# Patient Record
Sex: FEMALE | URBAN - METROPOLITAN AREA
[De-identification: names, ages, dates, MRNs, and addresses within clinical notes are randomized per-mention and may not be internally consistent; named-entity substitution may affect disease eponyms.]

---

## 2023-08-30 ENCOUNTER — HOSPITAL ENCOUNTER (EMERGENCY)
Facility: HOSPITAL | Age: 23
Discharge: HOME | End: 2023-08-31
Attending: EMERGENCY MEDICINE
Payer: COMMERCIAL

## 2023-08-30 DIAGNOSIS — F29 PSYCHOSIS (HCC): Primary | ICD-10-CM

## 2023-08-30 DIAGNOSIS — Z00.8 ENCOUNTER FOR PSYCHOLOGICAL EVALUATION: ICD-10-CM

## 2023-08-30 DIAGNOSIS — F10.929 ALCOHOL INTOXICATION (HCC): ICD-10-CM

## 2023-08-30 LAB
ALBUMIN SERPL BCP-MCNC: 4.6 G/DL (ref 3.5–5)
ALP SERPL-CCNC: 54 U/L (ref 34–104)
ALT SERPL W P-5'-P-CCNC: 9 U/L (ref 7–52)
AMPHETAMINES SERPL QL SCN: NEGATIVE
ANION GAP SERPL CALCULATED.3IONS-SCNC: 15 MMOL/L
APAP SERPL-MCNC: <10 UG/ML (ref 10–20)
AST SERPL W P-5'-P-CCNC: 17 U/L (ref 13–39)
B-HCG SERPL-ACNC: <1 MIU/ML (ref 0–5)
BARBITURATES UR QL: NEGATIVE
BASOPHILS # BLD AUTO: 0.02 THOUSANDS/ÂΜL (ref 0–0.1)
BASOPHILS NFR BLD AUTO: 0 % (ref 0–1)
BENZODIAZ UR QL: NEGATIVE
BILIRUB SERPL-MCNC: 0.3 MG/DL (ref 0.2–1)
BILIRUB UR QL STRIP: NEGATIVE
BUN SERPL-MCNC: 9 MG/DL (ref 5–25)
CALCIUM SERPL-MCNC: 9.6 MG/DL (ref 8.4–10.2)
CHLORIDE SERPL-SCNC: 103 MMOL/L (ref 96–108)
CLARITY UR: CLEAR
CO2 SERPL-SCNC: 19 MMOL/L (ref 21–32)
COCAINE UR QL: NEGATIVE
COLOR UR: NORMAL
CREAT SERPL-MCNC: 0.86 MG/DL (ref 0.6–1.3)
EOSINOPHIL # BLD AUTO: 0.07 THOUSAND/ÂΜL (ref 0–0.61)
EOSINOPHIL NFR BLD AUTO: 1 % (ref 0–6)
ERYTHROCYTE [DISTWIDTH] IN BLOOD BY AUTOMATED COUNT: 12.7 % (ref 11.6–15.1)
ETHANOL SERPL-MCNC: 47 MG/DL
EXT PREGNANCY TEST URINE: NEGATIVE
EXT. CONTROL: NORMAL
GFR SERPL CREATININE-BSD FRML MDRD: 96 ML/MIN/1.73SQ M
GLUCOSE SERPL-MCNC: 84 MG/DL (ref 65–140)
GLUCOSE UR STRIP-MCNC: NEGATIVE MG/DL
HCT VFR BLD AUTO: 36 % (ref 34.8–46.1)
HGB BLD-MCNC: 11.3 G/DL (ref 11.5–15.4)
HGB UR QL STRIP.AUTO: NEGATIVE
IMM GRANULOCYTES # BLD AUTO: 0.01 THOUSAND/UL (ref 0–0.2)
IMM GRANULOCYTES NFR BLD AUTO: 0 % (ref 0–2)
KETONES UR STRIP-MCNC: NEGATIVE MG/DL
LEUKOCYTE ESTERASE UR QL STRIP: NEGATIVE
LYMPHOCYTES # BLD AUTO: 1.94 THOUSANDS/ÂΜL (ref 0.6–4.47)
LYMPHOCYTES NFR BLD AUTO: 35 % (ref 14–44)
MAGNESIUM SERPL-MCNC: 2 MG/DL (ref 1.9–2.7)
MCH RBC QN AUTO: 29.3 PG (ref 26.8–34.3)
MCHC RBC AUTO-ENTMCNC: 31.4 G/DL (ref 31.4–37.4)
MCV RBC AUTO: 93 FL (ref 82–98)
METHADONE UR QL: NEGATIVE
MONOCYTES # BLD AUTO: 0.48 THOUSAND/ÂΜL (ref 0.17–1.22)
MONOCYTES NFR BLD AUTO: 9 % (ref 4–12)
NEUTROPHILS # BLD AUTO: 3.05 THOUSANDS/ÂΜL (ref 1.85–7.62)
NEUTS SEG NFR BLD AUTO: 55 % (ref 43–75)
NITRITE UR QL STRIP: NEGATIVE
NRBC BLD AUTO-RTO: 0 /100 WBCS
OPIATES UR QL SCN: NEGATIVE
OXYCODONE+OXYMORPHONE UR QL SCN: NEGATIVE
PCP UR QL: NEGATIVE
PH UR STRIP.AUTO: 6.5 [PH]
PLATELET # BLD AUTO: 292 THOUSANDS/UL (ref 149–390)
PMV BLD AUTO: 9.7 FL (ref 8.9–12.7)
POTASSIUM SERPL-SCNC: 3.6 MMOL/L (ref 3.5–5.3)
PROT SERPL-MCNC: 7.2 G/DL (ref 6.4–8.4)
PROT UR STRIP-MCNC: NEGATIVE MG/DL
RBC # BLD AUTO: 3.86 MILLION/UL (ref 3.81–5.12)
SALICYLATES SERPL-MCNC: <5 MG/DL (ref 3–20)
SODIUM SERPL-SCNC: 137 MMOL/L (ref 135–147)
SP GR UR STRIP.AUTO: <=1.005 (ref 1–1.03)
THC UR QL: NEGATIVE
UROBILINOGEN UR QL STRIP.AUTO: 0.2 E.U./DL
WBC # BLD AUTO: 5.57 THOUSAND/UL (ref 4.31–10.16)

## 2023-08-30 PROCEDURE — 82077 ASSAY SPEC XCP UR&BREATH IA: CPT | Performed by: EMERGENCY MEDICINE

## 2023-08-30 PROCEDURE — 36415 COLL VENOUS BLD VENIPUNCTURE: CPT | Performed by: EMERGENCY MEDICINE

## 2023-08-30 PROCEDURE — 80307 DRUG TEST PRSMV CHEM ANLYZR: CPT | Performed by: EMERGENCY MEDICINE

## 2023-08-30 PROCEDURE — 85025 COMPLETE CBC W/AUTO DIFF WBC: CPT | Performed by: EMERGENCY MEDICINE

## 2023-08-30 PROCEDURE — 80179 DRUG ASSAY SALICYLATE: CPT | Performed by: EMERGENCY MEDICINE

## 2023-08-30 PROCEDURE — 99282 EMERGENCY DEPT VISIT SF MDM: CPT

## 2023-08-30 PROCEDURE — 80053 COMPREHEN METABOLIC PANEL: CPT | Performed by: EMERGENCY MEDICINE

## 2023-08-30 PROCEDURE — 81025 URINE PREGNANCY TEST: CPT | Performed by: EMERGENCY MEDICINE

## 2023-08-30 PROCEDURE — 81003 URINALYSIS AUTO W/O SCOPE: CPT | Performed by: EMERGENCY MEDICINE

## 2023-08-30 PROCEDURE — 84702 CHORIONIC GONADOTROPIN TEST: CPT | Performed by: EMERGENCY MEDICINE

## 2023-08-30 PROCEDURE — 80143 DRUG ASSAY ACETAMINOPHEN: CPT | Performed by: EMERGENCY MEDICINE

## 2023-08-30 PROCEDURE — 96372 THER/PROPH/DIAG INJ SC/IM: CPT

## 2023-08-30 PROCEDURE — 83735 ASSAY OF MAGNESIUM: CPT | Performed by: EMERGENCY MEDICINE

## 2023-08-30 RX ORDER — LORAZEPAM 2 MG/ML
2 INJECTION INTRAMUSCULAR ONCE
Status: DISCONTINUED | OUTPATIENT
Start: 2023-08-30 | End: 2023-08-30

## 2023-08-30 RX ORDER — LORAZEPAM 2 MG/ML
2 INJECTION INTRAMUSCULAR ONCE
Status: COMPLETED | OUTPATIENT
Start: 2023-08-30 | End: 2023-08-30

## 2023-08-30 RX ORDER — OLANZAPINE 10 MG/1
10 INJECTION, POWDER, LYOPHILIZED, FOR SOLUTION INTRAMUSCULAR ONCE
Status: COMPLETED | OUTPATIENT
Start: 2023-08-30 | End: 2023-08-30

## 2023-08-30 RX ADMIN — LORAZEPAM 2 MG: 2 INJECTION INTRAMUSCULAR; INTRAVENOUS at 21:44

## 2023-08-30 RX ADMIN — OLANZAPINE 10 MG: 10 INJECTION, POWDER, FOR SOLUTION INTRAMUSCULAR at 21:44

## 2023-08-31 VITALS
RESPIRATION RATE: 18 BRPM | DIASTOLIC BLOOD PRESSURE: 55 MMHG | OXYGEN SATURATION: 97 % | WEIGHT: 143.96 LBS | HEART RATE: 72 BPM | TEMPERATURE: 99.1 F | SYSTOLIC BLOOD PRESSURE: 90 MMHG

## 2023-08-31 RX ORDER — AMIODARONE HYDROCHLORIDE 50 MG/ML
INJECTION, SOLUTION INTRAVENOUS
Status: DISCONTINUED
Start: 2023-08-31 | End: 2023-08-31 | Stop reason: WASHOUT

## 2023-08-31 NOTE — ED NOTES
15:35 - PES got verbal consent from patient to speak with BF Johnny April [phone]). 16:55 - d/c to BF in lobby - given referral phone #'s for [facility] intake and [facility] and [employer].

## 2023-08-31 NOTE — DISCHARGE INSTRUCTIONS
If you have any thoughts of hurting yourself or hurting anyone else or do not feel safe at home, return to the emergency department.

## 2023-08-31 NOTE — ED CARE HANDOFF
Emergency Department Sign Out Note        Sign out and transfer of care from Dr. Blake Montano. See Separate Emergency Department note. The patient, Sue Sanz, was evaluated by the previous provider for agitation, intoxication. Workup Completed:  Patient previously medically cleared    ED Course / Workup Pending (followup): Patient signed out pending observation period and reassessment by crisis worker. On both my evaluation and of duration of ED crisis worker patient awake, alert, calm, cooperative. Patient stating that she only made recalls the events from last night however acknowledges that she has had a similar reaction to alcohol in the past.  Patient denies any additional medical or psychiatric complaint at this time, feels safe being discharged home. Procedures  MDM        Disposition  Final diagnoses:   Psychosis ([address])   Encounter for psychological evaluation     Time reflects when diagnosis was documented in both MDM as applicable and the Disposition within this note     Time User Action Codes Description Comment    8/31/2023 12:38 AM Wilfred Flores [F29] Psychosis ([address])     8/31/2023 12:38 AM Wilfred Flores [Z00.8] Encounter for psychological evaluation       ED Disposition     ED Disposition   Transfer to [address] [place] [address]   --    Date/Time   Thu Aug 31, 2023 12:38 AM    Comment   Sue Sanz should be transferred out to [facility] and has been medically cleared. Follow-up Information    None       Patient's Medications    No medications on file     No discharge procedures on file.        ED Provider  Electronically Signed by     Naif Junior MD  08/31/23 5677

## 2023-08-31 NOTE — RESTRAINT FACE TO FACE
Restraint Face to Face   Patricia Brown 26 y.o. female MRN: [id number]  Unit/Bed#: ED CT1 Encounter: [id number]      Physical Evaluation - agitated  Purpose for Restraints/ Seclusion High risk for self harm, High risk for causing significant disruption of treatment environment , High risk for harm to others and high risk for flight  Patient's reaction to the intervention - remains agitated  Patient's medical condition - stable  Patient's Behavioral condition - agitated  Restraints to be Continued

## 2023-08-31 NOTE — ED PROVIDER NOTES
History  Chief Complaint   Patient presents with   • Psychiatric Evaluation     Pt brought in by EMS in 3 point restraints due to pt not cooperating. Hx of bipolar. Pt screaming, spitting, and kicking EMS. Patient is a 27-year-old female that presents to the emergency department for psychiatric evaluation. Patient with auditory and visual hallucinations, having a conversation with another party and screaming. Patient has allegedly been drinking alcohol today. Patient is difficult to redirect, uncooperative and attempting to assault staff. Patient placed in four-point restraints on arrival.      History provided by:  Patient and EMS personnel  History limited by:  Psychiatric disorder   used: No    Psychiatric Evaluation  Presenting symptoms: agitation    Associated symptoms: anxiety        None       Past Medical History:   Diagnosis Date   • Asthma    • Bipolar and related disorder ([address])    • Schizo affective schizophrenia ([address])     provided by friends/family       History reviewed. No pertinent surgical history. History reviewed. No pertinent family history. I have reviewed and agree with the history as documented. E-Cigarette/Vaping     E-Cigarette/Vaping Substances     Social History     Tobacco Use   • Smoking status: Unknown       Review of Systems   Psychiatric/Behavioral: Positive for agitation, behavioral problems and confusion. The patient is nervous/anxious. Physical Exam  Physical Exam  Vitals and nursing note reviewed. Constitutional:       General: She is not in acute distress. Appearance: She is well-developed. She is not diaphoretic. HENT:      Head: Normocephalic and atraumatic. Eyes:      Conjunctiva/sclera: Conjunctivae normal.      Pupils: Pupils are equal, round, and reactive to light. Cardiovascular:      Rate and Rhythm: Normal rate and regular rhythm. Heart sounds: Normal heart sounds. No murmur heard.   Pulmonary:      Effort: Pulmonary effort is normal. No respiratory distress. Breath sounds: Normal breath sounds. Abdominal:      General: Bowel sounds are normal. There is no distension. Palpations: Abdomen is soft. Tenderness: There is no abdominal tenderness. Musculoskeletal:         General: No deformity. Normal range of motion. Cervical back: Normal range of motion and neck supple. Skin:     General: Skin is warm and dry. Capillary Refill: Capillary refill takes less than 2 seconds. Coloration: Skin is not pale. Findings: No rash. Neurological:      Mental Status: She is alert. Cranial Nerves: No cranial nerve deficit. Psychiatric:         Behavior: Behavior is uncooperative and aggressive. Judgment: Judgment is inappropriate.          Vital Signs  ED Triage Vitals   Temperature Pulse Respirations Blood Pressure SpO2   08/30/23 2133 08/30/23 2133 08/30/23 2133 08/30/23 2133 08/30/23 2147   98.4 °F (36.9 °C) 95 20 142/74 97 %      Temp Source Heart Rate Source Patient Position - Orthostatic VS BP Location FiO2 (%)   08/30/23 2133 08/30/23 2133 -- -- --   Tympanic Monitor         Pain Score       --                  Vitals:    08/30/23 2133   BP: 142/74   Pulse: 95         Visual Acuity      ED Medications  Medications   OLANZapine (ZyPREXA) IM injection 10 mg (10 mg Intramuscular Given 8/30/23 2144)   LORazepam (ATIVAN) injection 2 mg (2 mg Intramuscular Given 8/30/23 2144)       Diagnostic Studies  Results Reviewed     Procedure Component Value Units Date/Time    Salicylate level [[id number]]  (Normal) Collected: 08/30/23 2200    Lab Status: Final result Specimen: Blood from Arm, Right Updated: 13/58/47 1835     Salicylate Lvl <5 mg/dL     Comprehensive metabolic panel [[id number]]  (Abnormal) Collected: 08/30/23 2200    Lab Status: Final result Specimen: Blood from Arm, Right Updated: 08/30/23 2307     Sodium 137 mmol/L      Potassium 3.6 mmol/L      Chloride 103 mmol/L      CO2 19 mmol/L      ANION GAP 15 mmol/L      BUN 9 mg/dL      Creatinine 0.86 mg/dL      Glucose 84 mg/dL      Calcium 9.6 mg/dL      AST 17 U/L      ALT 9 U/L      Alkaline Phosphatase 54 U/L      Total Protein 7.2 g/dL      Albumin 4.6 g/dL      Total Bilirubin 0.30 mg/dL      eGFR 96 ml/min/1.73sq m     Narrative:      [facility] guidelines for Chronic Kidney Disease (CKD):   •  Stage 1 with normal or high GFR (GFR > 90 mL/min/1.73 square meters)  •  Stage 2 Mild CKD (GFR = 60-89 mL/min/1.73 square meters)  •  Stage 3A Moderate CKD (GFR = 45-59 mL/min/1.73 square meters)  •  Stage 3B Moderate CKD (GFR = 30-44 mL/min/1.73 square meters)  •  Stage 4 Severe CKD (GFR = 15-29 mL/min/1.73 square meters)  •  Stage 5 End Stage CKD (GFR <15 mL/min/1.73 square meters)  Note: GFR calculation is accurate only with a steady state creatinine    Magnesium [[id number]]  (Normal) Collected: 08/30/23 2200    Lab Status: Final result Specimen: Blood from Arm, Right Updated: 08/30/23 2307     Magnesium 2.0 mg/dL     Acetaminophen level-If concentration is detectable, please discuss with medical  on call. [[id number]]  (Abnormal) Collected: 08/30/23 2200    Lab Status: Final result Specimen: Blood from Arm, Right Updated: 08/30/23 2307     Acetaminophen Level <10 ug/mL     Quantitative hCG [[id number]]  (Normal) Collected: 08/30/23 2200    Lab Status: Final result Specimen: Blood from Arm, Right Updated: 08/30/23 2232     HCG, Quant <1 mIU/mL     Narrative:       Expected Ranges:    HCG results between 5 and 25 mIU/mL may be indicative of early pregnancy but should be interpreted in light of the total clinical presentation. HCG can rise to detectable levels in bernard and post menopausal women (0-11.6 mIU/mL).      Approximate               Approximate HCG  Gestation age          Concentration ( mIU/mL)  _____________          ______________________   Malcolm Rowe                      HCG values  0.2-1 5-50  1-2                           2-3                         100-5000  3-4                         500-05196  4-5                         1000-09484  5-6                         [id number]  6-8                         21753-203792  8-12                        [id number]      Rapid drug screen, urine [[id number]]  (Normal) Collected: 08/30/23 2200    Lab Status: Final result Specimen: Urine, Clean Catch Updated: 08/30/23 2229     Amph/Meth UR Negative     Barbiturate Ur Negative     Benzodiazepine Urine Negative     Cocaine Urine Negative     Methadone Urine Negative     Opiate Urine Negative     PCP Ur Negative     THC Urine Negative     Oxycodone Urine Negative    Narrative:      FOR MEDICAL PURPOSES ONLY. IF CONFIRMATION NEEDED PLEASE CONTACT THE LAB WITHIN 5 DAYS.     Drug Screen Cutoff Levels:  AMPHETAMINE/METHAMPHETAMINES  1000 ng/mL  BARBITURATES     200 ng/mL  BENZODIAZEPINES     200 ng/mL  COCAINE      300 ng/mL  METHADONE      300 ng/mL  OPIATES      300 ng/mL  PHENCYCLIDINE     25 ng/mL  THC       50 ng/mL  OXYCODONE      100 ng/mL    POCT pregnancy, urine [[id number]]  (Normal) Resulted: 08/30/23 2228    Lab Status: Final result Updated: 08/30/23 2228     EXT Preg Test, Ur Negative     Control Valid    Ethanol [[id number]]  (Abnormal) Collected: 08/30/23 2200    Lab Status: Final result Specimen: Blood from Arm, Right Updated: 08/30/23 2227     Ethanol Lvl 47 mg/dL     UA (URINE) with reflex to Scope [[id number]] Collected: 08/30/23 2200    Lab Status: Final result Specimen: Urine, Clean Catch Updated: 08/30/23 2214     Color, UA Light Yellow     Clarity, UA Clear     Specific Gravity, UA <=1.005     pH, UA 6.5     Leukocytes, UA Negative     Nitrite, UA Negative     Protein, UA Negative mg/dl      Glucose, UA Negative mg/dl      Ketones, UA Negative mg/dl      Urobilinogen, UA 0.2 E.U./dl      Bilirubin, UA Negative     Occult Blood, UA Negative    CBC and differential [[id number]] (Abnormal) Collected: 08/30/23 2200    Lab Status: Final result Specimen: Blood from Arm, Right Updated: 08/30/23 2209     WBC 5.57 Thousand/uL      RBC 3.86 Million/uL      Hemoglobin 11.3 g/dL      Hematocrit 36.0 %      MCV 93 fL      MCH 29.3 pg      MCHC 31.4 g/dL      RDW 12.7 %      MPV 9.7 fL      Platelets 702 Thousands/uL      nRBC 0 /100 WBCs      Neutrophils Relative 55 %      Immat GRANS % 0 %      Lymphocytes Relative 35 %      Monocytes Relative 9 %      Eosinophils Relative 1 %      Basophils Relative 0 %      Neutrophils Absolute 3.05 Thousands/µL      Immature Grans Absolute 0.01 Thousand/uL      Lymphocytes Absolute 1.94 Thousands/µL      Monocytes Absolute 0.48 Thousand/µL      Eosinophils Absolute 0.07 Thousand/µL      Basophils Absolute 0.02 Thousands/µL                  No orders to display              Procedures  Procedures         ED Course                                             Medical Decision Making  31-year-old female in the ED, agitated, with visual and auditory hallucinations. Patient physically and chemically restrained to facilitate treatment. Patient currently resting comfortable. She is medically cleared for psychiatric evaluation when she awakens. Patient will be signed out to oncoming physician. Amount and/or Complexity of Data Reviewed  Labs: ordered. Risk  Prescription drug management. Decision regarding hospitalization.           Disposition  Final diagnoses:   Psychosis ([address])   Encounter for psychological evaluation     Time reflects when diagnosis was documented in both MDM as applicable and the Disposition within this note     Time User Action Codes Description Comment    8/31/2023 12:38 AM Ralene Repress Add [F29] Psychosis ([address])     8/31/2023 12:38 AM Ralene Repress Add [Z00.8] Encounter for psychological evaluation       ED Disposition     ED Disposition   Transfer to [address] [place] [address]   --    Date/Time   Thu Aug 31, 2023 12:38 AM    Comment   Jeff Adkins should be transferred out to [facility] and has been medically cleared. Follow-up Information    None         Patient's Medications    No medications on file       No discharge procedures on file.     PDMP Review     None          ED Provider  Electronically Signed by           Jose R Guaman DO  08/31/23 0155

## 2023-08-31 NOTE — ED NOTES
8/31/23 @ 0930:  Patient was moved to secured holding due to ED needing previous room for medical patient. Patient refused breakfast and fell back to sleep. Patient says she's " very confused."  PES will allow patient to sleep, as reports were that patient hasn't slept in 3 days, and assess when patient is alert. [place], [address]: PES made another attempt to complete assessment, but patient was sleeping soundly. PES will continue to monitor.   Eusebio Cunningham, MS

## 2023-08-31 NOTE — ED NOTES
Unable to complete BH Assessment due to pt sleeping. Pt respirations regular. No distress noted.       Pablo Onofre RN  08/31/23 6358

## 2023-08-31 NOTE — ED NOTES
25 yo SBF presented to ER last night due to a behavioral change which resulted from Etoh intake (BAL was 47 @ 22:00). Patient is not known to PES. Stressors: "my family" - was kicked out of parent's home 6/23. Mood "mellow - kind of" - now; patient reports that Etoh changes her moods to "aggitated and aggressive". Symptoms include: the patient reports no recall of what happened last night which resulted in her ER presentation; sleeping poorly, 4-6 hours due to "not having trazadone"; eating 1 meal per day - weight is stable; concentration varies; energy level is "low and tired"; poor self esteem; A/V hallucinations are reported - "sometimes - AH: voices that talk to me and tell me to do this or that"; VH: "people who are not there"; +paranoia - "feels like someone is following me sometimes"; etoh use from age 26 - drank at least 2 beers last night - reports drinking 3 x's per week; has "experimented" with cannabis. The patient denies: any/all lethality; any problems with social supports; hopelessness; anhedonia; anxiety. 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

## 2023-11-21 ENCOUNTER — HOSPITAL ENCOUNTER (EMERGENCY)
Facility: HOSPITAL | Age: 23
Discharge: HOME | End: 2023-11-21
Attending: EMERGENCY MEDICINE
Payer: COMMERCIAL

## 2023-11-21 ENCOUNTER — APPOINTMENT (EMERGENCY)
Dept: RADIOLOGY | Facility: HOSPITAL | Age: 23
End: 2023-11-21
Payer: COMMERCIAL

## 2023-11-21 VITALS
OXYGEN SATURATION: 98 % | RESPIRATION RATE: 18 BRPM | HEIGHT: 64 IN | DIASTOLIC BLOOD PRESSURE: 73 MMHG | WEIGHT: 159.8 LBS | BODY MASS INDEX: 27.28 KG/M2 | TEMPERATURE: 99.3 F | HEART RATE: 75 BPM | SYSTOLIC BLOOD PRESSURE: 124 MMHG

## 2023-11-21 DIAGNOSIS — S61.411A LACERATION OF RIGHT HAND WITHOUT FOREIGN BODY, INITIAL ENCOUNTER: Primary | ICD-10-CM

## 2023-11-21 PROCEDURE — 73130 X-RAY EXAM OF HAND: CPT

## 2023-11-21 PROCEDURE — 99284 EMERGENCY DEPT VISIT MOD MDM: CPT | Performed by: PHYSICIAN ASSISTANT

## 2023-11-21 PROCEDURE — 99283 EMERGENCY DEPT VISIT LOW MDM: CPT

## 2023-11-21 PROCEDURE — 90715 TDAP VACCINE 7 YRS/> IM: CPT | Performed by: PHYSICIAN ASSISTANT

## 2023-11-21 PROCEDURE — 90471 IMMUNIZATION ADMIN: CPT

## 2023-11-21 PROCEDURE — 12001 RPR S/N/AX/GEN/TRNK 2.5CM/<: CPT | Performed by: PHYSICIAN ASSISTANT

## 2023-11-21 RX ORDER — TRAZODONE HYDROCHLORIDE 50 MG/1
50 TABLET ORAL
COMMUNITY

## 2023-11-21 RX ORDER — ALBUTEROL SULFATE 1.25 MG/3ML
1 SOLUTION RESPIRATORY (INHALATION) EVERY 6 HOURS PRN
COMMUNITY

## 2023-11-21 RX ORDER — ARIPIPRAZOLE 15 MG/1
15 TABLET ORAL DAILY
COMMUNITY

## 2023-11-21 RX ORDER — LIDOCAINE HYDROCHLORIDE AND EPINEPHRINE 10; 10 MG/ML; UG/ML
5 INJECTION, SOLUTION INFILTRATION; PERINEURAL ONCE
Status: COMPLETED | OUTPATIENT
Start: 2023-11-21 | End: 2023-11-21

## 2023-11-21 RX ADMIN — LIDOCAINE HYDROCHLORIDE,EPINEPHRINE BITARTRATE 5 ML: 10; .01 INJECTION, SOLUTION INFILTRATION; PERINEURAL at 13:57

## 2023-11-21 RX ADMIN — TETANUS TOXOID, REDUCED DIPHTHERIA TOXOID AND ACELLULAR PERTUSSIS VACCINE, ADSORBED 0.5 ML: 5; 2.5; 8; 8; 2.5 SUSPENSION INTRAMUSCULAR at 13:57

## 2023-11-21 NOTE — ED PROVIDER NOTES
History  Chief Complaint   Patient presents with    Hand Laceration     States she fell down 2-3 steps and fell onto a bead from bracelet which she pulled out. States No LOC, denies other injuries. Fall     Patient is a right-hand-dominant 68-year-old female with past medical history significant for bipolar disorder and asthma who presents for evaluation of right palm laceration. Patient states that she fell on outstretched hands down 2 steps and a bead from her bracelet embedded into her hand. She was able to remove the bead but has had bleeding and pain since the incident. History provided by:  Patient  Laceration  Location:  Shoulder/arm  Shoulder/arm laceration location:  R hand  Length:  0.5  Depth:  Cutaneous  Quality: avulsion    Bleeding: venous    Time since incident:  2 hours  Laceration mechanism:  Blunt object  Pain details:     Quality:  Aching and dull    Severity:  Moderate    Timing:  Constant    Progression:  Unchanged  Foreign body present:  No foreign bodies  Relieved by:  None tried  Worsened by: Movement  Ineffective treatments:  None tried  Tetanus status:  Unknown  Associated symptoms: no fever, no focal weakness, no numbness, no rash, no redness, no swelling and no streaking        Prior to Admission Medications   Prescriptions Last Dose Informant Patient Reported? Taking? ARIPiprazole (ABILIFY) 15 mg tablet   Yes No   Sig: Take 15 mg by mouth daily   albuterol (ACCUNEB) 1.25 MG/3ML nebulizer solution   Yes No   Sig: Inhale 1 ampule every 6 (six) hours as needed   traZODone (DESYREL) 50 mg tablet   Yes Yes   Sig: Take 50 mg by mouth daily at bedtime      Facility-Administered Medications: None       Past Medical History:   Diagnosis Date    Asthma     Bipolar and related disorder ([facility])     Schizo affective schizophrenia ([address] [facility])     provided by friends/family       History reviewed. No pertinent surgical history. History reviewed. No pertinent family history.   I have reviewed and agree with the history as documented. E-Cigarette/Vaping    E-Cigarette Use Current Every Day User      E-Cigarette/Vaping Substances    Nicotine Yes     THC Yes     CBD Yes     Flavoring Yes     Other No     Unknown No      Social History     Tobacco Use    Smoking status: Every Day     Packs/day: 0.25     Types: Cigarettes    Smokeless tobacco: Never   Vaping Use    Vaping Use: Every day    Substances: Nicotine, THC, CBD, Flavoring   Substance Use Topics    Alcohol use: Not Currently    Drug use: Yes     Types: Marijuana       Review of Systems   Constitutional: Negative. Negative for chills and fever. HENT: Negative. Negative for ear pain and sore throat. Eyes: Negative. Negative for pain and visual disturbance. Respiratory: Negative. Negative for cough and shortness of breath. Cardiovascular: Negative. Negative for chest pain and palpitations. Gastrointestinal: Negative. Negative for abdominal pain and vomiting. Endocrine: Negative. Genitourinary: Negative. Negative for dysuria and hematuria. Musculoskeletal: Negative. Negative for arthralgias and back pain. Skin:  Positive for wound. Negative for color change and rash. Allergic/Immunologic: Negative. Neurological: Negative. Negative for focal weakness, seizures and syncope. Hematological: Negative. Psychiatric/Behavioral: Negative. All other systems reviewed and are negative. Physical Exam  Physical Exam  Vitals and nursing note reviewed. Constitutional:       Appearance: Normal appearance. HENT:      Head: Normocephalic and atraumatic. Nose: Nose normal.      Mouth/Throat:      Mouth: Mucous membranes are moist.   Eyes:      General: No scleral icterus. Conjunctiva/sclera: Conjunctivae normal.   Cardiovascular:      Rate and Rhythm: Normal rate and regular rhythm. Pulses: Normal pulses. Pulmonary:      Effort: Pulmonary effort is normal. No respiratory distress.       Breath sounds: Normal breath sounds. Musculoskeletal:         General: Tenderness and signs of injury present. Normal range of motion. Hands:       Cervical back: Normal range of motion and neck supple. Neurological:      Mental Status: She is alert. Vital Signs  ED Triage Vitals [11/21/23 1319]   Temperature Pulse Respirations Blood Pressure SpO2   99.3 °F (37.4 °C) 75 18 124/73 98 %      Temp Source Heart Rate Source Patient Position - Orthostatic VS BP Location FiO2 (%)   Tympanic Monitor Sitting Left arm --      Pain Score       5           Vitals:    11/21/23 1319   BP: 124/73   Pulse: 75   Patient Position - Orthostatic VS: Sitting         Visual Acuity      ED Medications  Medications   tetanus-diphtheria-acellular pertussis (BOOSTRIX) IM injection 0.5 mL (0.5 mL Intramuscular Given 11/21/23 1357)   lidocaine-epinephrine (XYLOCAINE/EPINEPHRINE) 1 %-1:100,000 injection 5 mL (5 mL Infiltration Given 11/21/23 1357)       Diagnostic Studies  Results Reviewed       None                   XR hand 3+ views RIGHT   ED Interpretation by Kourtney Aguilar PA-C (11/21 2556)   No obvious acute osseous abnormality                 Procedures  Universal Protocol:  Consent: Verbal consent obtained. Risks and benefits: risks, benefits and alternatives were discussed  Consent given by: patient  Time out: Immediately prior to procedure a "time out" was called to verify the correct patient, procedure, equipment, support staff and site/side marked as required.   Patient identity confirmed: verbally with patient  Laceration repair    Date/Time: 11/21/2023 5:36 PM    Performed by: Kourtney Aguilar PA-C  Authorized by: Kourtney Aguilar PA-C  Body area: upper extremity  Location details: right hand  Laceration length: 0.5 cm  Foreign bodies: no foreign bodies  Tendon involvement: none  Nerve involvement: none  Vascular damage: no  Anesthesia: local infiltration    Anesthesia:  Local Anesthetic: lidocaine 1% with epinephrine    Wound Dehiscence:  Superficial Wound Dehiscence: simple closure      Procedure Details:  Irrigation solution: saline  Irrigation method: syringe  Skin closure: glue  Patient tolerance: patient tolerated the procedure well with no immediate complications               ED Course                               SBIRT 20yo+      Flowsheet Row Most Recent Value   Initial Alcohol Screen: US AUDIT-C     1. How often do you have a drink containing alcohol? 0 Filed at: 11/21/2023 1323   Audit-C Score 0 Filed at: 11/21/2023 1323   CRYSTAL: How many times in the past year have you. .. Used an illegal drug or used a prescription medication for non-medical reasons? Never Filed at: 11/21/2023 1323                      Medical Decision Making  Problems Addressed:  Laceration of right hand without foreign body, initial encounter: acute illness or injury     Details: Superficial laceration of right hand without foreign body  Repaired with glue  Covered with adhesive bandage  Patient educated on red flag symptoms of necessitate return to the ED    Amount and/or Complexity of Data Reviewed  Radiology: ordered and independent interpretation performed. Risk  Prescription drug management. Disposition  Final diagnoses:   Laceration of right hand without foreign body, initial encounter     Time reflects when diagnosis was documented in both MDM as applicable and the Disposition within this note       Time User Action Codes Description Comment    11/21/2023  2:28 PM [address], [address] Laceration of right hand without foreign body, initial encounter           ED Disposition       ED Disposition   Discharge    Condition   Stable    Date/Time   Tue Nov 21, 2023 [address] discharge to home/self care.                    Follow-up Information       Follow up With Specialties Details Why Contact Info Additional Information    [employer]  Call  to establish PCP in area [phone] [address] Emergency Department Emergency Medicine Go to  If symptoms worsen [address] [address]  [address] [facility] Emergency Department, [address], [place], [address]            Discharge Medication List as of 11/21/2023  2:29 PM        CONTINUE these medications which have NOT CHANGED    Details   traZODone (DESYREL) 50 mg tablet Take 50 mg by mouth daily at bedtime, Historical Med      albuterol (ACCUNEB) 1.25 MG/3ML nebulizer solution Inhale 1 ampule every 6 (six) hours as needed, Historical Med      ARIPiprazole (ABILIFY) 15 mg tablet Take 15 mg by mouth daily, Historical Med             No discharge procedures on file.     PDMP Review       None            ED Provider  Electronically Signed by             Irena Hall PA-C  11/21/23 8979

## 2024-05-22 ENCOUNTER — APPOINTMENT (EMERGENCY)
Dept: RADIOLOGY | Facility: HOSPITAL | Age: 24
End: 2024-05-22
Payer: COMMERCIAL

## 2024-05-22 ENCOUNTER — HOSPITAL ENCOUNTER (EMERGENCY)
Facility: HOSPITAL | Age: 24
Discharge: HOME | End: 2024-05-22
Attending: EMERGENCY MEDICINE
Payer: COMMERCIAL

## 2024-05-22 VITALS
TEMPERATURE: 98.3 F | DIASTOLIC BLOOD PRESSURE: 72 MMHG | SYSTOLIC BLOOD PRESSURE: 130 MMHG | OXYGEN SATURATION: 97 % | RESPIRATION RATE: 20 BRPM | HEART RATE: 102 BPM

## 2024-05-22 DIAGNOSIS — M53.3 TAIL BONE PAIN: Primary | ICD-10-CM

## 2024-05-22 LAB
EXT PREGNANCY TEST URINE: NEGATIVE
EXT. CONTROL: NORMAL

## 2024-05-22 PROCEDURE — 72220 X-RAY EXAM SACRUM TAILBONE: CPT

## 2024-05-22 PROCEDURE — 81025 URINE PREGNANCY TEST: CPT | Performed by: EMERGENCY MEDICINE

## 2024-05-22 PROCEDURE — 99283 EMERGENCY DEPT VISIT LOW MDM: CPT

## 2024-05-22 PROCEDURE — 99284 EMERGENCY DEPT VISIT MOD MDM: CPT | Performed by: EMERGENCY MEDICINE

## 2024-05-22 RX ORDER — METHOCARBAMOL 500 MG/1
500 TABLET, FILM COATED ORAL 2 TIMES DAILY PRN
Qty: 20 TABLET | Refills: 0 | Status: SHIPPED | OUTPATIENT
Start: 2024-05-22

## 2024-05-22 RX ORDER — METHOCARBAMOL 500 MG/1
500 TABLET, FILM COATED ORAL ONCE
Status: COMPLETED | OUTPATIENT
Start: 2024-05-22 | End: 2024-05-22

## 2024-05-22 RX ADMIN — DICLOFENAC SODIUM 2 G: 10 GEL TOPICAL at 21:29

## 2024-05-22 RX ADMIN — METHOCARBAMOL 500 MG: 500 TABLET ORAL at 20:04

## 2024-05-22 NOTE — ED PROVIDER NOTES
History  Chief Complaint   Patient presents with    Back Pain     Pain in tailbone. States stems back from an accident as a child, never had an xray, today sat down at work and felt pain immediately, advil not helping     Pt is a 24yo F who presents for tailbone pain.  Patient reports that when she was a child she had a tailbone injury and has intermittently had pain secondary to that.  Patient states today the pain was worse than it has been.  Patient states she has been taking Tylenol and ibuprofen but her pain is worsened after sitting at work today.  Patient states she did not have any other options and therefore came in for further evaluation.  Patient states she has never been seen for this before.  Patient states that she has not had any recent trauma or injury.  Patient states she has no difficulty going to the bathroom aside from the fact that it is painful to sit on the toilet.  Patient states she is otherwise healthy.        Prior to Admission Medications   Prescriptions Last Dose Informant Patient Reported? Taking?   ARIPiprazole (ABILIFY) 15 mg tablet   Yes No   Sig: Take 15 mg by mouth daily   albuterol (ACCUNEB) 1.25 MG/3ML nebulizer solution   Yes No   Sig: Inhale 1 ampule every 6 (six) hours as needed   traZODone (DESYREL) 50 mg tablet   Yes No   Sig: Take 50 mg by mouth daily at bedtime      Facility-Administered Medications: None       Past Medical History:   Diagnosis Date    Asthma     Bipolar and related disorder (HCC)     Schizo affective schizophrenia (HCC)     provided by friends/family       History reviewed. No pertinent surgical history.    History reviewed. No pertinent family history.  I have reviewed and agree with the history as documented.    E-Cigarette/Vaping    E-Cigarette Use Former User      E-Cigarette/Vaping Substances    Nicotine Yes     THC Yes     CBD Yes     Flavoring Yes     Other No     Unknown No      Social History     Tobacco Use    Smoking status: Former     Current  packs/day: 0.25     Types: Cigarettes    Smokeless tobacco: Never   Vaping Use    Vaping status: Former    Substances: Nicotine, THC, CBD, Flavoring   Substance Use Topics    Alcohol use: Not Currently    Drug use: Yes     Types: Marijuana       Physical Exam  Physical Exam  Vitals reviewed.   Constitutional:       Appearance: She is well-developed. She is not toxic-appearing or diaphoretic.   HENT:      Head: Normocephalic and atraumatic.      Right Ear: External ear normal.      Left Ear: External ear normal.      Nose: Nose normal.      Mouth/Throat:      Pharynx: Oropharynx is clear.   Eyes:      Extraocular Movements: Extraocular movements intact.      Pupils: Pupils are equal, round, and reactive to light.   Cardiovascular:      Rate and Rhythm: Normal rate and regular rhythm.      Heart sounds: Normal heart sounds.   Pulmonary:      Effort: Pulmonary effort is normal. No respiratory distress.      Breath sounds: Normal breath sounds.   Abdominal:      General: There is no distension.      Palpations: Abdomen is soft.      Tenderness: There is no abdominal tenderness.   Musculoskeletal:      Cervical back: Normal range of motion and neck supple.        Back:       Right lower leg: No edema.      Left lower leg: No edema.      Comments: Sacral tenderness without deformity or skin changes   Skin:     General: Skin is warm and dry.      Capillary Refill: Capillary refill takes less than 2 seconds.   Neurological:      General: No focal deficit present.      Mental Status: She is alert and oriented to person, place, and time.   Psychiatric:         Speech: Speech normal.         Behavior: Behavior is cooperative.         Vital Signs  ED Triage Vitals [05/22/24 1924]   Temperature Pulse Respirations Blood Pressure SpO2   98.3 °F (36.8 °C) 102 20 130/72 97 %      Temp Source Heart Rate Source Patient Position - Orthostatic VS BP Location FiO2 (%)   Oral Monitor Standing Right arm --      Pain Score       9            Vitals:    05/22/24 1924   BP: 130/72   Pulse: 102   Patient Position - Orthostatic VS: Standing         Visual Acuity      ED Medications  Medications   methocarbamol (ROBAXIN) tablet 500 mg (500 mg Oral Given 5/22/24 2004)   Diclofenac Sodium (VOLTAREN) 1 % topical gel 2 g (2 g Topical Given 5/22/24 2129)       Diagnostic Studies  Results Reviewed       Procedure Component Value Units Date/Time    POCT pregnancy, urine [[id number]]  (Normal) Resulted: 05/22/24 2007    Lab Status: Final result Updated: 05/22/24 2007     EXT Preg Test, Ur Negative     Control Valid                   XR sacrum and coccyx    (Results Pending)              Procedures  Procedures         ED Course  ED Course as of 05/22/24 2314   Wed May 22, 2024   2008 PREGNANCY TEST URINE: Negative  Negative.    2103 XR sacrum and coccyx  No acute findings on wet read.                                              Medical Decision Making  Pt is a 22yo F who presents with tailbone pain.     Will obtain x-ray to rule out osseous abnormality.  Will treat symptomatically.  See ED course for results and details.    Plan to discharge pt with f/u to PCP and [facility]. Discussed returning the ED with new or worsening of symptoms. Discussed use of over the counter medications as stated on the bottle as needed for pain. Pt expressed understanding of discharge instructions, return precautions, and medication instructions and is stable for discharge at this time. All questions were answered and pt was discharged without incident.       Amount and/or Complexity of Data Reviewed  Labs: ordered. Decision-making details documented in ED Course.  Radiology: ordered. Decision-making details documented in ED Course.    Risk  Prescription drug management.             Disposition  Final diagnoses:   Tail bone pain     Time reflects when diagnosis was documented in both MDM as applicable and the Disposition within this note       Time User Action Codes  Description Comment    5/22/2024  9:08 PM Tania Potter Add [M53.3] Tail bone pain           ED Disposition       ED Disposition   Discharge    Condition   Stable    Date/Time   Wed May 22, 2024  9:08 PM    Comment   Mira Jameson discharge to home/self care.                   Follow-up Information       Follow up With Specialties Details Why Contact Info Additional Information    [facility] Physical Therapy Call  As needed [phone]            Discharge Medication List as of 5/22/2024  9:11 PM        START taking these medications    Details   Diclofenac Sodium (VOLTAREN) 1 % Apply 2 g topically 4 (four) times a day as needed (pain), Starting Wed 5/22/2024, Normal      methocarbamol (ROBAXIN) 500 mg tablet Take 1 tablet (500 mg total) by mouth 2 (two) times a day as needed for muscle spasms, Starting Wed 5/22/2024, Normal           CONTINUE these medications which have NOT CHANGED    Details   albuterol (ACCUNEB) 1.25 MG/3ML nebulizer solution Inhale 1 ampule every 6 (six) hours as needed, Historical Med      ARIPiprazole (ABILIFY) 15 mg tablet Take 15 mg by mouth daily, Historical Med      traZODone (DESYREL) 50 mg tablet Take 50 mg by mouth daily at bedtime, Historical Med             No discharge procedures on file.    PDMP Review       None            ED Provider  Electronically Signed by             Tania Potter MD  05/22/24 8334

## 2024-05-23 NOTE — DISCHARGE INSTRUCTIONS
Follow-up with [facility] for further care. Contact info provided below if needed.  Use over the counter medications as stated on the bottle as needed for pain control.  - Tylenol  - Motrin  - Lidocaine patch  Take your new medications as prescribed.  Purchase donut pillow to offload area.  Return to the ED with new or worsening symptoms.

## 2024-05-23 NOTE — ED NOTES
Pt seen, assessed and d/c by provider. Pt appeared to be in no acute distress upon discharge. Pt able to ambulate well without assistance upon exiting.      Nakia Porras RN  05/22/24 0755

## 2024-10-07 ENCOUNTER — APPOINTMENT (EMERGENCY)
Dept: RADIOLOGY | Facility: HOSPITAL | Age: 24
End: 2024-10-07
Payer: COMMERCIAL

## 2024-10-07 ENCOUNTER — HOSPITAL ENCOUNTER (EMERGENCY)
Facility: HOSPITAL | Age: 24
Discharge: HOME | End: 2024-10-07
Attending: EMERGENCY MEDICINE | Admitting: EMERGENCY MEDICINE
Payer: COMMERCIAL

## 2024-10-07 VITALS
SYSTOLIC BLOOD PRESSURE: 121 MMHG | WEIGHT: 197 LBS | TEMPERATURE: 99.9 F | HEART RATE: 82 BPM | BODY MASS INDEX: 33.81 KG/M2 | RESPIRATION RATE: 22 BRPM | DIASTOLIC BLOOD PRESSURE: 74 MMHG | OXYGEN SATURATION: 96 %

## 2024-10-07 DIAGNOSIS — R07.9 CHEST PAIN: Primary | ICD-10-CM

## 2024-10-07 DIAGNOSIS — R00.2 PALPITATIONS: ICD-10-CM

## 2024-10-07 PROCEDURE — 99284 EMERGENCY DEPT VISIT MOD MDM: CPT

## 2024-10-07 PROCEDURE — 71045 X-RAY EXAM CHEST 1 VIEW: CPT

## 2024-10-07 PROCEDURE — 99285 EMERGENCY DEPT VISIT HI MDM: CPT | Performed by: EMERGENCY MEDICINE

## 2024-10-07 PROCEDURE — 93005 ELECTROCARDIOGRAM TRACING: CPT

## 2024-10-07 NOTE — Clinical Note
accompanied Mira Jameson to the emergency department on 10/7/2024.    Return date if applicable: 10/08/2024        If you have any questions or concerns, please don't hesitate to call.      Jesu Morrow MD

## 2024-10-07 NOTE — ED PROVIDER NOTES
Final diagnoses:   Chest pain   Palpitations     ED Disposition       ED Disposition   Discharge    Condition   Stable    Date/Time   Mon Oct 7, 2024  5:21 PM    Comment   Mira Jameson discharge to home/self care.                   Assessment & Plan       Medical Decision Making  Patient well-appearing, minimally symptomatic at time of evaluation with normal vital signs.  EKG unremarkable.  Chest pain and tenderness somewhat reproducible on exam, may reflect musculoskeletal etiology.  Given her reported palpitations and elevated heart rate at home, instructed to follow-up not emergently with cardiology, may benefit from Holter monitoring.  Patient understanding this.  Patient low risk Wells, PE excluded clinically via PERC criteria.  Provided with reassurance, discharged with return precautions.    Amount and/or Complexity of Data Reviewed  Radiology: ordered and independent interpretation performed.             Medications - No data to display    ED Risk Strat Scores                       PERC Rule for PE      Flowsheet Row Most Recent Value   PERC Rule for PE    Age >=50 0 Filed at: 10/07/2024 1741   HR >=100 0 Filed at: 10/07/2024 1741   O2 Sat on room air < 95% 0 Filed at: 10/07/2024 1741   History of PE or DVT 0 Filed at: 10/07/2024 1741   Recent trauma or surgery 0 Filed at: 10/07/2024 1741   Hemoptysis 0 Filed at: 10/07/2024 1741   Exogenous estrogen 0 Filed at: 10/07/2024 1741   Unilateral leg swelling 0 Filed at: 10/07/2024 1741   PERC Rule for PE Results 0 Filed at: 10/07/2024 1741                Wells' Criteria for PE      Flowsheet Row Most Recent Value   Wells' Criteria for PE    Clinical signs and symptoms of DVT 0 Filed at: 10/07/2024 1741   PE is primary diagnosis or equally likely 0 Filed at: 10/07/2024 1741   HR >100 0 Filed at: 10/07/2024 1741   Immobilization at least 3 days or Surgery in the previous 4 weeks 0 Filed at: 10/07/2024 1741   Previous, objectively diagnosed PE or DVT 0 Filed at:  10/07/2024 1741   Hemoptysis 0 Filed at: 10/07/2024 1741   Malignancy with treatment within 6 months or palliative 0 Filed at: 10/07/2024 1741   Wells' Criteria Total 0 Filed at: 10/07/2024 1741                        History of Present Illness       Chief Complaint   Patient presents with    Chest Pain     Intermittent chest pain and L arm numbness for a couple of weeks. Watch kept telling her she was in afib       Past Medical History:   Diagnosis Date    Asthma     Bipolar and related disorder (HCC)     Schizo affective schizophrenia (HCC)     provided by friends/family      History reviewed. No pertinent surgical history.   History reviewed. No pertinent family history.   Social History     Tobacco Use    Smoking status: Former     Current packs/day: 0.25     Types: Cigarettes    Smokeless tobacco: Never   Vaping Use    Vaping status: Former    Substances: Nicotine, THC, CBD, Flavoring   Substance Use Topics    Alcohol use: Not Currently    Drug use: Yes     Types: Marijuana      E-Cigarette/Vaping    E-Cigarette Use Former User       E-Cigarette/Vaping Substances    Nicotine Yes     THC Yes     CBD Yes     Flavoring Yes     Other No     Unknown No       I have reviewed and agree with the history as documented.     Patient is a 24-year-old female presenting for evaluation of approximately 2 weeks of chest pain, intermittent palpitations, shortness of breath.  Patient states that her heart monitor watch has told her that she is in atrial fibrillation several times while she has been short of breath and that she has noted a heart rate of about 130 at that time.  Patient states that these symptoms typically quickly self resolve.  Patient denies any ongoing palpitations in the emergency department, does complain of some central chest pain worse with moving and taking a deep breath.  Patient denies calf swelling, recent immobilization or surgery, history of DVT/PE, oral contraceptive use.  Patient acknowledges that  she has been under a lot of stress recently.        Review of Systems   Constitutional:  Negative for chills, fatigue and fever.   Respiratory:  Positive for shortness of breath.    Cardiovascular:  Positive for chest pain and palpitations. Negative for leg swelling.   Gastrointestinal:  Negative for diarrhea, nausea and vomiting.   Musculoskeletal:  Negative for arthralgias and myalgias.   Neurological:  Negative for weakness, numbness and headaches.   Psychiatric/Behavioral:  Negative for confusion.    All other systems reviewed and are negative.          Objective       ED Triage Vitals [10/07/24 1631]   Temperature Pulse Blood Pressure Respirations SpO2 Patient Position - Orthostatic VS   99.9 °F (37.7 °C) 82 121/74 22 96 % Sitting      Temp Source Heart Rate Source BP Location FiO2 (%) Pain Score    Tympanic Monitor Right arm -- 8      Vitals      Date and Time Temp Pulse SpO2 Resp BP Pain Score FACES Pain Rating User   10/07/24 1631 99.9 °F (37.7 °C) 82 96 % 22 121/74 8 -- LS            Physical Exam  Vitals and nursing note reviewed.   Constitutional:       General: She is not in acute distress.     Appearance: Normal appearance. She is not ill-appearing, toxic-appearing or diaphoretic.      Comments: Well-appearing, nontoxic 100   HENT:      Head: Normocephalic and atraumatic.      Comments: Moist mucous membranes     Right Ear: External ear normal.      Left Ear: External ear normal.   Eyes:      General:         Right eye: No discharge.         Left eye: No discharge.   Cardiovascular:      Comments: Regular rate and rhythm, no murmurs rubs or gallops.  Extremities warm and well-perfused without mottling  Pulmonary:      Effort: No respiratory distress.      Comments: No increased work of breathing.  Speaking in complete sentences.  Lungs clear to auscultation bilaterally without wheezes, rales, rhonchi.  Satting 96% on room air indicating adequate oxygenation  Abdominal:      General: There is no  distension.      Comments: Abdomen soft, nontender, nondistended without rigidity, rebound, guarding   Musculoskeletal:         General: No deformity.      Cervical back: Normal range of motion.      Comments: No lower extremity swelling, erythema, or calf tenderness   Skin:     Findings: No lesion or rash.   Neurological:      Mental Status: She is alert and oriented to person, place, and time. Mental status is at baseline.   Psychiatric:         Mood and Affect: Mood and affect normal.         Results Reviewed       None            XR chest 1 view portable   ED Interpretation by Jesu Morrow MD (10/07 1721)   No acute cardiopulmonary abnormality          Procedures    ED Medication and Procedure Management   Prior to Admission Medications   Prescriptions Last Dose Informant Patient Reported? Taking?   ARIPiprazole (ABILIFY) 15 mg tablet   Yes No   Sig: Take 15 mg by mouth daily   Diclofenac Sodium (VOLTAREN) 1 %   No No   Sig: Apply 2 g topically 4 (four) times a day as needed (pain)   albuterol (ACCUNEB) 1.25 MG/3ML nebulizer solution   Yes No   Sig: Inhale 1 ampule every 6 (six) hours as needed   methocarbamol (ROBAXIN) 500 mg tablet   No No   Sig: Take 1 tablet (500 mg total) by mouth 2 (two) times a day as needed for muscle spasms   traZODone (DESYREL) 50 mg tablet   Yes No   Sig: Take 50 mg by mouth daily at bedtime      Facility-Administered Medications: None     Discharge Medication List as of 10/7/2024  5:22 PM        CONTINUE these medications which have NOT CHANGED    Details   albuterol (ACCUNEB) 1.25 MG/3ML nebulizer solution Inhale 1 ampule every 6 (six) hours as needed, Historical Med      ARIPiprazole (ABILIFY) 15 mg tablet Take 15 mg by mouth daily, Historical Med      Diclofenac Sodium (VOLTAREN) 1 % Apply 2 g topically 4 (four) times a day as needed (pain), Starting Wed 5/22/2024, Normal      methocarbamol (ROBAXIN) 500 mg tablet Take 1 tablet (500 mg total) by mouth 2 (two) times a day  as needed for muscle spasms, Starting Wed 5/22/2024, Normal      traZODone (DESYREL) 50 mg tablet Take 50 mg by mouth daily at bedtime, Historical Med           No discharge procedures on file.  ED SEPSIS DOCUMENTATION   Time reflects when diagnosis was documented in both MDM as applicable and the Disposition within this note       Time User Action Codes Description Comment    10/7/2024  5:21 PM Jesu Morrow Add [R07.9] Chest pain     10/7/2024  5:21 PM Jesu Morrow Add [R00.2] Palpitations                  Jesu Morrow MD  10/07/24 4501

## 2024-10-07 NOTE — Clinical Note
Mira Jameson was seen and treated in our emergency department on 10/7/2024.                Diagnosis:     Mira  .    She may return on this date: 10/08/2024         If you have any questions or concerns, please don't hesitate to call.      Jesu Morrow MD    ______________________________           _______________          _______________  Hospital Representative                              Date                                Time

## 2024-10-07 NOTE — DISCHARGE INSTRUCTIONS
Your EKG and chest x-ray today were normal.  Call the provided number to schedule nonemergent follow-up with cardiologist for further evaluation.  If you have any severe worsening shortness of breath, persistent palpitations, if you pass out, return to the emergency department.

## 2024-10-08 LAB
ATRIAL RATE: 75 BPM
ATRIAL RATE: 82 BPM
P AXIS: 49 DEGREES
P AXIS: 55 DEGREES
PR INTERVAL: 126 MS
PR INTERVAL: 130 MS
QRS AXIS: 48 DEGREES
QRS AXIS: 61 DEGREES
QRSD INTERVAL: 74 MS
QRSD INTERVAL: 78 MS
QT INTERVAL: 358 MS
QT INTERVAL: 360 MS
QTC INTERVAL: 402 MS
QTC INTERVAL: 418 MS
T WAVE AXIS: 25 DEGREES
T WAVE AXIS: 41 DEGREES
VENTRICULAR RATE: 75 BPM
VENTRICULAR RATE: 82 BPM

## 2024-10-08 PROCEDURE — 93010 ELECTROCARDIOGRAM REPORT: CPT | Performed by: INTERNAL MEDICINE

## 2024-12-30 ENCOUNTER — OFFICE VISIT (OUTPATIENT)
Age: 24
End: 2024-12-30

## 2024-12-30 VITALS
TEMPERATURE: 98.5 F | WEIGHT: 198.7 LBS | HEART RATE: 69 BPM | RESPIRATION RATE: 18 BRPM | DIASTOLIC BLOOD PRESSURE: 80 MMHG | HEIGHT: 64 IN | OXYGEN SATURATION: 99 % | SYSTOLIC BLOOD PRESSURE: 117 MMHG | BODY MASS INDEX: 33.92 KG/M2

## 2024-12-30 DIAGNOSIS — J45.20 MILD INTERMITTENT ASTHMA WITHOUT COMPLICATION: ICD-10-CM

## 2024-12-30 DIAGNOSIS — Z23 ENCOUNTER FOR IMMUNIZATION: ICD-10-CM

## 2024-12-30 DIAGNOSIS — Z11.4 SCREENING FOR HIV (HUMAN IMMUNODEFICIENCY VIRUS): ICD-10-CM

## 2024-12-30 DIAGNOSIS — N89.8 VAGINAL DISCHARGE: ICD-10-CM

## 2024-12-30 DIAGNOSIS — F32.A DEPRESSION, UNSPECIFIED DEPRESSION TYPE: ICD-10-CM

## 2024-12-30 DIAGNOSIS — E28.2 PCOS (POLYCYSTIC OVARIAN SYNDROME): Primary | ICD-10-CM

## 2024-12-30 DIAGNOSIS — E66.811 CLASS 1 OBESITY WITH BODY MASS INDEX (BMI) OF 34.0 TO 34.9 IN ADULT, UNSPECIFIED OBESITY TYPE, UNSPECIFIED WHETHER SERIOUS COMORBIDITY PRESENT: ICD-10-CM

## 2024-12-30 DIAGNOSIS — Z11.59 NEED FOR HEPATITIS C SCREENING TEST: ICD-10-CM

## 2024-12-30 PROCEDURE — 90471 IMMUNIZATION ADMIN: CPT

## 2024-12-30 PROCEDURE — 90673 RIV3 VACCINE NO PRESERV IM: CPT

## 2024-12-30 PROCEDURE — 99204 OFFICE O/P NEW MOD 45 MIN: CPT | Performed by: FAMILY MEDICINE

## 2024-12-30 RX ORDER — ALBUTEROL SULFATE 90 UG/1
2 INHALANT RESPIRATORY (INHALATION) EVERY 6 HOURS PRN
COMMUNITY
End: 2024-12-30 | Stop reason: SDUPTHER

## 2024-12-30 RX ORDER — ALBUTEROL SULFATE 90 UG/1
2 INHALANT RESPIRATORY (INHALATION) EVERY 6 HOURS PRN
Qty: 18 G | Refills: 3 | Status: SHIPPED | OUTPATIENT
Start: 2024-12-30

## 2024-12-30 RX ORDER — FLUCONAZOLE 150 MG/1
150 TABLET ORAL ONCE
Qty: 1 TABLET | Refills: 0 | Status: SHIPPED | OUTPATIENT
Start: 2024-12-30 | End: 2024-12-30

## 2024-12-30 RX ORDER — ALBUTEROL SULFATE 1.25 MG/3ML
1.25 SOLUTION RESPIRATORY (INHALATION) EVERY 6 HOURS PRN
Qty: 3 ML | Refills: 5 | Status: SHIPPED | OUTPATIENT
Start: 2024-12-30

## 2024-12-30 NOTE — PATIENT INSTRUCTIONS
[email or web address]

## 2024-12-30 NOTE — PROGRESS NOTES
Name: Mira Jameson      : 2000      MRN: [id number]  Encounter Provider: Celena Mcgill MD  Encounter Date: 2024   Encounter department: [facility]  :  Assessment & Plan  PCOS (polycystic ovarian syndrome)  Oligomenorrhea and hirsutism diagnostic based on Rotterdam Criteria. Does not need imaging for confirmation. Did discuss with patient and to obtain baseline labwork to exclude other treatable conditions that can mimic PCOS and treat metabolic disturbances   Pt is also obese and has depression. Consider discussing NEVIN symptoms and sleep medicine.   Did discuss treatment is based on symptoms. Due to pt's menstrual irregularities, hirsutism, and acne, would discuss hormonal contraception which pt is interested in and metformin. Based on labs and obesity, advise lifestyle modification and would more strongly consider metformin if signs of insulin resistance.   Pt and  express understanding of the above. Obtain baseline labwork first.   F/u 1 month with lab results to discuss treatment options further.     Orders:    Hemoglobin A1C; Future    Comprehensive metabolic panel; Future    Lipid panel; Future    TSH, 3rd generation with Free T4 reflex; Future    Prolactin; Future    17-Hydroxyprogesterone; Future    Pregnancy Test (HCG Qualitative); Future    Mild intermittent asthma without complication  Pt reports asthma diagnosis since childhood on albuterol inhaler and nebs as needed. Currently exacerbations only once a month and mostly due to cat hair which pt is allergic too but does not want to part with her cats.   Albuterol inhaler and nebs refilled   Encouraged to take allergy medication which pt is hesitant to take  Flonase nasal spray   Other precautions to avoid triggers as best as possible    Orders:    albuterol (ACCUNEB) 1.25 MG/3ML nebulizer solution; Take 3 mL (1.25 mg total) by nebulization every 6 (six) hours as needed for wheezing or shortness of  breath    albuterol (PROVENTIL HFA,VENTOLIN HFA) 90 mcg/act inhaler; Inhale 2 puffs every 6 (six) hours as needed for wheezing    Depression, unspecified depression type  Pt reported diagnosis of bipolar, schizoaffective d/o and ADHD. Pt stopped taking medications including abilify and trazodone as she lacked insurance. Now with insurance she is interested in re-establishing with psychiatry.     Depression Screening Follow-up Plan: Patient's depression screening was positive with a PHQ-2 score of 4. Their PHQ-9 score was 19. Patient assessed for underlying major depression. They have no active suicidal ideations. Brief counseling provided and recommend additional follow-up/re-evaluation next office visit. Referral to psych and social work for behavioral health resources.  reports they will try to schedule with [facility] for family services where he current sees his psychiatrist. Also interested in therapy    Orders:    Ambulatory referral to Psych Services; Future    Ambulatory Referral to Social Work Care Management Program; Future    Class 1 obesity with body mass index (BMI) of 34.0 to 34.9 in adult, unspecified obesity type, unspecified whether serious comorbidity present  BMI 34.11 unintentional weight gain. Also related to PCOS   Lifestyle modification, extensive nutrition and exercise counseling  Labwork ordered   Consider referral to nutritionist or weight management if pt is interested   Will review labwork and treat underlying conditions including PCOS     Orders:    Hemoglobin A1C; Future    Comprehensive metabolic panel; Future    Lipid panel; Future    TSH, 3rd generation with Free T4 reflex; Future    Vaginal discharge  Pt reported white thick discharge with fishy odor. Unable to complete pelvic exam due to time constraints. Given description will presumptively treat as yeast infection with 1 time dose of diflucan. Hygiene education provided.   Encouraged pt to return for annual gyn for f/u. Pt has  "never had a pap smear    Orders:    fluconazole (DIFLUCAN) 150 mg tablet; Take 1 tablet (150 mg total) by mouth once for 1 dose    Need for hepatitis C screening test    Orders:    Hepatitis C Antibody; Future    Screening for HIV (human immunodeficiency virus)    Orders:    HIV 1/2 AG/AB w Reflex [facility] for 2 yr old and above; Future    Encounter for immunization    Orders:    influenza vaccine, recombinant, PF, 0.5 mL IM (Flublok)           History of Present Illness     HPI  25 yo new patient presenting to [facility] care. Presenting with  who was seen earlier today.     Has multiple concerns specifically worried she has PCOS.   Concerned about weight gain.  Irregular periods sometimes goes 6-8 months without period. Gotten worse as gotten older even though she was told it would improve from teenager years. Even skipped almost 1 year. Still has symptoms of PMS which pt and  report are significant.   Greyish/white thick vaginal discharge. White and clumpy. Sometimes comes more often, sometimes comes before period. Smells fishy. But  does not smell it though his nose is \"messed up from COVID.\" Pt does endorse discharge today.  Excessive hair growth on body, dark discoloration to axillas. Acne. Always hair on legs and arms but facial hair is new and new genital hair and armpit hair. It's more than the usual amount. A lot and thicker now. Pt reports she shaves but it keeps coming back thick and dark.   No sex drive. Sexually active with . Has not used protection the whole time. And has not gotten pregnant so pt thought it was a hormonal thing or she wasn't ovulating   Also endorses excessive thirst and urination    Also needs med refills on albuterol and nebs   Asthma for years since childhood. Used to get asthma attacks when exerting, when running or doing track. Now getting it from cats. Two cats, pt is allergic, cats are very fluffy, has a lot of hair but cats do not allow them to brush " "them. Hair is everywhere. Pt is very fond of her cats and cannot bear to part with them. However she also does not like medication so despite  prompting has not started an allergy medication. Pt also reports asthma attacks are infrequent maybe once every few weeks or once a month.     Stopped seeing psychiatry because did not have insurance. Stopped medications because she thought medication was causing weight gain. Realized it's not medication because stopped taking them but still gained weight. Pt does report some frustration that she was misdiagnosed so many times in the past. Got upset because some of the pills weren't the right one. States she has been diagnosed with bipolar, schizoaffective d/o, and ADHD.       Review of Systems   Constitutional:  Positive for unexpected weight change. Negative for chills and fever.   HENT:  Negative for ear pain and sore throat.    Eyes:  Negative for pain and visual disturbance.   Respiratory:  Positive for shortness of breath. Negative for cough.    Cardiovascular:  Negative for chest pain and palpitations.   Gastrointestinal:  Negative for abdominal pain and vomiting.   Endocrine: Positive for polydipsia and polyuria.   Genitourinary:  Positive for menstrual problem and vaginal discharge. Negative for dysuria and hematuria.   Musculoskeletal:  Negative for arthralgias and back pain.   Skin:  Negative for color change and rash.   Neurological:  Negative for seizures and syncope.   Psychiatric/Behavioral:  Positive for dysphoric mood.    All other systems reviewed and are negative.      Objective   /80 (BP Location: Right arm, Patient Position: Sitting, Cuff Size: Large)   Pulse 69   Temp 98.5 °F (36.9 °C) (Tympanic)   Resp 18   Ht 5' 4\" (1.626 m)   Wt 90.1 kg (198 lb 11.2 oz)   SpO2 99%   BMI 34.11 kg/m²      Physical Exam  Vitals reviewed.   Constitutional:       General: She is not in acute distress.     Appearance: Normal appearance. She is " well-developed. She is obese.   HENT:      Head: Normocephalic and atraumatic.      Right Ear: External ear normal.      Left Ear: External ear normal.      Nose: Nose normal.      Mouth/Throat:      Mouth: Mucous membranes are moist.      Pharynx: Oropharynx is clear.   Eyes:      Extraocular Movements: Extraocular movements intact.      Conjunctiva/sclera: Conjunctivae normal.   Cardiovascular:      Rate and Rhythm: Normal rate and regular rhythm.   Pulmonary:      Effort: Pulmonary effort is normal. No respiratory distress.   Abdominal:      Palpations: Abdomen is soft.   Musculoskeletal:         General: No swelling. Normal range of motion.      Cervical back: Neck supple.   Skin:     General: Skin is warm and dry.      Capillary Refill: Capillary refill takes less than 2 seconds.      Findings: No rash.   Neurological:      General: No focal deficit present.      Mental Status: She is alert. Mental status is at baseline.           Celena Mcgill MD  [facility]  Date: 12/30/2024 Time: 4:30 PM

## 2024-12-31 ENCOUNTER — TELEPHONE (OUTPATIENT)
Age: 24
End: 2024-12-31

## 2024-12-31 NOTE — ASSESSMENT & PLAN NOTE
Oligomenorrhea and hirsutism diagnostic based on Rotterdam Criteria. Does not need imaging for confirmation. Did discuss with patient and to obtain baseline labwork to exclude other treatable conditions that can mimic PCOS and treat metabolic disturbances   Pt is also obese and has depression. Consider discussing NEVIN symptoms and sleep medicine.   Did discuss treatment is based on symptoms. Due to pt's menstrual irregularities, hirsutism, and acne, would discuss hormonal contraception which pt is interested in and metformin. Based on labs and obesity, advise lifestyle modification and would more strongly consider metformin if signs of insulin resistance.   Pt and  express understanding of the above. Obtain baseline labwork first.   F/u 1 month with lab results to discuss treatment options further.     Orders:    Hemoglobin A1C; Future    Comprehensive metabolic panel; Future    Lipid panel; Future    TSH, 3rd generation with Free T4 reflex; Future    Prolactin; Future    17-Hydroxyprogesterone; Future    Pregnancy Test (HCG Qualitative); Future

## 2024-12-31 NOTE — ASSESSMENT & PLAN NOTE
Pt reported diagnosis of bipolar, schizoaffective d/o and ADHD. Pt stopped taking medications including abilify and trazodone as she lacked insurance. Now with insurance she is interested in re-establishing with psychiatry.     Depression Screening Follow-up Plan: Patient's depression screening was positive with a PHQ-2 score of 4. Their PHQ-9 score was 19. Patient assessed for underlying major depression. They have no active suicidal ideations. Brief counseling provided and recommend additional follow-up/re-evaluation next office visit. Referral to psych and social work for behavioral health resources.  reports they will try to schedule with center for family [facility] where he current sees his psychiatrist. Also interested in therapy    Orders:    Ambulatory referral to Psych Services; Future    Ambulatory Referral to Social Work Care Management Program; Future

## 2024-12-31 NOTE — ASSESSMENT & PLAN NOTE
Pt reports asthma diagnosis since childhood on albuterol inhaler and nebs as needed. Currently exacerbations only once a month and mostly due to cat hair which pt is allergic too but does not want to part with her cats.   Albuterol inhaler and nebs refilled   Encouraged to take allergy medication which pt is hesitant to take  Flonase nasal spray   Other precautions to avoid triggers as best as possible    Orders:    albuterol (ACCUNEB) 1.25 MG/3ML nebulizer solution; Take 3 mL (1.25 mg total) by nebulization every 6 (six) hours as needed for wheezing or shortness of breath    albuterol (PROVENTIL HFA,VENTOLIN HFA) 90 mcg/act inhaler; Inhale 2 puffs every 6 (six) hours as needed for wheezing

## 2024-12-31 NOTE — TELEPHONE ENCOUNTER
Was calling pt in regards to routine referral. LVM for pt to contact intake dept.     Insurance is OON. Extra resource packet can be mailed.     First attempt

## 2024-12-31 NOTE — ASSESSMENT & PLAN NOTE
BMI 34.11 unintentional weight gain. Also related to PCOS   Lifestyle modification, extensive nutrition and exercise counseling  Labwork ordered   Consider referral to nutritionist or weight management if pt is interested   Will review labwork and treat underlying conditions including PCOS     Orders:    Hemoglobin A1C; Future    Comprehensive metabolic panel; Future    Lipid panel; Future    TSH, 3rd generation with Free T4 reflex; Future

## 2025-01-03 ENCOUNTER — HOSPITAL ENCOUNTER (EMERGENCY)
Facility: HOSPITAL | Age: 25
Discharge: HOME | End: 2025-01-03
Attending: EMERGENCY MEDICINE | Admitting: EMERGENCY MEDICINE
Payer: COMMERCIAL

## 2025-01-03 ENCOUNTER — TELEPHONE (OUTPATIENT)
Age: 25
End: 2025-01-03

## 2025-01-03 VITALS
TEMPERATURE: 98.7 F | HEART RATE: 109 BPM | BODY MASS INDEX: 34.47 KG/M2 | SYSTOLIC BLOOD PRESSURE: 126 MMHG | RESPIRATION RATE: 22 BRPM | DIASTOLIC BLOOD PRESSURE: 75 MMHG | WEIGHT: 200.8 LBS | OXYGEN SATURATION: 98 %

## 2025-01-03 DIAGNOSIS — K08.89 PAIN, DENTAL: Primary | ICD-10-CM

## 2025-01-03 PROCEDURE — 99282 EMERGENCY DEPT VISIT SF MDM: CPT

## 2025-01-03 PROCEDURE — 99284 EMERGENCY DEPT VISIT MOD MDM: CPT | Performed by: EMERGENCY MEDICINE

## 2025-01-03 NOTE — TELEPHONE ENCOUNTER
Contacted Pt. in regards to ROUTINE Referral, LVM to contact [phone] to discuss services needed at this time in order to be added to proper wait list.    Patient has [place] Medicaid, Insurance is OON, patient can be sent resource packet.

## 2025-01-04 NOTE — ED PROVIDER NOTES
Time reflects when diagnosis was documented in both MDM as applicable and the Disposition within this note       Time User Action Codes Description Comment    1/3/2025 11:27 PM Zain Whaley Add [K08.89] Pain, dental           ED Disposition       ED Disposition   Discharge    Condition   Stable    Date/Time   Fri Jason 3, 2025 11:27 PM    Comment   Mira Jameson discharge to home/self care.                   Assessment & Plan       Medical Decision Making  24-year-old female, presenting with dental pain.  Differential diagnosis includes wisdom tooth impaction, dental abscess, Wagner's angina among other diagnoses.  On exam, patient has impacted left lower wisdom tooth causing irritation.  No signs of acute infection.  Patient states she has been taking Tylenol PM which helps her with the pain and sleeping.  Discussed with patient need to follow-up with dentist, oral surgeon for further evaluation and treatment.             Medications - No data to display    ED Risk Strat Scores                          SBIRT 20yo+      Flowsheet Row Most Recent Value   Initial Alcohol Screen: US AUDIT-C     1. How often do you have a drink containing alcohol? 0 Filed at: 01/03/2025 2322   2. How many drinks containing alcohol do you have on a typical day you are drinking?  0 Filed at: 01/03/2025 2322   3b. FEMALE Any Age, or MALE 65+: How often do you have 4 or more drinks on one occassion? 0 Filed at: 01/03/2025 2322   Audit-C Score 0 Filed at: 01/03/2025 2322   CRYSTAL: How many times in the past year have you...    Used an illegal drug or used a prescription medication for non-medical reasons? Never Filed at: 01/03/2025 4905                            History of Present Illness       Chief Complaint   Patient presents with    Dental Pain     Pt states she has had dental pain in the back near wisdom teeth for 3 months. Today she states pain was severe and she was unable to eat or drink much. Has seen dentist who said wisdom teeth  were impacted but unable to get procedure.        Past Medical History:   Diagnosis Date    Asthma     Bipolar and related disorder (HCC)     Eczema     Schizo affective schizophrenia (HCC)     provided by friends/family      History reviewed. No pertinent surgical history.   Family History   Adopted: Yes   Problem Relation Age of Onset    Diabetes Mother     Bipolar disorder Mother     Schizoaffective Disorder  Mother       Social History     Tobacco Use    Smoking status: Former     Current packs/day: 0.25     Types: Cigarettes    Smokeless tobacco: Never   Vaping Use    Vaping status: Former    Substances: Nicotine, Flavoring   Substance Use Topics    Alcohol use: Not Currently    Drug use: Not Currently     Comment: former marijuana use      E-Cigarette/Vaping    E-Cigarette Use Former User       E-Cigarette/Vaping Substances    Nicotine Yes     THC No     CBD No     Flavoring Yes     Other No     Unknown No       I have reviewed and agree with the history as documented.     24-year-old female, presents with dental pain.  Patient states she was told she had impacted wisdom teeth, has developed worsening pain in left lower posterior mouth.  Denies any fevers, no difficulty swallowing or breathing.      History provided by:  Patient   used: No    Dental Pain  Associated symptoms: no fever        Review of Systems   Constitutional: Negative.  Negative for fever.   Respiratory: Negative.     Gastrointestinal: Negative.            Objective       ED Triage Vitals [01/03/25 2314]   Temperature Pulse Blood Pressure Respirations SpO2 Patient Position - Orthostatic VS   98.7 °F (37.1 °C) (!) 109 126/75 22 98 % Sitting      Temp Source Heart Rate Source BP Location FiO2 (%) Pain Score    Oral Monitor Left arm -- 10 - Worst Possible Pain      Vitals      Date and Time Temp Pulse SpO2 Resp BP Pain Score FACES Pain Rating User   01/03/25 2314 98.7 °F (37.1 °C) 109 98 % 22 126/75 10 - Worst Possible Pain  -- AM            Physical Exam  Vitals and nursing note reviewed.   Constitutional:       General: She is not in acute distress.  HENT:      Head: Normocephalic and atraumatic.      Comments: No facial swelling     Mouth/Throat:      Mouth: Mucous membranes are moist.      Pharynx: Oropharynx is clear.      Comments: Impacted left lower wisdom tooth with irritation in gums, no erythema or swelling.  No swelling under tongue or in posterior oropharynx.  Cardiovascular:      Rate and Rhythm: Tachycardia present.   Pulmonary:      Effort: Pulmonary effort is normal.      Breath sounds: No stridor.   Musculoskeletal:      Cervical back: Normal range of motion and neck supple. No rigidity.   Lymphadenopathy:      Cervical: No cervical adenopathy.   Neurological:      General: No focal deficit present.      Mental Status: She is alert and oriented to person, place, and time.         Results Reviewed       None            No orders to display       Procedures    ED Medication and Procedure Management   Prior to Admission Medications   Prescriptions Last Dose Informant Patient Reported? Taking?   ARIPiprazole (ABILIFY) 15 mg tablet   Yes No   Sig: Take 15 mg by mouth daily   Patient not taking: Reported on 12/30/2024   Diclofenac Sodium (VOLTAREN) 1 %   No No   Sig: Apply 2 g topically 4 (four) times a day as needed (pain)   Patient not taking: Reported on 12/30/2024   albuterol (ACCUNEB) 1.25 MG/3ML nebulizer solution   No No   Sig: Take 3 mL (1.25 mg total) by nebulization every 6 (six) hours as needed for wheezing or shortness of breath   albuterol (PROVENTIL HFA,VENTOLIN HFA) 90 mcg/act inhaler   No No   Sig: Inhale 2 puffs every 6 (six) hours as needed for wheezing   methocarbamol (ROBAXIN) 500 mg tablet   No No   Sig: Take 1 tablet (500 mg total) by mouth 2 (two) times a day as needed for muscle spasms   Patient not taking: Reported on 12/30/2024   traZODone (DESYREL) 50 mg tablet   Yes No   Sig: Take 50 mg by mouth  daily at bedtime   Patient not taking: Reported on 12/30/2024      Facility-Administered Medications: None     Discharge Medication List as of 1/3/2025 11:27 PM        CONTINUE these medications which have NOT CHANGED    Details   albuterol (ACCUNEB) 1.25 MG/3ML nebulizer solution Take 3 mL (1.25 mg total) by nebulization every 6 (six) hours as needed for wheezing or shortness of breath, Starting Mon 12/30/2024, Normal      albuterol (PROVENTIL HFA,VENTOLIN HFA) 90 mcg/act inhaler Inhale 2 puffs every 6 (six) hours as needed for wheezing, Starting Mon 12/30/2024, Normal      ARIPiprazole (ABILIFY) 15 mg tablet Take 15 mg by mouth daily, Historical Med      Diclofenac Sodium (VOLTAREN) 1 % Apply 2 g topically 4 (four) times a day as needed (pain), Starting Wed 5/22/2024, Normal      methocarbamol (ROBAXIN) 500 mg tablet Take 1 tablet (500 mg total) by mouth 2 (two) times a day as needed for muscle spasms, Starting Wed 5/22/2024, Normal      traZODone (DESYREL) 50 mg tablet Take 50 mg by mouth daily at bedtime, Historical Med           No discharge procedures on file.  ED SEPSIS DOCUMENTATION   Time reflects when diagnosis was documented in both MDM as applicable and the Disposition within this note       Time User Action Codes Description Comment    1/3/2025 11:27 PM Zain Whaley Add [K08.89] Pain, dental                  Zain Whaley MD  01/03/25 7169

## 2025-01-06 ENCOUNTER — OFFICE VISIT (OUTPATIENT)
Age: 25
End: 2025-01-06

## 2025-01-06 VITALS
RESPIRATION RATE: 18 BRPM | OXYGEN SATURATION: 98 % | HEART RATE: 96 BPM | HEIGHT: 64 IN | BODY MASS INDEX: 33.63 KG/M2 | DIASTOLIC BLOOD PRESSURE: 75 MMHG | SYSTOLIC BLOOD PRESSURE: 109 MMHG | WEIGHT: 197 LBS

## 2025-01-06 DIAGNOSIS — K08.89 PAIN, DENTAL: Primary | ICD-10-CM

## 2025-01-06 DIAGNOSIS — K21.9 GASTROESOPHAGEAL REFLUX DISEASE, UNSPECIFIED WHETHER ESOPHAGITIS PRESENT: ICD-10-CM

## 2025-01-06 DIAGNOSIS — H61.22 IMPACTED CERUMEN, LEFT EAR: ICD-10-CM

## 2025-01-06 PROCEDURE — 99213 OFFICE O/P EST LOW 20 MIN: CPT | Performed by: FAMILY MEDICINE

## 2025-01-06 RX ORDER — PANTOPRAZOLE SODIUM 40 MG/1
40 TABLET, DELAYED RELEASE ORAL DAILY
Qty: 30 TABLET | Refills: 1 | Status: SHIPPED | OUTPATIENT
Start: 2025-01-06

## 2025-01-06 RX ORDER — NAPROXEN 500 MG/1
500 TABLET ORAL 2 TIMES DAILY WITH MEALS
Qty: 8 TABLET | Refills: 0 | Status: SHIPPED | OUTPATIENT
Start: 2025-01-06 | End: 2025-01-10

## 2025-01-06 NOTE — PROGRESS NOTES
Name: Mira Jameson      : 2000      MRN: [id number]  Encounter Provider: Donya Loepz MD  Encounter Date: 2025   Encounter department: [facility]  :  Assessment & Plan  Pain, dental  Patient presents to the clinic for follow-up of ED visit.  She was seen in the ED on 25 for dental pain.  Examination significant for wisdom teeth with no signs of infection.  Patient was discharged with pain medications.  Patient states that she saw her dentist today morning and got x-rays done.  As per the dentist Dr Mcclellan, she did not have any acute infection and had impacted lower wisdom teeth causing irritation and some swelling.  She has a surgery scheduled on 2025 for wisdom teeth removal.   She states that the pain continues to be severe for the last few days.  She has been taking Tylenol and ibuprofen as needed. She is able to tolerate p.o. intake but has some pain when she eats or drinks.  She denies any bad breath, hard time opening her mouth.    Examination revealed impacted wisdom teeth on both sides with some radiation, no erythema or swelling present.  No tenderness or bleeding gums present    Plan  4-day course of naproxen for dental pain  Discussed with patient that there is no need for antibiotics unless no infection present  Continue with use of ice packs for pain  Follow-up with dentist for surgery on   Orders:    naproxen (Naprosyn) 500 mg tablet; Take 1 tablet (500 mg total) by mouth 2 (two) times a day with meals for 4 days    Gastroesophageal reflux disease, unspecified whether esophagitis present  Patient has had complaint of acid reflux heartburn since last few months.  She complains of retrosternal burning pain and pressure sensation in her chest.  She denies any nausea, vomiting, halitosis.   No red flag findings such as unintentional weight loss, vomiting, anemia, dysphagia present    Started patient on 8 week trial of  Protonix.  If patient shows good response with no complications after 8 weeks, then discontinue Protonix  Lifestyle changes recommended she is eating small portions, avoid eating any symptoms before bedtime, avoid foods that trigger symptoms, elevate the head of the bed  Advised patient to lose weight  Handout Given to patient regarding GERD    Orders:    pantoprazole (PROTONIX) 40 mg tablet; Take 1 tablet (40 mg total) by mouth daily    Impacted cerumen, left ear  Patient complains of some pain, fullness in her left ear and buzzing sensation in her left ear.  No other complaints  On examination, significant earwax present in the left ear    Starting patient on Debrox eardrops to be used twice a day  Can use other ceruminolytic solutions such as mineral oil or baby oil  Follow-up in clinic for in- office earwax removal if still persistent  Orders:    carbamide peroxide (DEBROX) 6.5 % otic solution; Administer 5 drops into both ears 2 (two) times a day           History of Present Illness     Lab review is a 24-year-old female with past medical history of asthma, depression presenting to clinic for follow-up of her ED visit.  She was seen in the ED on 1/3/2025 for dental pain.  She saw the dentist today where she had x-rays done that revealed impacted wisdom teeth without any infection.  She has a surgery for wisdom teeth removal scheduled on June 30, 2025.  She states that the pain is persisting more on the left side.  She also complains of some ear pain on her left side. She states that she still has to get her labs done for PCOS workup.  She denies any other complaints today      Review of Systems   Constitutional:  Negative for chills and fever.   HENT:  Positive for dental problem and ear pain. Negative for sore throat.    Eyes:  Negative for pain and visual disturbance.   Respiratory:  Negative for cough and shortness of breath.    Cardiovascular:  Negative for chest pain and palpitations.   Gastrointestinal:   "Negative for abdominal pain and vomiting.        Retrosternal burning pain acid reflux symptoms   Genitourinary: Negative.  Negative for dysuria and hematuria.   Musculoskeletal:  Negative for arthralgias and back pain.   Skin:  Negative for color change and rash.   Neurological: Negative.  Negative for seizures and syncope.   Psychiatric/Behavioral:  The patient is nervous/anxious.    All other systems reviewed and are negative.      Objective   /75 (BP Location: Right arm, Patient Position: Sitting)   Pulse 76   Resp 18   Ht 5' 3.5\" (1.613 m)   Wt 89.4 kg (197 lb)   LMP  (LMP Unknown)   SpO2 98%   BMI 34.35 kg/m²      Physical Exam  Vitals and nursing note reviewed.   Constitutional:       General: She is not in acute distress.     Appearance: She is well-developed.   HENT:      Head: Normocephalic and atraumatic.      Left Ear: There is impacted cerumen.      Mouth/Throat:      Comments: Impacted left upper and lower wisdom tooth with irritation in gums, no erythema.  Eyes:      Conjunctiva/sclera: Conjunctivae normal.   Cardiovascular:      Rate and Rhythm: Normal rate and regular rhythm.      Pulses: Normal pulses.      Heart sounds: Normal heart sounds. No murmur heard.  Pulmonary:      Effort: Pulmonary effort is normal. No respiratory distress.      Breath sounds: Normal breath sounds.   Abdominal:      Palpations: Abdomen is soft.      Tenderness: There is no abdominal tenderness.   Musculoskeletal:         General: No swelling.      Cervical back: Neck supple.   Skin:     General: Skin is warm and dry.      Capillary Refill: Capillary refill takes less than 2 seconds.   Neurological:      Mental Status: She is alert.   Psychiatric:         Mood and Affect: Mood normal.         "

## 2025-01-06 NOTE — TELEPHONE ENCOUNTER
Contacted Pt. in regards to ROUTINE Referral, LVM to contact [phone] to discuss services needed at this time in order to be added to proper wait list.    Patient has [place] Medicaid, Insurance is OON, patient can be sent resource packet.     Attempt #3  Referral closed

## 2025-01-07 ENCOUNTER — ANESTHESIA (OUTPATIENT)
Dept: PERIOP | Facility: HOSPITAL | Age: 25
End: 2025-01-07
Payer: COMMERCIAL

## 2025-01-07 ENCOUNTER — HOSPITAL ENCOUNTER (OUTPATIENT)
Facility: HOSPITAL | Age: 25
Setting detail: OUTPATIENT SURGERY
LOS: 1 days | Discharge: HOME | End: 2025-01-08
Admitting: SPECIALIST
Payer: COMMERCIAL

## 2025-01-07 ENCOUNTER — APPOINTMENT (EMERGENCY)
Dept: RADIOLOGY | Facility: HOSPITAL | Age: 25
End: 2025-01-07
Payer: COMMERCIAL

## 2025-01-07 ENCOUNTER — ANESTHESIA EVENT (OUTPATIENT)
Dept: PERIOP | Facility: HOSPITAL | Age: 25
End: 2025-01-07
Payer: COMMERCIAL

## 2025-01-07 DIAGNOSIS — G89.18 POSTOPERATIVE PAIN: ICD-10-CM

## 2025-01-07 DIAGNOSIS — K37 APPENDICITIS: Primary | ICD-10-CM

## 2025-01-07 PROBLEM — K35.30 ACUTE APPENDICITIS WITH LOCALIZED PERITONITIS, WITHOUT PERFORATION, ABSCESS, OR GANGRENE: Status: ACTIVE | Noted: 2025-01-07

## 2025-01-07 PROBLEM — J45.40 MODERATE PERSISTENT ASTHMA WITHOUT COMPLICATION: Status: ACTIVE | Noted: 2025-01-07

## 2025-01-07 LAB
ALBUMIN SERPL BCG-MCNC: 4.3 G/DL (ref 3.5–5)
ALP SERPL-CCNC: 69 U/L (ref 34–104)
ALT SERPL W P-5'-P-CCNC: 14 U/L (ref 7–52)
ANION GAP SERPL CALCULATED.3IONS-SCNC: 2 MMOL/L (ref 4–13)
AST SERPL W P-5'-P-CCNC: 15 U/L (ref 13–39)
BACTERIA UR QL AUTO: ABNORMAL /HPF
BASOPHILS # BLD AUTO: 0.02 THOUSANDS/ΜL (ref 0–0.1)
BASOPHILS NFR BLD AUTO: 0 % (ref 0–1)
BILIRUB SERPL-MCNC: 0.29 MG/DL (ref 0.2–1)
BILIRUB UR QL STRIP: NEGATIVE
BUN SERPL-MCNC: 12 MG/DL (ref 5–25)
CALCIUM SERPL-MCNC: 9 MG/DL (ref 8.4–10.2)
CHLORIDE SERPL-SCNC: 105 MMOL/L (ref 96–108)
CLARITY UR: ABNORMAL
CO2 SERPL-SCNC: 26 MMOL/L (ref 21–32)
COLOR UR: YELLOW
CREAT SERPL-MCNC: 0.88 MG/DL (ref 0.6–1.3)
EOSINOPHIL # BLD AUTO: 0.06 THOUSAND/ΜL (ref 0–0.61)
EOSINOPHIL NFR BLD AUTO: 1 % (ref 0–6)
ERYTHROCYTE [DISTWIDTH] IN BLOOD BY AUTOMATED COUNT: 13.7 % (ref 11.6–15.1)
EXT PREGNANCY TEST URINE: NEGATIVE
EXT. CONTROL: NORMAL
GFR SERPL CREATININE-BSD FRML MDRD: 93 ML/MIN/1.73SQ M
GLUCOSE SERPL-MCNC: 101 MG/DL (ref 65–140)
GLUCOSE UR STRIP-MCNC: NEGATIVE MG/DL
HCT VFR BLD AUTO: 37.6 % (ref 34.8–46.1)
HGB BLD-MCNC: 11.8 G/DL (ref 11.5–15.4)
HGB UR QL STRIP.AUTO: ABNORMAL
IMM GRANULOCYTES # BLD AUTO: 0.03 THOUSAND/UL (ref 0–0.2)
IMM GRANULOCYTES NFR BLD AUTO: 0 % (ref 0–2)
KETONES UR STRIP-MCNC: NEGATIVE MG/DL
LEUKOCYTE ESTERASE UR QL STRIP: ABNORMAL
LIPASE SERPL-CCNC: 13 U/L (ref 11–82)
LYMPHOCYTES # BLD AUTO: 1.65 THOUSANDS/ΜL (ref 0.6–4.47)
LYMPHOCYTES NFR BLD AUTO: 19 % (ref 14–44)
MCH RBC QN AUTO: 28.2 PG (ref 26.8–34.3)
MCHC RBC AUTO-ENTMCNC: 31.4 G/DL (ref 31.4–37.4)
MCV RBC AUTO: 90 FL (ref 82–98)
MONOCYTES # BLD AUTO: 0.51 THOUSAND/ΜL (ref 0.17–1.22)
MONOCYTES NFR BLD AUTO: 6 % (ref 4–12)
MUCOUS THREADS UR QL AUTO: ABNORMAL
NEUTROPHILS # BLD AUTO: 6.4 THOUSANDS/ΜL (ref 1.85–7.62)
NEUTS SEG NFR BLD AUTO: 74 % (ref 43–75)
NITRITE UR QL STRIP: NEGATIVE
NON-SQ EPI CELLS URNS QL MICRO: ABNORMAL /HPF
NRBC BLD AUTO-RTO: 0 /100 WBCS
PH UR STRIP.AUTO: 6 [PH]
PLATELET # BLD AUTO: 362 THOUSANDS/UL (ref 149–390)
PMV BLD AUTO: 9.4 FL (ref 8.9–12.7)
POTASSIUM SERPL-SCNC: 3.7 MMOL/L (ref 3.5–5.3)
PROT SERPL-MCNC: 7.1 G/DL (ref 6.4–8.4)
PROT UR STRIP-MCNC: ABNORMAL MG/DL
RBC # BLD AUTO: 4.18 MILLION/UL (ref 3.81–5.12)
RBC #/AREA URNS AUTO: ABNORMAL /HPF
SODIUM SERPL-SCNC: 133 MMOL/L (ref 135–147)
SP GR UR STRIP.AUTO: >=1.03 (ref 1–1.03)
UROBILINOGEN UR STRIP-ACNC: <2 MG/DL
WBC # BLD AUTO: 8.67 THOUSAND/UL (ref 4.31–10.16)
WBC #/AREA URNS AUTO: ABNORMAL /HPF

## 2025-01-07 PROCEDURE — 88304 TISSUE EXAM BY PATHOLOGIST: CPT | Performed by: STUDENT IN AN ORGANIZED HEALTH CARE EDUCATION/TRAINING PROGRAM

## 2025-01-07 PROCEDURE — 99284 EMERGENCY DEPT VISIT MOD MDM: CPT

## 2025-01-07 PROCEDURE — 36415 COLL VENOUS BLD VENIPUNCTURE: CPT | Performed by: PHYSICIAN ASSISTANT

## 2025-01-07 PROCEDURE — 44970 LAPAROSCOPY APPENDECTOMY: CPT | Performed by: PHYSICIAN ASSISTANT

## 2025-01-07 PROCEDURE — 99205 OFFICE O/P NEW HI 60 MIN: CPT | Performed by: SPECIALIST

## 2025-01-07 PROCEDURE — 74177 CT ABD & PELVIS W/CONTRAST: CPT

## 2025-01-07 PROCEDURE — 87086 URINE CULTURE/COLONY COUNT: CPT | Performed by: PHYSICIAN ASSISTANT

## 2025-01-07 PROCEDURE — 80053 COMPREHEN METABOLIC PANEL: CPT | Performed by: PHYSICIAN ASSISTANT

## 2025-01-07 PROCEDURE — 81025 URINE PREGNANCY TEST: CPT | Performed by: PHYSICIAN ASSISTANT

## 2025-01-07 PROCEDURE — 44970 LAPAROSCOPY APPENDECTOMY: CPT | Performed by: SPECIALIST

## 2025-01-07 PROCEDURE — 83690 ASSAY OF LIPASE: CPT | Performed by: PHYSICIAN ASSISTANT

## 2025-01-07 PROCEDURE — 96361 HYDRATE IV INFUSION ADD-ON: CPT

## 2025-01-07 PROCEDURE — 81001 URINALYSIS AUTO W/SCOPE: CPT | Performed by: PHYSICIAN ASSISTANT

## 2025-01-07 PROCEDURE — 99285 EMERGENCY DEPT VISIT HI MDM: CPT | Performed by: PHYSICIAN ASSISTANT

## 2025-01-07 PROCEDURE — 85025 COMPLETE CBC W/AUTO DIFF WBC: CPT | Performed by: PHYSICIAN ASSISTANT

## 2025-01-07 PROCEDURE — 96374 THER/PROPH/DIAG INJ IV PUSH: CPT

## 2025-01-07 RX ORDER — HYDROMORPHONE HCL/PF 1 MG/ML
0.2 SYRINGE (ML) INJECTION
Refills: 0 | Status: DISCONTINUED | OUTPATIENT
Start: 2025-01-07 | End: 2025-01-08 | Stop reason: HOSPADM

## 2025-01-07 RX ORDER — SODIUM CHLORIDE, SODIUM LACTATE, POTASSIUM CHLORIDE, CALCIUM CHLORIDE 600; 310; 30; 20 MG/100ML; MG/100ML; MG/100ML; MG/100ML
20 INJECTION, SOLUTION INTRAVENOUS CONTINUOUS
Status: DISCONTINUED | OUTPATIENT
Start: 2025-01-07 | End: 2025-01-07

## 2025-01-07 RX ORDER — FENTANYL CITRATE 50 UG/ML
INJECTION, SOLUTION INTRAMUSCULAR; INTRAVENOUS AS NEEDED
Status: DISCONTINUED | OUTPATIENT
Start: 2025-01-07 | End: 2025-01-07

## 2025-01-07 RX ORDER — OXYCODONE AND ACETAMINOPHEN 5; 325 MG/1; MG/1
1 TABLET ORAL ONCE AS NEEDED
Status: DISCONTINUED | OUTPATIENT
Start: 2025-01-07 | End: 2025-01-07

## 2025-01-07 RX ORDER — ONDANSETRON 2 MG/ML
4 INJECTION INTRAMUSCULAR; INTRAVENOUS ONCE
Status: COMPLETED | OUTPATIENT
Start: 2025-01-07 | End: 2025-01-07

## 2025-01-07 RX ORDER — ACETAMINOPHEN 325 MG/1
650 TABLET ORAL EVERY 6 HOURS PRN
Status: DISCONTINUED | OUTPATIENT
Start: 2025-01-07 | End: 2025-01-08 | Stop reason: HOSPADM

## 2025-01-07 RX ORDER — PANTOPRAZOLE SODIUM 40 MG/1
40 TABLET, DELAYED RELEASE ORAL DAILY
Status: DISCONTINUED | OUTPATIENT
Start: 2025-01-07 | End: 2025-01-08 | Stop reason: HOSPADM

## 2025-01-07 RX ORDER — SODIUM CHLORIDE, SODIUM LACTATE, POTASSIUM CHLORIDE, CALCIUM CHLORIDE 600; 310; 30; 20 MG/100ML; MG/100ML; MG/100ML; MG/100ML
100 INJECTION, SOLUTION INTRAVENOUS CONTINUOUS
Status: DISCONTINUED | OUTPATIENT
Start: 2025-01-07 | End: 2025-01-07

## 2025-01-07 RX ORDER — MAGNESIUM HYDROXIDE 1200 MG/15ML
LIQUID ORAL AS NEEDED
Status: DISCONTINUED | OUTPATIENT
Start: 2025-01-07 | End: 2025-01-07 | Stop reason: HOSPADM

## 2025-01-07 RX ORDER — DOCUSATE SODIUM 100 MG/1
100 CAPSULE, LIQUID FILLED ORAL 2 TIMES DAILY
Status: DISCONTINUED | OUTPATIENT
Start: 2025-01-07 | End: 2025-01-08 | Stop reason: HOSPADM

## 2025-01-07 RX ORDER — ENOXAPARIN SODIUM 100 MG/ML
40 INJECTION SUBCUTANEOUS DAILY
Status: DISCONTINUED | OUTPATIENT
Start: 2025-01-07 | End: 2025-01-08 | Stop reason: HOSPADM

## 2025-01-07 RX ORDER — TRAZODONE HYDROCHLORIDE 50 MG/1
50 TABLET, FILM COATED ORAL
Status: DISCONTINUED | OUTPATIENT
Start: 2025-01-07 | End: 2025-01-08 | Stop reason: HOSPADM

## 2025-01-07 RX ORDER — OXYCODONE HYDROCHLORIDE 10 MG/1
10 TABLET ORAL EVERY 4 HOURS PRN
Refills: 0 | Status: DISCONTINUED | OUTPATIENT
Start: 2025-01-07 | End: 2025-01-08 | Stop reason: HOSPADM

## 2025-01-07 RX ORDER — LIDOCAINE HYDROCHLORIDE 10 MG/ML
INJECTION, SOLUTION EPIDURAL; INFILTRATION; INTRACAUDAL; PERINEURAL AS NEEDED
Status: DISCONTINUED | OUTPATIENT
Start: 2025-01-07 | End: 2025-01-07

## 2025-01-07 RX ORDER — FENTANYL CITRATE/PF 50 MCG/ML
50 SYRINGE (ML) INJECTION
Status: DISCONTINUED | OUTPATIENT
Start: 2025-01-07 | End: 2025-01-07

## 2025-01-07 RX ORDER — CEFAZOLIN SODIUM 2 G/50ML
2000 SOLUTION INTRAVENOUS EVERY 8 HOURS
Status: DISCONTINUED | OUTPATIENT
Start: 2025-01-07 | End: 2025-01-08 | Stop reason: HOSPADM

## 2025-01-07 RX ORDER — OXYCODONE HYDROCHLORIDE 5 MG/1
5 TABLET ORAL EVERY 4 HOURS PRN
Refills: 0 | Status: DISCONTINUED | OUTPATIENT
Start: 2025-01-07 | End: 2025-01-08 | Stop reason: HOSPADM

## 2025-01-07 RX ORDER — PROPOFOL 10 MG/ML
INJECTION, EMULSION INTRAVENOUS AS NEEDED
Status: DISCONTINUED | OUTPATIENT
Start: 2025-01-07 | End: 2025-01-07

## 2025-01-07 RX ORDER — ALBUTEROL SULFATE 90 UG/1
2 INHALANT RESPIRATORY (INHALATION) EVERY 6 HOURS PRN
Status: DISCONTINUED | OUTPATIENT
Start: 2025-01-07 | End: 2025-01-08 | Stop reason: HOSPADM

## 2025-01-07 RX ORDER — METRONIDAZOLE 500 MG/100ML
500 INJECTION, SOLUTION INTRAVENOUS EVERY 8 HOURS
Status: DISCONTINUED | OUTPATIENT
Start: 2025-01-07 | End: 2025-01-07

## 2025-01-07 RX ORDER — ONDANSETRON 2 MG/ML
4 INJECTION INTRAMUSCULAR; INTRAVENOUS EVERY 6 HOURS PRN
Status: DISCONTINUED | OUTPATIENT
Start: 2025-01-07 | End: 2025-01-08 | Stop reason: HOSPADM

## 2025-01-07 RX ORDER — MIDAZOLAM HYDROCHLORIDE 2 MG/2ML
INJECTION, SOLUTION INTRAMUSCULAR; INTRAVENOUS AS NEEDED
Status: DISCONTINUED | OUTPATIENT
Start: 2025-01-07 | End: 2025-01-07

## 2025-01-07 RX ORDER — POLYETHYLENE GLYCOL 3350 17 G/17G
17 POWDER, FOR SOLUTION ORAL DAILY
Status: DISCONTINUED | OUTPATIENT
Start: 2025-01-07 | End: 2025-01-08 | Stop reason: HOSPADM

## 2025-01-07 RX ORDER — DIPHENHYDRAMINE HYDROCHLORIDE 50 MG/ML
12.5 INJECTION INTRAMUSCULAR; INTRAVENOUS ONCE AS NEEDED
Status: DISCONTINUED | OUTPATIENT
Start: 2025-01-07 | End: 2025-01-07

## 2025-01-07 RX ORDER — SODIUM CHLORIDE, SODIUM LACTATE, POTASSIUM CHLORIDE, CALCIUM CHLORIDE 600; 310; 30; 20 MG/100ML; MG/100ML; MG/100ML; MG/100ML
75 INJECTION, SOLUTION INTRAVENOUS CONTINUOUS
Status: DISPENSED | OUTPATIENT
Start: 2025-01-07 | End: 2025-01-08

## 2025-01-07 RX ORDER — BUPIVACAINE HYDROCHLORIDE AND EPINEPHRINE 5; 5 MG/ML; UG/ML
INJECTION, SOLUTION PERINEURAL AS NEEDED
Status: DISCONTINUED | OUTPATIENT
Start: 2025-01-07 | End: 2025-01-07 | Stop reason: HOSPADM

## 2025-01-07 RX ORDER — KETOROLAC TROMETHAMINE 30 MG/ML
INJECTION, SOLUTION INTRAMUSCULAR; INTRAVENOUS AS NEEDED
Status: DISCONTINUED | OUTPATIENT
Start: 2025-01-07 | End: 2025-01-07

## 2025-01-07 RX ORDER — HYDROMORPHONE HCL/PF 1 MG/ML
0.5 SYRINGE (ML) INJECTION
Status: DISCONTINUED | OUTPATIENT
Start: 2025-01-07 | End: 2025-01-07

## 2025-01-07 RX ORDER — DEXAMETHASONE SODIUM PHOSPHATE 10 MG/ML
INJECTION, SOLUTION INTRAMUSCULAR; INTRAVENOUS AS NEEDED
Status: DISCONTINUED | OUTPATIENT
Start: 2025-01-07 | End: 2025-01-07

## 2025-01-07 RX ORDER — ONDANSETRON 2 MG/ML
4 INJECTION INTRAMUSCULAR; INTRAVENOUS ONCE AS NEEDED
Status: DISCONTINUED | OUTPATIENT
Start: 2025-01-07 | End: 2025-01-07 | Stop reason: HOSPADM

## 2025-01-07 RX ORDER — METHOCARBAMOL 500 MG/1
500 TABLET, FILM COATED ORAL 2 TIMES DAILY PRN
Status: DISCONTINUED | OUTPATIENT
Start: 2025-01-07 | End: 2025-01-08 | Stop reason: HOSPADM

## 2025-01-07 RX ORDER — ONDANSETRON 2 MG/ML
INJECTION INTRAMUSCULAR; INTRAVENOUS AS NEEDED
Status: DISCONTINUED | OUTPATIENT
Start: 2025-01-07 | End: 2025-01-07

## 2025-01-07 RX ORDER — ROCURONIUM BROMIDE 10 MG/ML
INJECTION, SOLUTION INTRAVENOUS AS NEEDED
Status: DISCONTINUED | OUTPATIENT
Start: 2025-01-07 | End: 2025-01-07

## 2025-01-07 RX ADMIN — SODIUM CHLORIDE, SODIUM LACTATE, POTASSIUM CHLORIDE, AND CALCIUM CHLORIDE: .6; .31; .03; .02 INJECTION, SOLUTION INTRAVENOUS at 14:29

## 2025-01-07 RX ADMIN — LIDOCAINE HYDROCHLORIDE 50 MG: 10 INJECTION, SOLUTION EPIDURAL; INFILTRATION; INTRACAUDAL; PERINEURAL at 14:41

## 2025-01-07 RX ADMIN — FENTANYL CITRATE 50 MCG: 50 INJECTION INTRAMUSCULAR; INTRAVENOUS at 16:34

## 2025-01-07 RX ADMIN — POLYETHYLENE GLYCOL 3350 17 G: 17 POWDER, FOR SOLUTION ORAL at 17:36

## 2025-01-07 RX ADMIN — ONDANSETRON 4 MG: 2 INJECTION INTRAMUSCULAR; INTRAVENOUS at 14:50

## 2025-01-07 RX ADMIN — KETOROLAC TROMETHAMINE 30 MG: 30 INJECTION, SOLUTION INTRAMUSCULAR; INTRAVENOUS at 15:50

## 2025-01-07 RX ADMIN — TRAZODONE HYDROCHLORIDE 50 MG: 50 TABLET ORAL at 22:44

## 2025-01-07 RX ADMIN — SODIUM CHLORIDE, SODIUM LACTATE, POTASSIUM CHLORIDE, AND CALCIUM CHLORIDE 75 ML/HR: .6; .31; .03; .02 INJECTION, SOLUTION INTRAVENOUS at 17:16

## 2025-01-07 RX ADMIN — CEFAZOLIN SODIUM 2000 MG: 2 SOLUTION INTRAVENOUS at 22:44

## 2025-01-07 RX ADMIN — SUGAMMADEX 200 MG: 100 INJECTION, SOLUTION INTRAVENOUS at 15:38

## 2025-01-07 RX ADMIN — CEFAZOLIN SODIUM 2000 MG: 2 SOLUTION INTRAVENOUS at 14:28

## 2025-01-07 RX ADMIN — SODIUM CHLORIDE 1000 ML: 0.9 INJECTION, SOLUTION INTRAVENOUS at 08:11

## 2025-01-07 RX ADMIN — ONDANSETRON 4 MG: 2 INJECTION INTRAMUSCULAR; INTRAVENOUS at 08:11

## 2025-01-07 RX ADMIN — PROPOFOL 200 MG: 10 INJECTION, EMULSION INTRAVENOUS at 14:41

## 2025-01-07 RX ADMIN — DOCUSATE SODIUM 100 MG: 100 CAPSULE, LIQUID FILLED ORAL at 17:36

## 2025-01-07 RX ADMIN — ROCURONIUM BROMIDE 50 MG: 10 INJECTION, SOLUTION INTRAVENOUS at 14:42

## 2025-01-07 RX ADMIN — DEXAMETHASONE SODIUM PHOSPHATE 10 MG: 10 INJECTION, SOLUTION INTRAMUSCULAR; INTRAVENOUS at 14:50

## 2025-01-07 RX ADMIN — METRONIDAZOLE: 500 INJECTION, SOLUTION INTRAVENOUS at 14:48

## 2025-01-07 RX ADMIN — FENTANYL CITRATE 50 MCG: 50 INJECTION INTRAMUSCULAR; INTRAVENOUS at 16:46

## 2025-01-07 RX ADMIN — MIDAZOLAM 2 MG: 1 INJECTION INTRAMUSCULAR; INTRAVENOUS at 14:29

## 2025-01-07 RX ADMIN — FENTANYL CITRATE 100 MCG: 50 INJECTION, SOLUTION INTRAMUSCULAR; INTRAVENOUS at 14:40

## 2025-01-07 RX ADMIN — IOHEXOL 100 ML: 350 INJECTION, SOLUTION INTRAVENOUS at 09:14

## 2025-01-07 NOTE — ANESTHESIA POSTPROCEDURE EVALUATION
Post-Op Assessment Note    CV Status:  Stable  Pain Score: 0    Pain management: adequate       Mental Status:  Arousable   Hydration Status:  Stable   PONV Controlled:  None   Airway Patency:  Patent     Post Op Vitals Reviewed: Yes    No anethesia notable event occurred.    Staff: Anesthesiologist, CRNA           Last Filed PACU Vitals:  Vitals Value Taken Time   Temp 97.2 °F (36.2 °C) 01/07/25 1605   Pulse 104 01/07/25 1605   /74 01/07/25 1605   Resp 16 01/07/25 1605   SpO2 100 % 01/07/25 1605

## 2025-01-07 NOTE — ANESTHESIA POSTPROCEDURE EVALUATION
Post-Op Assessment Note    Last Filed PACU Vitals:  Vitals Value Taken Time   Temp 97.2 °F (36.2 °C) 01/07/25 1605   Pulse 92 01/07/25 1700   /62 01/07/25 1700   Resp 16 01/07/25 1700   SpO2 95 % 01/07/25 1700       Modified Ritesh:     Vitals Value Taken Time   Activity 2 01/07/25 1615   Respiration 2 01/07/25 1615   Circulation 2 01/07/25 1615   Consciousness 2 01/07/25 1615   Oxygen Saturation 2 01/07/25 1615     Modified Ritesh Score: 10

## 2025-01-07 NOTE — ASSESSMENT & PLAN NOTE
AVSS, leukocytosis, early stages of appendicitis, CT scan exhibits inflammatory changes and dilated appendix, McBurney point tenderness, migratory pain to the right lower quadrant, nausea and vomiting, anorexia    -Case request for laparoscopic appendectomy  -Consent obtained  -Coordinated with the OR team  -NPO  -IV fluids lactated ringer 125 mL/h  -IV antibiotics with Ancef and Flagyl  -As needed analgesia  -Antiemetic  -Continue home medications  -Bowel regimen for constipation

## 2025-01-07 NOTE — ANESTHESIA PREPROCEDURE EVALUATION
Procedure:  APPENDECTOMY LAPAROSCOPIC (Abdomen)    Relevant Problems   NEURO/PSYCH   (+) Depression      PULMONARY   (+) Mild intermittent asthma without complication   (+) Moderate persistent asthma without complication      Obstetrics/Gynecology   (+) PCOS (polycystic ovarian syndrome)      Rheumatology   (+) Acute appendicitis with localized peritonitis, without perforation, abscess, or gangrene        Physical Exam    Airway    Mallampati score: II  TM Distance: >3 FB  Neck ROM: full     Dental   Comment: Denies loose teeth     Cardiovascular  Cardiovascular exam normal    Pulmonary  Pulmonary exam normal     Other Findings  Portions of exam deferred due to low yield and/or unknown COVID statuspost-pubertal.      Anesthesia Plan  ASA Score- 2 Emergent    Anesthesia Type- general with ASA Monitors.         Additional Monitors:     Airway Plan: ETT.           Plan Factors-Exercise tolerance (METS): >4 METS.    Chart reviewed.   Existing labs reviewed. Patient summary reviewed.    Patient is not a current smoker.              Induction- intravenous.    Postoperative Plan-     Perioperative Resuscitation Plan - Level 1 - Full Code.       Informed Consent- Anesthetic plan and risks discussed with patient.  I personally reviewed this patient with the CRNA. Discussed and agreed on the Anesthesia Plan with the CRNA..

## 2025-01-07 NOTE — H&P
H&P - Surgery-General   Name: Mira Jameson 24 y.o. female I MRN: [id number]  Unit/Bed#: ED 05 I Date of Admission: 1/7/2025   Date of Service: 1/7/2025 I Hospital Day: 0     Assessment & Plan  Acute appendicitis with localized peritonitis, without perforation, abscess, or gangrene  AVSS, leukocytosis, early stages of appendicitis, CT scan exhibits inflammatory changes and dilated appendix, McBurney point tenderness, migratory pain to the right lower quadrant, nausea and vomiting, anorexia    -Case request for laparoscopic appendectomy  -Consent obtained  -Coordinated with the OR team  -NPO  -IV fluids lactated ringer 125 mL/h  -IV antibiotics with Ancef and Flagyl  -As needed analgesia  -Antiemetic  -Continue home medications  -Bowel regimen for constipation  Moderate persistent asthma without complication  No recent PFTs, exacerbation approximately 2 weeks, not on any long-acting agent, uses rescue inhaler approximately every 2 weeks, not following pulmonology    -Continue with albuterol as needed  -Follow-up with pulmonology and PFTs as an outpatient    History of Present Illness   Mira Jameson is a 24 y.o. female with a past medical history pertinent for schizoaffective disorder, asthma, constipation over the several months presented to the acute care surgery team pending because of abdominal pain migratory to the right lower quadrant.  Patient reports that since yesterday started having the onset of abdominal pain it was more diffuse in nature and in the lower quadrants that felt like menses cramps.  Patient assumed that she was developing menstrual cycle.  Patient then had a worsening of her menstrual cramps in conjunction with nausea, vomiting, diarrhea.  Patient had a lack of appetite.  Patient has had noted history of constipation for several months which is relatively new to her.  Patient thought possibly she was having constipation   Or a stomach bug.  Patient did not have any other sick contacts around  her.   ate the same food from [employer] from yesterday at approximately 5 PM and was not sick.  Patient's pain then continued to gradually worsen by the morning to the right lower quadrant and right flank.  Patient now is surprised by how localized her pain is in the right lower quadrant.  Patient did feel like she had chills but did not have a documented fever.  Patient has no other surgical history.  Patient has not had any sort of issues with her asthma other than 2 weeks ago she did have a exacerbation requiring albuterol.  Patient does report that she has semiroutine exacerbations requiring rescue inhaler possibly every 1-2 more weeks depending on allergens.  Patient is overweight admittedly so.  Patient reports that she does have some degree of discomfort with going up 1 flight to 2 flights of stairs if she gets short of breath but can do it.  Patient can walk 4-5 blocks without any issues.  Patient does not have any other cardiac or pulmonary history that she is aware of.  Patient did not have anything to eat.  Patient is not on any blood thinners.  Patient is amenable to surgery.    Review of Systems   Constitutional:  Positive for appetite change and chills. Negative for activity change, fatigue and fever.   HENT:  Negative for congestion and rhinorrhea.    Respiratory:  Negative for cough, chest tightness, shortness of breath and wheezing.    Cardiovascular:  Negative for chest pain and palpitations.   Gastrointestinal:  Positive for abdominal pain, diarrhea, nausea and vomiting. Negative for abdominal distention, blood in stool and constipation.   Genitourinary:  Negative for difficulty urinating and dysuria.   Musculoskeletal:  Negative for arthralgias and gait problem.   Skin:  Negative for color change and wound.   Neurological:  Negative for dizziness and weakness.   Psychiatric/Behavioral:  Negative for agitation and confusion.      I have reviewed the patient's PMH, PSH, Social History,  Family History, Meds, and Allergies  Historical Information   Past Medical History:   Diagnosis Date    Asthma     Bipolar and related disorder (HCC)     Eczema     Schizo affective schizophrenia (HCC)     provided by friends/family     No past surgical history on file.  Social History     Tobacco Use    Smoking status: Former     Current packs/day: 0.25     Types: Cigarettes    Smokeless tobacco: Never   Vaping Use    Vaping status: Former    Substances: Nicotine, Flavoring   Substance and Sexual Activity    Alcohol use: Not Currently    Drug use: Not Currently     Comment: former marijuana use    Sexual activity: Yes     Partners: Male     Birth control/protection: None     E-Cigarette/Vaping    E-Cigarette Use Former User      E-Cigarette/Vaping Substances    Nicotine Yes     THC No     CBD No     Flavoring Yes     Other No     Unknown No      Family history non-contributory  Social History     Tobacco Use    Smoking status: Former     Current packs/day: 0.25     Types: Cigarettes    Smokeless tobacco: Never   Vaping Use    Vaping status: Former    Substances: Nicotine, Flavoring   Substance and Sexual Activity    Alcohol use: Not Currently    Drug use: Not Currently     Comment: former marijuana use    Sexual activity: Yes     Partners: Male     Birth control/protection: None     No current facility-administered medications for this encounter.  Prior to Admission Medications   Prescriptions Last Dose Informant Patient Reported? Taking?   ARIPiprazole (ABILIFY) 15 mg tablet   Yes No   Sig: Take 15 mg by mouth daily   Patient not taking: Reported on 1/6/2025   Diclofenac Sodium (VOLTAREN) 1 %   No No   Sig: Apply 2 g topically 4 (four) times a day as needed (pain)   Patient not taking: Reported on 1/6/2025   albuterol (ACCUNEB) 1.25 MG/3ML nebulizer solution   No No   Sig: Take 3 mL (1.25 mg total) by nebulization every 6 (six) hours as needed for wheezing or shortness of breath   albuterol (PROVENTIL HFA,VENTOLIN  HFA) 90 mcg/act inhaler   No No   Sig: Inhale 2 puffs every 6 (six) hours as needed for wheezing   carbamide peroxide (DEBROX) 6.5 % otic solution   No No   Sig: Administer 5 drops into both ears 2 (two) times a day   methocarbamol (ROBAXIN) 500 mg tablet   No No   Sig: Take 1 tablet (500 mg total) by mouth 2 (two) times a day as needed for muscle spasms   Patient not taking: Reported on 1/6/2025   naproxen (Naprosyn) 500 mg tablet   No No   Sig: Take 1 tablet (500 mg total) by mouth 2 (two) times a day with meals for 4 days   pantoprazole (PROTONIX) 40 mg tablet   No No   Sig: Take 1 tablet (40 mg total) by mouth daily   traZODone (DESYREL) 50 mg tablet   Yes No   Sig: Take 50 mg by mouth daily at bedtime   Patient not taking: Reported on 1/6/2025      Facility-Administered Medications: None     Cat hair extract and Shellfish-derived products - food allergy    Objective :  Temp:  [99.3 °F (37.4 °C)] 99.3 °F (37.4 °C)  HR:  [83-96] 88  BP: (115-141)/(66-81) 130/81  Resp:  [17-18] 17  SpO2:  [96 %-99 %] 99 %  O2 Device: None (Room air)      Physical Exam  Constitutional:       General: She is awake. She is not in acute distress.     Appearance: Normal appearance. She is normal weight. She is not ill-appearing, toxic-appearing or diaphoretic.      Interventions: She is not intubated.  HENT:      Head: Normocephalic and atraumatic.      Right Ear: Hearing and external ear normal.      Left Ear: Hearing and external ear normal.      Nose: Nose normal.   Cardiovascular:      Rate and Rhythm: Normal rate and regular rhythm.      Heart sounds: Normal heart sounds, S1 normal and S2 normal. Heart sounds not distant. No murmur heard.  Pulmonary:      Effort: Pulmonary effort is normal. No tachypnea, bradypnea, accessory muscle usage, prolonged expiration, respiratory distress or retractions. She is not intubated.      Breath sounds: Normal breath sounds. No stridor, decreased air movement or transmitted upper airway sounds.  No decreased breath sounds, wheezing, rhonchi or rales.   Abdominal:      General: Abdomen is flat. Bowel sounds are normal. There is no distension.      Palpations: Abdomen is soft.      Tenderness: There is abdominal tenderness in the right lower quadrant. There is guarding. Positive signs include McBurney's sign. Negative signs include Rovsing's sign, psoas sign and obturator sign.   Skin:     General: Skin is warm and dry.      Capillary Refill: Capillary refill takes less than 2 seconds.   Psychiatric:         Behavior: Behavior is cooperative.         Lab Results: I have reviewed the following results:  Recent Labs     01/07/25  0810   WBC 8.67   HGB 11.8   HCT 37.6      SODIUM 133*   K 3.7      CO2 26   BUN 12   CREATININE 0.88   GLUC 101   AST 15   ALT 14   ALB 4.3   TBILI 0.29   ALKPHOS 69       Imaging Results Review: I reviewed radiology reports from this admission including: CT abdomen/pelvis.  Other Study Results Review: No additional pertinent studies reviewed.    VTE Pharmacologic Prophylaxis: prep for OR  VTE Mechanical Prophylaxis: sequential compression device    Administrative Statements   I have spent a total time of 45 minutes in caring for this patient on the day of the visit/encounter including Diagnostic results, Prognosis, Risks and benefits of tx options, Instructions for management, Patient and family education, Importance of tx compliance, Risk factor reductions, Impressions, Counseling / Coordination of care, Documenting in the medical record, Reviewing / ordering tests, medicine, procedures  , Obtaining or reviewing history  , and Communicating with other healthcare professionals .

## 2025-01-07 NOTE — ED PROVIDER NOTES
Time reflects when diagnosis was documented in both MDM as applicable and the Disposition within this note       Time User Action Codes Description Comment    1/7/2025 10:25 AM Duane Haddad Add [K37] Appendicitis           ED Disposition       ED Disposition   Admit    Condition   Stable    Date/Time   Tue Jan 7, 2025 10:17 AM    Comment   Case was discussed with Yury To PA-C and the patient's admission status was agreed to be Admission Status: inpatient status to the service of Dr. Hart .               Assessment & Plan       Medical Decision Making  Differential dx includes colitis, appendicitis, gastroenteritis, ureteral stone, other intraabdominal pathology. U/A shows 10-20 wbc, 2-4 rbc, mod bacteria, mod epith cells. Pt reports no urinary symptoms. Suspect this is contaminated specimen. Radiologist called me to state CT abd/pelvis is suspicious for appendicitis. Secure chatted surgical GIULIA To who evaluated patient in the ED. Patient admitted to Dr Hart's service.     Amount and/or Complexity of Data Reviewed  Labs: ordered.  Radiology: ordered.    Risk  Prescription drug management.  Decision regarding hospitalization.             Medications   sodium chloride 0.9 % bolus 1,000 mL (1,000 mL Intravenous New Bag 1/7/25 0811)   ondansetron (ZOFRAN) injection 4 mg (4 mg Intravenous Given 1/7/25 0811)   iohexol (OMNIPAQUE) 350 MG/ML injection (MULTI-DOSE) 100 mL (100 mL Intravenous Given 1/7/25 0914)       ED Risk Strat Scores                          SBIRT 22yo+      Flowsheet Row Most Recent Value   Initial Alcohol Screen: US AUDIT-C     1. How often do you have a drink containing alcohol? 0 Filed at: 01/07/2025 0719   2. How many drinks containing alcohol do you have on a typical day you are drinking?  0 Filed at: 01/07/2025 0719   3a. Male UNDER 65: How often do you have five or more drinks on one occasion? 0 Filed at: 01/07/2025 0719   3b. FEMALE Any Age, or MALE 65+: How often do  you have 4 or more drinks on one occassion? 0 Filed at: 01/07/2025 0719   Audit-C Score 0 Filed at: 01/07/2025 0719   CRYSTAL: How many times in the past year have you...    Used an illegal drug or used a prescription medication for non-medical reasons? Never Filed at: 01/07/2025 0719                            History of Present Illness       Chief Complaint   Patient presents with    Abdominal Pain    Nausea    Vomiting    Diarrhea     C/o abd    Flank Pain     C/o right flank pain radiates to RLQ N/V/D started last night denies symptoms on urination.        Past Medical History:   Diagnosis Date    Asthma     Bipolar and related disorder (HCC)     Eczema     Schizo affective schizophrenia (HCC)     provided by friends/family      No past surgical history on file.   Family History   Adopted: Yes   Problem Relation Age of Onset    Diabetes Mother     Bipolar disorder Mother     Schizoaffective Disorder  Mother       Social History     Tobacco Use    Smoking status: Former     Current packs/day: 0.25     Types: Cigarettes    Smokeless tobacco: Never   Vaping Use    Vaping status: Former    Substances: Nicotine, Flavoring   Substance Use Topics    Alcohol use: Not Currently    Drug use: Not Currently     Comment: former marijuana use      E-Cigarette/Vaping    E-Cigarette Use Former User       E-Cigarette/Vaping Substances    Nicotine Yes     THC No     CBD No     Flavoring Yes     Other No     Unknown No       I have reviewed and agree with the history as documented.     Patient is a 23 yo female with history of asthma who reports onset yesterday of diarrhea, then bilateral lower abdominal cramping pain last night. Woke middle of the night with pain in R flank and has had vomiting. No fever or chills. No urinary symptoms. No recent foreign travel or antibiotics. No sick contacts. No other complaints           Review of Systems   Constitutional:  Negative for chills and fever.   Respiratory:  Negative for shortness of  breath.    Cardiovascular:  Negative for chest pain.   Gastrointestinal:  Positive for abdominal pain, diarrhea, nausea and vomiting. Negative for blood in stool.   Genitourinary:  Positive for flank pain. Negative for dysuria and hematuria.   Musculoskeletal:  Negative for back pain.           Objective       ED Triage Vitals [01/07/25 0716]   Temperature Pulse Blood Pressure Respirations SpO2 Patient Position - Orthostatic VS   99.3 °F (37.4 °C) 96 141/81 18 96 % Lying      Temp Source Heart Rate Source BP Location FiO2 (%) Pain Score    Oral Monitor Right arm -- 9      Vitals      Date and Time Temp Pulse SpO2 Resp BP Pain Score FACES Pain Rating User   01/07/25 1100 -- 83 98 % 17 120/74 -- -- MD   01/07/25 1000 -- 94 98 % 18 132/80 5 -- MD   01/07/25 0800 -- 87 96 % 18 115/66 -- -- MD   01/07/25 0716 99.3 °F (37.4 °C) 96 96 % 18 141/81 9 -- MD            Physical Exam  Vitals and nursing note reviewed.   Constitutional:       General: She is not in acute distress.     Appearance: She is well-developed. She is not ill-appearing, toxic-appearing or diaphoretic.   HENT:      Head: Normocephalic and atraumatic.      Mouth/Throat:      Mouth: Mucous membranes are moist.      Pharynx: Oropharynx is clear.   Eyes:      Extraocular Movements: Extraocular movements intact.      Pupils: Pupils are equal, round, and reactive to light.   Cardiovascular:      Rate and Rhythm: Normal rate and regular rhythm.      Heart sounds: Normal heart sounds.   Pulmonary:      Effort: Pulmonary effort is normal.      Breath sounds: Normal breath sounds.   Abdominal:      General: Abdomen is flat. Bowel sounds are normal.      Palpations: Abdomen is soft.      Tenderness: There is no right CVA tenderness, left CVA tenderness, guarding or rebound. Negative signs include Forte's sign, Rovsing's sign, McBurney's sign and psoas sign.      Comments: Bilateral lower abdominal tenderness     Skin:     General: Skin is warm and dry.       Capillary Refill: Capillary refill takes less than 2 seconds.   Neurological:      General: No focal deficit present.      Mental Status: She is alert and oriented to person, place, and time.         Results Reviewed       Procedure Component Value Units Date/Time    Urine Microscopic [[id number]]  (Abnormal) Collected: 01/07/25 0855    Lab Status: Final result Specimen: Urine, Clean Catch Updated: 01/07/25 1002     RBC, UA 2-4 /hpf      WBC, UA 10-20 /hpf      Epithelial Cells Moderate /hpf      Bacteria, UA Moderate /hpf      MUCUS THREADS Moderate    Urine culture [[id number]] Collected: 01/07/25 0855    Lab Status: In process Specimen: Urine, Clean Catch Updated: 01/07/25 1002    UA w Reflex to Microscopic w Reflex to Culture [[id number]]  (Abnormal) Collected: 01/07/25 0855    Lab Status: Final result Specimen: Urine, Clean Catch Updated: 01/07/25 0906     Color, UA Yellow     Clarity, UA Cloudy     Specific Gravity, UA >=1.030     pH, UA 6.0     Leukocytes, UA Large     Nitrite, UA Negative     Protein, UA Trace mg/dl      Glucose, UA Negative mg/dl      Ketones, UA Negative mg/dl      Urobilinogen, UA <2.0 mg/dl      Bilirubin, UA Negative     Occult Blood, UA Trace    POCT pregnancy, urine [[id number]]  (Normal) Collected: 01/07/25 0852    Lab Status: Final result Updated: 01/07/25 0855     EXT Preg Test, Ur Negative     Control Valid    Comprehensive metabolic panel [[id number]]  (Abnormal) Collected: 01/07/25 0810    Lab Status: Final result Specimen: Blood from Arm, Left Updated: 01/07/25 0840     Sodium 133 mmol/L      Potassium 3.7 mmol/L      Chloride 105 mmol/L      CO2 26 mmol/L      ANION GAP 2 mmol/L      BUN 12 mg/dL      Creatinine 0.88 mg/dL      Glucose 101 mg/dL      Calcium 9.0 mg/dL      AST 15 U/L      ALT 14 U/L      Alkaline Phosphatase 69 U/L      Total Protein 7.1 g/dL      Albumin 4.3 g/dL      Total Bilirubin 0.29 mg/dL      eGFR 93 ml/min/1.73sq m     Narrative:      National Kidney  Disease [employer] guidelines for Chronic Kidney Disease (CKD):     Stage 1 with normal or high GFR (GFR > 90 mL/min/1.73 square meters)    Stage 2 Mild CKD (GFR = 60-89 mL/min/1.73 square meters)    Stage 3A Moderate CKD (GFR = 45-59 mL/min/1.73 square meters)    Stage 3B Moderate CKD (GFR = 30-44 mL/min/1.73 square meters)    Stage 4 Severe CKD (GFR = 15-29 mL/min/1.73 square meters)    Stage 5 End Stage CKD (GFR <15 mL/min/1.73 square meters)  Note: GFR calculation is accurate only with a steady state creatinine    Lipase [[id number]]  (Normal) Collected: 01/07/25 0810    Lab Status: Final result Specimen: Blood from Arm, Left Updated: 01/07/25 0840     Lipase 13 u/L     CBC and differential [[id number]] Collected: 01/07/25 0810    Lab Status: Final result Specimen: Blood from Arm, Left Updated: 01/07/25 0823     WBC 8.67 Thousand/uL      RBC 4.18 Million/uL      Hemoglobin 11.8 g/dL      Hematocrit 37.6 %      MCV 90 fL      MCH 28.2 pg      MCHC 31.4 g/dL      RDW 13.7 %      MPV 9.4 fL      Platelets 362 Thousands/uL      nRBC 0 /100 WBCs      Segmented % 74 %      Immature Grans % 0 %      Lymphocytes % 19 %      Monocytes % 6 %      Eosinophils Relative 1 %      Basophils Relative 0 %      Absolute Neutrophils 6.40 Thousands/µL      Absolute Immature Grans 0.03 Thousand/uL      Absolute Lymphocytes 1.65 Thousands/µL      Absolute Monocytes 0.51 Thousand/µL      Eosinophils Absolute 0.06 Thousand/µL      Basophils Absolute 0.02 Thousands/µL             CT abdomen pelvis with contrast   Final Interpretation by Loyd Olsen MD (01/07 0937)      Findings suspicious for acute appendicitis. No evidence for perforation.      I personally discussed this study with JOSE HADDAD on 1/7/2025 9:36 AM.               Workstation performed: [id number]             CriticalCare Time    Date/Time: 1/7/2025 12:02 PM    Performed by: Jose Haddad PA-C  Authorized by: Jose Haddad PA-C    Critical care  provider statement:     Critical care time (minutes):  40    Critical care time was exclusive of:  Separately billable procedures and treating other patients and teaching time    Critical care was necessary to treat or prevent imminent or life-threatening deterioration of the following conditions: perforation, sepsis.    Critical care was time spent personally by me on the following activities:  Development of treatment plan with patient or surrogate, obtaining history from patient or surrogate, discussions with consultants, evaluation of patient's response to treatment, examination of patient, ordering and performing treatments and interventions, ordering and review of laboratory studies, ordering and review of radiographic studies, re-evaluation of patient's condition and review of old charts    I assumed direction of critical care for this patient from another provider in my specialty: no        ED Medication and Procedure Management   Prior to Admission Medications   Prescriptions Last Dose Informant Patient Reported? Taking?   ARIPiprazole (ABILIFY) 15 mg tablet   Yes No   Sig: Take 15 mg by mouth daily   Patient not taking: Reported on 1/6/2025   Diclofenac Sodium (VOLTAREN) 1 %   No No   Sig: Apply 2 g topically 4 (four) times a day as needed (pain)   Patient not taking: Reported on 1/6/2025   albuterol (ACCUNEB) 1.25 MG/3ML nebulizer solution   No No   Sig: Take 3 mL (1.25 mg total) by nebulization every 6 (six) hours as needed for wheezing or shortness of breath   albuterol (PROVENTIL HFA,VENTOLIN HFA) 90 mcg/act inhaler   No No   Sig: Inhale 2 puffs every 6 (six) hours as needed for wheezing   carbamide peroxide (DEBROX) 6.5 % otic solution   No No   Sig: Administer 5 drops into both ears 2 (two) times a day   methocarbamol (ROBAXIN) 500 mg tablet   No No   Sig: Take 1 tablet (500 mg total) by mouth 2 (two) times a day as needed for muscle spasms   Patient not taking: Reported on 1/6/2025   naproxen  (Naprosyn) 500 mg tablet   No No   Sig: Take 1 tablet (500 mg total) by mouth 2 (two) times a day with meals for 4 days   pantoprazole (PROTONIX) 40 mg tablet   No No   Sig: Take 1 tablet (40 mg total) by mouth daily   traZODone (DESYREL) 50 mg tablet   Yes No   Sig: Take 50 mg by mouth daily at bedtime   Patient not taking: Reported on 1/6/2025      Facility-Administered Medications: None     Patient's Medications   Discharge Prescriptions    No medications on file     No discharge procedures on file.  ED SEPSIS DOCUMENTATION   Time reflects when diagnosis was documented in both MDM as applicable and the Disposition within this note       Time User Action Codes Description Comment    1/7/2025 10:25 AM Duane Haddad Add [K37] Appendicitis                  Duane Haddad PA-C  01/07/25 7865

## 2025-01-07 NOTE — OP NOTE
General SurgeryAPPENDECTOMY LAPAROSCOPIC Op Note    Mira Jameson  1/7/2025    Pre-op Diagnosis:   Appendicitis [K37]  Patient Active Problem List   Diagnosis    Mild intermittent asthma without complication    PCOS (polycystic ovarian syndrome)    Depression    Class 1 obesity with body mass index (BMI) of 34.0 to 34.9 in adult    Acute appendicitis with localized peritonitis, without perforation, abscess, or gangrene    Moderate persistent asthma without complication       Post-op Diagnosis:  Post-Op Diagnosis Codes:     * Appendicitis [K37]  Patient Active Problem List   Diagnosis    Mild intermittent asthma without complication    PCOS (polycystic ovarian syndrome)    Depression    Class 1 obesity with body mass index (BMI) of 34.0 to 34.9 in adult    Acute appendicitis with localized peritonitis, without perforation, abscess, or gangrene    Moderate persistent asthma without complication       PMH:  Past Medical History:   Diagnosis Date    Asthma     Bipolar and related disorder (HCC)     Eczema     Schizo affective schizophrenia (HCC)     provided by friends/family     Procedure(s):  APPENDECTOMY LAPAROSCOPIC    Surgeon(s):  GIULIA Patel MD    [facility]  Past Medical History:   Diagnosis Date    Asthma     Bipolar and related disorder (HCC)     Eczema     Schizo affective schizophrenia (HCC)     provided by friends/family     Anesthesia:  General    Assistant:  Mr. Yury To PA-C  Services of GIULIA was utilized as a first assistant as there is no surgical residency program at [facility]    Staff:   Circulator: Maia Grigsby RN; Manda Richter RN; Zuleika Pinedo RN  Relief Circulator: Magdiel Manzanares RN  Scrub Person: Millicent Proctor RN    Operative Findings:  Acutely inflamed retrocecal appendix      Procedure:  Mira Jameson was identified by me. Proper consent was obtained. The risks, benefits, alternatives,and probabilities of success were discussed in detail with no guarantee made  as to outcome. All questions were answered to the patient's satisfaction.Site was marked.Prior coming to OR  Area was cleansed with ChloraPrep and draped in usual sterile fashion. Mira Jameson was given pre-operative antibiotics. Body mass index is 32.96 kg/m². Mira Jameson is ASA 2E and Fire risk- 3. Mira Jameson was re-identified in the OR. Site was identified and detailed timeout was performed with Anesthesia and OR staff.     Mira Jameson was given IV  antibiotics.  Venodynes were placed, and Bhatti catheter was placed. After painting, draping, and time-out, infraumbilical transverse curvilinear incision was made. The skin and subcutaneous tissue was cut along the line of incision with open Kayla technique. A 10 mm port was placed. Pneumoperitoneum was created with the pressure marked up to 15 mmHg and maintained during the procedure with high flow carbon dioxideOther two ports were placed, one into the left lower quadrant in midclavicular line and one suprapubic under direct telescopic vision. The appendix was curved and there were multiple adhesions and inflamed appendix was enlarged and retrocecal these adhesions which were carefully dissected with the harmonic scalpel, mesoappendix was  cauterized and cut with the Harmonic scalpel and the base of the appendix was stapled and cut with a cuff of cecum with ECHELON FLEX 60 Powered plus Stapler then placed into Endo pouch and was delivered through the umbilical incision. Abdominal cavity was thoroughly lavaged with copious amounts of fluid for irrigation mixed with antibiotics particularly pelvis and right paracolic gutter and subhepatic space and subdiaphragmatic space were lavaged Base of appendix sites was re inspected staple line was intact and no active bleeding was noted.     The 10 mm port site was closed in two layers with the figure-of-eight Vicryl, and the skin was approximated with subcuticular Monocryl 4-0. All the 5 mm ports were closed in single  layer subcuticular Monocryl. All the ports were thoroughly infiltrated with Marcaine with epinephrine.    Mira Jameson tolerated the procedure well, remain stable during and after the procedure. At the end of the procedure all the instruments, needle and sponge counts were noted to be correct. Sterile dresing was applied and patient was transfered to recovery area in stable condition.     I was present for the entire procedure No qualified resident was available.     A physician's assistant was required due to the intensity of the surgery.     PACs assistant was required for camera operation, hemostasis retraction and the closure of the surgical wound.    Physician assistant was present during the entire procedure.    Patient Disposition:  PACU     Estimated Blood Loss:  Minimal    Specimens:  Order Name Source Comment Collection Info Order Time   TISSUE EXAM Appendix  Collected By: Deonte Hart MD 1/7/2025  3:26 PM     Release to patient through Mychart   Immediate              Drains:  Urethral Catheter Latex 16 Fr. (Active)       Complications:  None    Total OR Time  * Missing case tracking time(s) * .or      Deonte Hart MD MS FRCS [facility]  Date: 1/7/2025  Time: 3:45 PM  Copy to PCP: No primary care provider on file.

## 2025-01-07 NOTE — DISCHARGE INSTR - AVS FIRST PAGE
Postoperative Care Instructions  Laparoscopic Appendectomy    1. General: You may feel pulling sensations around the wound or funny aches and pains around the incisions. This is normal. Even minor surgery is a change in your body and this is your body's reaction to it. If you have had abdominal surgery, it may help to support the incision with a small pillow or blanket for comfort when moving or coughing.    2. Wound care:  The glue over the incisions will fall off over the next week or two. If you have staples or stitches, they will be removed by the physician at your follow up appointment.    3. Showering: You may shower again 48 hours after surgery. Please do not soak wound in standing water such as a bath, hot tub, pool, lake, etc. Do not scrub or use exfoliants on the surgical wounds.    4. Activity: You may go up and down stairs, walk as much as you are comfortable, but walk at least 3 times each day. If you have had abdominal surgery, do not perform any strenuous exercise or lift anything heavier than 15-20 pounds for at least 4 weeks, unless cleared by your physician.    5. Diet: You may resume your regular diet. Please drink lots of water.    6. Medications: Resume all of your previous medications, unless told otherwise by the doctor.  A good option for pain control is to start with acetaminophen(Tylenol) 650mg and ibuprofen(Advil) 400-600mg and alternate taking them every 3 hours.  If this is not sufficient then you make take the narcotic pain medicine as prescribed. You do not need to take the narcotic pain medication unless you are having significant pain and discomfort. Please take the narcotic medication with food. Insure that you do not take more than 4000 mg of Tylenol per day.     7. Driving: You will need someone to drive you home on the day of surgery. Do not drive or make any important decisions while on narcotic pain medication. Generally, you may drive 48 hours after you've stopped taking all  narcotic pain medications.    8. Upset Stomach: You may take Maalox, Tums, or similar items for an upset stomach. If your narcotic pain medication causes an upset stomach, do not take it on an empty stomach. Try taking it with at least some crackers or toast.     9. Constipation: Patients often experience constipation after surgery, especially if taking narcotic pain medications. We recommend starting an over-the-counter medication for this, such as Metamucil, Senokot, Colace, milk of magnesia, etc. You may stop taking these medications a couple days after your last dose of narcotic medication. If you experience significant nausea or vomiting after abdominal surgery, call the office before trying any of these medications.     10. Call the office: If you are experiencing any of the following: fevers above 101.5°, significant nausea or vomiting, if the wound develops drainage and/or excessive redness around the wound, or if you have significant diarrhea or other worsening symptoms.    11. Pain: A prescription for narcotic pain medication will be sent to your pharmacy upon discharge from the hospital. Please only take this medication if absolutely necessary. Please return extra narcotics to your local pharmacy.

## 2025-01-07 NOTE — ASSESSMENT & PLAN NOTE
No recent PFTs, exacerbation approximately 2 weeks, not on any long-acting agent, uses rescue inhaler approximately every 2 weeks, not following pulmonology    -Continue with albuterol as needed  -Follow-up with pulmonology and PFTs as an outpatient

## 2025-01-07 NOTE — PROGRESS NOTES
Patient alert and awake, dressings clean, dry and intact, vital signs WNL. Patient transferred to room 214 via stretcher. Bedside report given to HIMA Calderon. Side rails on bed up, call bell within reach, bed alarm on.

## 2025-01-07 NOTE — Clinical Note
Case was discussed with Yury To PA-C and the patient's admission status was agreed to be Admission Status: inpatient status to the service of Dr. Hart .

## 2025-01-08 VITALS
SYSTOLIC BLOOD PRESSURE: 127 MMHG | HEART RATE: 96 BPM | OXYGEN SATURATION: 96 % | TEMPERATURE: 99.2 F | BODY MASS INDEX: 32.78 KG/M2 | HEIGHT: 64 IN | RESPIRATION RATE: 18 BRPM | DIASTOLIC BLOOD PRESSURE: 72 MMHG | WEIGHT: 192 LBS

## 2025-01-08 LAB — BACTERIA UR CULT: NORMAL

## 2025-01-08 PROCEDURE — 99024 POSTOP FOLLOW-UP VISIT: CPT | Performed by: PHYSICIAN ASSISTANT

## 2025-01-08 RX ORDER — OXYCODONE HYDROCHLORIDE 5 MG/1
5 TABLET ORAL EVERY 6 HOURS PRN
Qty: 5 TABLET | Refills: 0 | Status: SHIPPED | OUTPATIENT
Start: 2025-01-08 | End: 2025-01-13

## 2025-01-08 RX ADMIN — CEFAZOLIN SODIUM 2000 MG: 2 SOLUTION INTRAVENOUS at 04:58

## 2025-01-08 RX ADMIN — PANTOPRAZOLE SODIUM 40 MG: 40 TABLET, DELAYED RELEASE ORAL at 09:02

## 2025-01-08 RX ADMIN — DOCUSATE SODIUM 100 MG: 100 CAPSULE, LIQUID FILLED ORAL at 09:02

## 2025-01-08 RX ADMIN — POLYETHYLENE GLYCOL 3350 17 G: 17 POWDER, FOR SOLUTION ORAL at 09:02

## 2025-01-08 NOTE — NURSING NOTE
[facility] home[facility]. IV removed per policy and procedure, occlusive dressing applied. AVS reviewed with pt, no questions at this time. Pt left with all belongings.

## 2025-01-08 NOTE — PROGRESS NOTES
Progress Note - Surgery-General   Name: Mira Jameson 24 y.o. female I MRN: [id number]  Unit/Bed#: 2 [address] Date of Admission: 1/7/2025   Date of Service: 1/8/2025 I Hospital Day: 1    Assessment & Plan  Acute appendicitis with localized peritonitis, without perforation, abscess, or gangrene  POD#1 s/p laparoscopic appendectomy    -regular diet   -As needed analgesia and antiemetic  -Bowel regimen  -Postoperative care instructions discussed with patient in detail at bedside  -discharge home today       Moderate persistent asthma without complication  No recent PFTs, exacerbation approximately 2 weeks, not on any long-acting agent, uses rescue inhaler approximately every 2 weeks, not following pulmonology    -Continue with albuterol as needed  -Follow-up with pulmonology and PFTs as an outpatient  Class 1 obesity with body mass index (BMI) of 34.0 to 34.9 in adult      Ok for discharge from Surgery-General service perspective.    Subjective   Patient reports her pain is managed well without many narcotic medications. She is tolerating oral intake. She is ready to go home today     Objective :  Temp:  [97.2 °F (36.2 °C)-99.2 °F (37.3 °C)] 99.2 °F (37.3 °C)  HR:  [] 96  BP: (112-134)/(61-88) 127/72  Resp:  [16-18] 18  SpO2:  [94 %-100 %] 96 %  O2 Device: None (Room air)    I/O         01/06 0701  01/07 0700 01/07 0701  01/08 0700 01/08 0701  01/09 0700    I.V. (mL/kg)  800 (9.2)     IV Piggyback  100     Total Intake(mL/kg)  900 (10.3)     Urine (mL/kg/hr)  100 (0)     Total Output  100     Net  +800                    Physical Exam  Vitals reviewed.   Constitutional:       General: She is awake. She is not in acute distress.     Appearance: Normal appearance. She is well-developed, well-groomed and overweight. She is not ill-appearing or toxic-appearing.      Interventions: She is not intubated.  HENT:      Head: Normocephalic.   Eyes:      General: No scleral icterus.     Conjunctiva/sclera: Conjunctivae  normal.      Right eye: Right conjunctiva is not injected.      Left eye: Left conjunctiva is not injected.   Cardiovascular:      Rate and Rhythm: Normal rate and regular rhythm.   Pulmonary:      Effort: Pulmonary effort is normal. No tachypnea or bradypnea. She is not intubated.   Abdominal:      General: There is no distension.      Palpations: Abdomen is soft.      Tenderness: There is no guarding.      Comments: Laparoscopic incisions clean dry and intact with gauze and Tegaderm   Skin:     General: Skin is warm.      Coloration: Skin is not cyanotic or jaundiced.   Neurological:      General: No focal deficit present.      Mental Status: She is alert.      GCS: GCS eye subscore is 4. GCS verbal subscore is 5. GCS motor subscore is 6.   Psychiatric:         Attention and Perception: Attention normal.         Mood and Affect: Mood normal.         Speech: Speech normal.         Behavior: Behavior normal. Behavior is cooperative.           Lab Results: I have reviewed the following results:  Recent Labs     01/07/25  0810   WBC 8.67   HGB 11.8   HCT 37.6      SODIUM 133*   K 3.7      CO2 26   BUN 12   CREATININE 0.88   GLUC 101   AST 15   ALT 14   ALB 4.3   TBILI 0.29   ALKPHOS 69       Imaging Results Review: No pertinent imaging studies reviewed.  Other Study Results Review: No additional pertinent studies reviewed.    VTE Pharmacologic Prophylaxis: Enoxaparin (Lovenox) SQ  VTE Mechanical Prophylaxis: sequential compression device

## 2025-01-08 NOTE — PLAN OF CARE
Problem: PAIN - ADULT  Goal: Verbalizes/displays adequate comfort level or baseline comfort level  Description: Interventions:  - Encourage patient to monitor pain and request assistance  - Assess pain using appropriate pain scale  - Administer analgesics based on type and severity of pain and evaluate response  - Implement non-pharmacological measures as appropriate and evaluate response  - Consider cultural and social influences on pain and pain management  - Notify physician/advanced practitioner if interventions unsuccessful or patient reports new pain  Outcome: Progressing     Problem: INFECTION - ADULT  Goal: Absence or prevention of progression during hospitalization  Description: INTERVENTIONS:  - Assess and monitor for signs and symptoms of infection  - Monitor lab/diagnostic results  - Monitor all insertion sites, i.e. indwelling lines, tubes, and drains  - Monitor endotracheal if appropriate and nasal secretions for changes in amount and color  - [place] appropriate cooling/warming therapies per order  - Administer medications as ordered  - Instruct and encourage patient and family to use good hand hygiene technique  - Identify and instruct in appropriate isolation precautions for identified infection/condition  Outcome: Progressing     Problem: DISCHARGE PLANNING  Goal: Discharge to home or other facility with appropriate resources  Description: INTERVENTIONS:  - Identify barriers to discharge w/patient and caregiver  - Arrange for needed discharge resources and transportation as appropriate  - Identify discharge learning needs (meds, wound care, etc.)  - Arrange for interpretive services to assist at discharge as needed  - Refer to Case Management Department for coordinating discharge planning if the patient needs post-hospital services based on physician/advanced practitioner order or complex needs related to functional status, cognitive ability, or social support system  Outcome: Progressing      Problem: GASTROINTESTINAL - ADULT  Goal: Minimal or absence of nausea and/or vomiting  Description: INTERVENTIONS:  - Administer IV fluids if ordered to ensure adequate hydration  - Maintain NPO status until nausea and vomiting are resolved  - Nasogastric tube if ordered  - Administer ordered antiemetic medications as needed  - Provide nonpharmacologic comfort measures as appropriate  - Advance diet as tolerated, if ordered  - Consider nutrition services referral to assist patient with adequate nutrition and appropriate food choices  Outcome: Progressing  Goal: Maintains or returns to baseline bowel function  Description: INTERVENTIONS:  - Assess bowel function  - Encourage oral fluids to ensure adequate hydration  - Administer IV fluids if ordered to ensure adequate hydration  - Administer ordered medications as needed  - Encourage mobilization and activity  - Consider nutritional services referral to assist patient with adequate nutrition and appropriate food choices  Outcome: Progressing

## 2025-01-08 NOTE — ASSESSMENT & PLAN NOTE
POD#1 s/p laparoscopic appendectomy    -regular diet   -As needed analgesia and antiemetic  -Bowel regimen  -Postoperative care instructions discussed with patient in detail at bedside  -discharge home today

## 2025-01-09 ENCOUNTER — APPOINTMENT (OUTPATIENT)
Dept: LAB | Facility: CLINIC | Age: 25
End: 2025-01-09
Payer: COMMERCIAL

## 2025-01-09 ENCOUNTER — TELEPHONE (OUTPATIENT)
Dept: SURGERY | Facility: CLINIC | Age: 25
End: 2025-01-09

## 2025-01-09 DIAGNOSIS — E28.2 PCOS (POLYCYSTIC OVARIAN SYNDROME): ICD-10-CM

## 2025-01-09 DIAGNOSIS — E66.811 CLASS 1 OBESITY WITH BODY MASS INDEX (BMI) OF 34.0 TO 34.9 IN ADULT, UNSPECIFIED OBESITY TYPE, UNSPECIFIED WHETHER SERIOUS COMORBIDITY PRESENT: ICD-10-CM

## 2025-01-09 DIAGNOSIS — Z11.4 SCREENING FOR HIV (HUMAN IMMUNODEFICIENCY VIRUS): ICD-10-CM

## 2025-01-09 DIAGNOSIS — Z11.59 NEED FOR HEPATITIS C SCREENING TEST: ICD-10-CM

## 2025-01-09 LAB
ALBUMIN SERPL BCG-MCNC: 4.4 G/DL (ref 3.5–5)
ALP SERPL-CCNC: 69 U/L (ref 34–104)
ALT SERPL W P-5'-P-CCNC: 11 U/L (ref 7–52)
ANION GAP SERPL CALCULATED.3IONS-SCNC: 7 MMOL/L (ref 4–13)
AST SERPL W P-5'-P-CCNC: 14 U/L (ref 13–39)
BILIRUB SERPL-MCNC: 0.39 MG/DL (ref 0.2–1)
BUN SERPL-MCNC: 13 MG/DL (ref 5–25)
CALCIUM SERPL-MCNC: 9.5 MG/DL (ref 8.4–10.2)
CHLORIDE SERPL-SCNC: 100 MMOL/L (ref 96–108)
CHOLEST SERPL-MCNC: 125 MG/DL (ref ?–200)
CO2 SERPL-SCNC: 28 MMOL/L (ref 21–32)
CREAT SERPL-MCNC: 0.77 MG/DL (ref 0.6–1.3)
GFR SERPL CREATININE-BSD FRML MDRD: 109 ML/MIN/1.73SQ M
GLUCOSE P FAST SERPL-MCNC: 85 MG/DL (ref 65–99)
HCG SERPL QL: NEGATIVE
HDLC SERPL-MCNC: 48 MG/DL
LDLC SERPL CALC-MCNC: 48 MG/DL (ref 0–100)
NONHDLC SERPL-MCNC: 77 MG/DL
POTASSIUM SERPL-SCNC: 4.1 MMOL/L (ref 3.5–5.3)
PROLACTIN SERPL-MCNC: 18.72 NG/ML (ref 3.34–26.72)
PROT SERPL-MCNC: 7.4 G/DL (ref 6.4–8.4)
SODIUM SERPL-SCNC: 135 MMOL/L (ref 135–147)
TRIGL SERPL-MCNC: 147 MG/DL (ref ?–150)
TSH SERPL DL<=0.05 MIU/L-ACNC: 2.18 UIU/ML (ref 0.45–4.5)

## 2025-01-09 PROCEDURE — 84703 CHORIONIC GONADOTROPIN ASSAY: CPT

## 2025-01-09 PROCEDURE — 80053 COMPREHEN METABOLIC PANEL: CPT

## 2025-01-09 PROCEDURE — 83498 ASY HYDROXYPROGESTERONE 17-D: CPT

## 2025-01-09 PROCEDURE — 87389 HIV-1 AG W/HIV-1&-2 AB AG IA: CPT

## 2025-01-09 PROCEDURE — 84146 ASSAY OF PROLACTIN: CPT

## 2025-01-09 PROCEDURE — 80061 LIPID PANEL: CPT

## 2025-01-09 PROCEDURE — 83036 HEMOGLOBIN GLYCOSYLATED A1C: CPT

## 2025-01-09 PROCEDURE — 36415 COLL VENOUS BLD VENIPUNCTURE: CPT

## 2025-01-09 PROCEDURE — 86803 HEPATITIS C AB TEST: CPT

## 2025-01-09 PROCEDURE — 84443 ASSAY THYROID STIM HORMONE: CPT

## 2025-01-09 NOTE — UTILIZATION REVIEW
EMERGENT OUTPATIENT PROCEDURE AUTHORIZATION REQUEST   REQUESTING/SERVICING FACILITY:   [place] [address]  Tax ID: [id number]  NPI: [id number]  Phone: [employer] ([phone]) ATTENDING PROVIDER:  Attending Name and NPI#: Deonte Hart Md [[id number]]  Address: [address] [place] [address]  Phone: [employer] ([phone])   STAY INFORMATION:  Place of Service: [facility]  Place of Service Code: 22  Patient presented to the ER, had an outpatient procedure and discharge.   No admission orders.     OUTPATIENT PROCEDURE INFORMATION    Procedure Date: 1/7/2025  Discharge Date/Time: 1/8/2025 10:50 AM  Patient Preop Diagnosis: Appendicitis [K37] Post-Op Diagnosis Codes:     * Appendicitis [K37]  Outpatient Procedure CPT Codes:30176    **OPERATIVE REPORT ATTACHED**     UTILIZATION REVIEW CONTACT:  Pricila Desai, Utilization   Network Utilization Review Department  Phone: [phone]  Fax: [phone]  Email: [email or web address]  Contact for approvals/pending authorizations, clinical reviews, and discharge.

## 2025-01-10 ENCOUNTER — RESULTS FOLLOW-UP (OUTPATIENT)
Age: 25
End: 2025-01-10

## 2025-01-10 DIAGNOSIS — K59.01 SLOW TRANSIT CONSTIPATION: Primary | ICD-10-CM

## 2025-01-10 LAB
EST. AVERAGE GLUCOSE BLD GHB EST-MCNC: 128 MG/DL
HBA1C MFR BLD: 6.1 %
HCV AB SER QL: NORMAL
HIV 1+2 AB+HIV1 P24 AG SERPL QL IA: NORMAL
HIV 2 AB SERPL QL IA: NORMAL
HIV1 AB SERPL QL IA: NORMAL
HIV1 P24 AG SERPL QL IA: NORMAL

## 2025-01-10 PROCEDURE — 88304 TISSUE EXAM BY PATHOLOGIST: CPT | Performed by: STUDENT IN AN ORGANIZED HEALTH CARE EDUCATION/TRAINING PROGRAM

## 2025-01-10 RX ORDER — POLYETHYLENE GLYCOL 3350 17 G/17G
17 POWDER, FOR SOLUTION ORAL DAILY
Qty: 238 G | Refills: 0 | Status: SHIPPED | OUTPATIENT
Start: 2025-01-10

## 2025-01-13 LAB — 17OHP SERPL-MCNC: 46 NG/DL

## 2025-01-14 ENCOUNTER — TELEPHONE (OUTPATIENT)
Dept: SURGERY | Facility: CLINIC | Age: 25
End: 2025-01-14

## 2025-01-19 DIAGNOSIS — Z00.6 ENCOUNTER FOR EXAMINATION FOR NORMAL COMPARISON OR CONTROL IN CLINICAL RESEARCH PROGRAM: ICD-10-CM

## 2025-01-20 ENCOUNTER — HOSPITAL ENCOUNTER (EMERGENCY)
Facility: HOSPITAL | Age: 25
Discharge: HOME | End: 2025-01-20
Attending: EMERGENCY MEDICINE
Payer: COMMERCIAL

## 2025-01-20 ENCOUNTER — NURSE TRIAGE (OUTPATIENT)
Age: 25
End: 2025-01-20

## 2025-01-20 ENCOUNTER — APPOINTMENT (EMERGENCY)
Dept: RADIOLOGY | Facility: HOSPITAL | Age: 25
End: 2025-01-20
Payer: COMMERCIAL

## 2025-01-20 ENCOUNTER — OFFICE VISIT (OUTPATIENT)
Age: 25
End: 2025-01-20

## 2025-01-20 VITALS
OXYGEN SATURATION: 98 % | DIASTOLIC BLOOD PRESSURE: 77 MMHG | HEART RATE: 76 BPM | TEMPERATURE: 98.4 F | SYSTOLIC BLOOD PRESSURE: 115 MMHG | RESPIRATION RATE: 20 BRPM

## 2025-01-20 VITALS
OXYGEN SATURATION: 99 % | DIASTOLIC BLOOD PRESSURE: 77 MMHG | TEMPERATURE: 97 F | RESPIRATION RATE: 18 BRPM | HEIGHT: 64 IN | SYSTOLIC BLOOD PRESSURE: 119 MMHG | BODY MASS INDEX: 32.78 KG/M2 | HEART RATE: 78 BPM | WEIGHT: 192 LBS

## 2025-01-20 DIAGNOSIS — M54.50 ACUTE RIGHT-SIDED LOW BACK PAIN WITHOUT SCIATICA: Primary | ICD-10-CM

## 2025-01-20 DIAGNOSIS — R10.9 RIGHT FLANK PAIN: Primary | ICD-10-CM

## 2025-01-20 LAB
ALBUMIN SERPL BCG-MCNC: 4.5 G/DL (ref 3.5–5)
ALP SERPL-CCNC: 61 U/L (ref 34–104)
ALT SERPL W P-5'-P-CCNC: 15 U/L (ref 7–52)
ANION GAP SERPL CALCULATED.3IONS-SCNC: 5 MMOL/L (ref 4–13)
AST SERPL W P-5'-P-CCNC: 18 U/L (ref 13–39)
BASOPHILS # BLD AUTO: 0.02 THOUSANDS/ΜL (ref 0–0.1)
BASOPHILS NFR BLD AUTO: 0 % (ref 0–1)
BILIRUB SERPL-MCNC: 0.42 MG/DL (ref 0.2–1)
BILIRUB UR QL STRIP: NEGATIVE
BUN SERPL-MCNC: 11 MG/DL (ref 5–25)
CALCIUM SERPL-MCNC: 9.6 MG/DL (ref 8.4–10.2)
CHLORIDE SERPL-SCNC: 104 MMOL/L (ref 96–108)
CLARITY UR: CLEAR
CO2 SERPL-SCNC: 25 MMOL/L (ref 21–32)
COLOR UR: COLORLESS
CREAT SERPL-MCNC: 0.75 MG/DL (ref 0.6–1.3)
EOSINOPHIL # BLD AUTO: 0.08 THOUSAND/ΜL (ref 0–0.61)
EOSINOPHIL NFR BLD AUTO: 2 % (ref 0–6)
ERYTHROCYTE [DISTWIDTH] IN BLOOD BY AUTOMATED COUNT: 13.6 % (ref 11.6–15.1)
EXT PREGNANCY TEST URINE: NEGATIVE
EXT. CONTROL: NORMAL
GFR SERPL CREATININE-BSD FRML MDRD: 113 ML/MIN/1.73SQ M
GLUCOSE SERPL-MCNC: 86 MG/DL (ref 65–140)
GLUCOSE UR STRIP-MCNC: NEGATIVE MG/DL
HCT VFR BLD AUTO: 38.6 % (ref 34.8–46.1)
HGB BLD-MCNC: 12.2 G/DL (ref 11.5–15.4)
HGB UR QL STRIP.AUTO: NEGATIVE
IMM GRANULOCYTES # BLD AUTO: 0.01 THOUSAND/UL (ref 0–0.2)
IMM GRANULOCYTES NFR BLD AUTO: 0 % (ref 0–2)
KETONES UR STRIP-MCNC: NEGATIVE MG/DL
LEUKOCYTE ESTERASE UR QL STRIP: NEGATIVE
LIPASE SERPL-CCNC: 18 U/L (ref 11–82)
LYMPHOCYTES # BLD AUTO: 2.33 THOUSANDS/ΜL (ref 0.6–4.47)
LYMPHOCYTES NFR BLD AUTO: 44 % (ref 14–44)
MCH RBC QN AUTO: 28.5 PG (ref 26.8–34.3)
MCHC RBC AUTO-ENTMCNC: 31.6 G/DL (ref 31.4–37.4)
MCV RBC AUTO: 90 FL (ref 82–98)
MONOCYTES # BLD AUTO: 0.3 THOUSAND/ΜL (ref 0.17–1.22)
MONOCYTES NFR BLD AUTO: 6 % (ref 4–12)
NEUTROPHILS # BLD AUTO: 2.6 THOUSANDS/ΜL (ref 1.85–7.62)
NEUTS SEG NFR BLD AUTO: 48 % (ref 43–75)
NITRITE UR QL STRIP: NEGATIVE
NRBC BLD AUTO-RTO: 0 /100 WBCS
PH UR STRIP.AUTO: 5.5 [PH]
PLATELET # BLD AUTO: 338 THOUSANDS/UL (ref 149–390)
PMV BLD AUTO: 9.4 FL (ref 8.9–12.7)
POTASSIUM SERPL-SCNC: 4.4 MMOL/L (ref 3.5–5.3)
PROT SERPL-MCNC: 7.4 G/DL (ref 6.4–8.4)
PROT UR STRIP-MCNC: NEGATIVE MG/DL
RBC # BLD AUTO: 4.28 MILLION/UL (ref 3.81–5.12)
SL AMB  POCT GLUCOSE, UA: NORMAL
SL AMB LEUKOCYTE ESTERASE,UA: NORMAL
SL AMB POCT BILIRUBIN,UA: NORMAL
SL AMB POCT BLOOD,UA: 10
SL AMB POCT CLARITY,UA: CLEAR
SL AMB POCT COLOR,UA: YELLOW
SL AMB POCT KETONES,UA: NORMAL
SL AMB POCT NITRITE,UA: NORMAL
SL AMB POCT PH,UA: 5.5
SL AMB POCT SPECIFIC GRAVITY,UA: 1.01
SL AMB POCT URINE PROTEIN: NORMAL
SL AMB POCT UROBILINOGEN: 0.2
SODIUM SERPL-SCNC: 134 MMOL/L (ref 135–147)
SP GR UR STRIP.AUTO: 1.02 (ref 1–1.03)
UROBILINOGEN UR STRIP-ACNC: <2 MG/DL
WBC # BLD AUTO: 5.34 THOUSAND/UL (ref 4.31–10.16)

## 2025-01-20 PROCEDURE — 36415 COLL VENOUS BLD VENIPUNCTURE: CPT | Performed by: PHYSICIAN ASSISTANT

## 2025-01-20 PROCEDURE — 99284 EMERGENCY DEPT VISIT MOD MDM: CPT

## 2025-01-20 PROCEDURE — 99213 OFFICE O/P EST LOW 20 MIN: CPT | Performed by: FAMILY MEDICINE

## 2025-01-20 PROCEDURE — 96360 HYDRATION IV INFUSION INIT: CPT

## 2025-01-20 PROCEDURE — 96361 HYDRATE IV INFUSION ADD-ON: CPT

## 2025-01-20 PROCEDURE — 99284 EMERGENCY DEPT VISIT MOD MDM: CPT | Performed by: PHYSICIAN ASSISTANT

## 2025-01-20 PROCEDURE — 83690 ASSAY OF LIPASE: CPT | Performed by: PHYSICIAN ASSISTANT

## 2025-01-20 PROCEDURE — 81002 URINALYSIS NONAUTO W/O SCOPE: CPT | Performed by: FAMILY MEDICINE

## 2025-01-20 PROCEDURE — 85025 COMPLETE CBC W/AUTO DIFF WBC: CPT | Performed by: PHYSICIAN ASSISTANT

## 2025-01-20 PROCEDURE — 81003 URINALYSIS AUTO W/O SCOPE: CPT | Performed by: PHYSICIAN ASSISTANT

## 2025-01-20 PROCEDURE — 81025 URINE PREGNANCY TEST: CPT | Performed by: PHYSICIAN ASSISTANT

## 2025-01-20 PROCEDURE — 74177 CT ABD & PELVIS W/CONTRAST: CPT

## 2025-01-20 PROCEDURE — 80053 COMPREHEN METABOLIC PANEL: CPT | Performed by: PHYSICIAN ASSISTANT

## 2025-01-20 RX ADMIN — IOHEXOL 100 ML: 350 INJECTION, SOLUTION INTRAVENOUS at 15:34

## 2025-01-20 RX ADMIN — SODIUM CHLORIDE 1000 ML: 0.9 INJECTION, SOLUTION INTRAVENOUS at 15:05

## 2025-01-20 NOTE — ED PROVIDER NOTES
Time reflects when diagnosis was documented in both MDM as applicable and the Disposition within this note       Time User Action Codes Description Comment    1/20/2025  5:02 PM Duane Haddad Add [R10.9] Right flank pain           ED Disposition       ED Disposition   Discharge    Condition   Stable    Date/Time   Mon Jan 20, 2025  5:02 PM    Comment   Mira Gisell discharge to home/self care.                   Assessment & Plan       Medical Decision Making  Differential diagnosis includes postop complication such as abscess, ureteral stone, pancreatitis, musculoskeletal back pain.  Labs reviewed and unremarkable other than slight hyponatremia with sodium before.  CT abdomen pelvis is unremarkable.  I secure chatted [place] Dr. Laz Benoit to apprise of results in the ED  Pt advised she may take tylenol every 6 hours as needed for pain  Return precautions reviewed.     Amount and/or Complexity of Data Reviewed  Labs: ordered.  Radiology: ordered.    Risk  Prescription drug management.             Medications   sodium chloride 0.9 % bolus 1,000 mL (1,000 mL Intravenous New Bag 1/20/25 1505)   iohexol (OMNIPAQUE) 350 MG/ML injection (MULTI-DOSE) 100 mL (100 mL Intravenous Given 1/20/25 1534)       ED Risk Strat Scores                          SBIRT 22yo+      Flowsheet Row Most Recent Value   Initial Alcohol Screen: US AUDIT-C     1. How often do you have a drink containing alcohol? 0 Filed at: 01/20/2025 1403   2. How many drinks containing alcohol do you have on a typical day you are drinking?  0 Filed at: 01/20/2025 1403   3a. Male UNDER 65: How often do you have five or more drinks on one occasion? 0 Filed at: 01/20/2025 1403   3b. FEMALE Any Age, or MALE 65+: How often do you have 4 or more drinks on one occassion? 0 Filed at: 01/20/2025 1403   Audit-C Score 0 Filed at: 01/20/2025 1403   CRYSTAL: How many times in the past year have you...    Used an illegal drug or used a prescription medication for  non-medical reasons? Never Filed at: 01/20/2025 0442                            History of Present Illness       Chief Complaint   Patient presents with    Flank Pain     Appendix removed 1/7, now having right side flank pain since Friday with fevers. States she has urine tested at [place] and nothing found       Past Medical History:   Diagnosis Date    Asthma     Bipolar and related disorder (HCC)     Eczema     Schizo affective schizophrenia (HCC)     provided by friends/family      Past Surgical History:   Procedure Laterality Date    APPENDECTOMY      APPENDECTOMY LAPAROSCOPIC N/A 01/07/2025    Procedure: APPENDECTOMY LAPAROSCOPIC;  Surgeon: Deonte Hart MD;  Location: [facility] OR;  Service: General      Family History   Adopted: Yes   Problem Relation Age of Onset    Diabetes Mother     Bipolar disorder Mother     Schizoaffective Disorder  Mother       Social History     Tobacco Use    Smoking status: Former     Current packs/day: 0.25     Types: Cigarettes    Smokeless tobacco: Never   Vaping Use    Vaping status: Former    Substances: Nicotine, Flavoring   Substance Use Topics    Alcohol use: Not Currently    Drug use: Not Currently     Comment: former marijuana use      E-Cigarette/Vaping    E-Cigarette Use Former User       E-Cigarette/Vaping Substances    Nicotine Yes     THC No     CBD No     Flavoring Yes     Other No     Unknown No       I have reviewed and agree with the history as documented.     Patient is a 25 yo female with history of asthma, bipolar, 13 days s/p appendectomy who is sent in after being seen by [facility]. Patient reports onset 3 days ago of R lower back/flank pain. Pain worse when lying on R side and with twisting/bending. Tried muscle relaxer, tylenol, motrin without relief. Pain increased last night. Called Dr Hart office today; advised to contact PCP office to check urine. Reports she had fever 101 yesterday. No abd pain. No nausea/vomiting/diarrhea. Has been  taking miralax for constipation. No urinary symptoms         Review of Systems   Constitutional:  Negative for chills and fever.   Respiratory:  Negative for shortness of breath.    Cardiovascular:  Negative for chest pain.   Gastrointestinal:  Negative for abdominal pain, diarrhea, nausea and vomiting.   Genitourinary:  Positive for flank pain. Negative for dysuria and frequency.   Musculoskeletal:  Positive for back pain.           Objective       ED Triage Vitals   Temperature Pulse Blood Pressure Respirations SpO2 Patient Position - Orthostatic VS   01/20/25 1403 01/20/25 1405 01/20/25 1405 01/20/25 1403 01/20/25 1405 --   (!) 97 °F (36.1 °C) 81 137/87 18 99 %       Temp src Heart Rate Source BP Location FiO2 (%) Pain Score    -- -- -- -- 01/20/25 1403        9      Vitals      Date and Time Temp Pulse SpO2 Resp BP Pain Score FACES Pain Rating User   01/20/25 1405 -- 81 99 % -- 137/87 -- -- TANMAY   01/20/25 1403 97 °F (36.1 °C) -- -- 18 -- 9 -- TANMAY            Physical Exam  Vitals and nursing note reviewed.   Constitutional:       General: She is not in acute distress.     Appearance: Normal appearance. She is not ill-appearing, toxic-appearing or diaphoretic.   HENT:      Head: Normocephalic and atraumatic.      Right Ear: Tympanic membrane, ear canal and external ear normal.      Left Ear: Tympanic membrane, ear canal and external ear normal.      Nose: Nose normal.      Mouth/Throat:      Mouth: Mucous membranes are moist.      Pharynx: Oropharynx is clear.   Eyes:      Extraocular Movements: Extraocular movements intact.      Conjunctiva/sclera: Conjunctivae normal.      Pupils: Pupils are equal, round, and reactive to light.   Cardiovascular:      Rate and Rhythm: Normal rate and regular rhythm.      Pulses: Normal pulses.      Heart sounds: Normal heart sounds.   Pulmonary:      Effort: Pulmonary effort is normal.      Breath sounds: Normal breath sounds.   Abdominal:      General: Abdomen is flat. Bowel  sounds are normal. There is no distension.      Palpations: Abdomen is soft. There is no mass.      Tenderness: There is no abdominal tenderness. There is no right CVA tenderness, left CVA tenderness, guarding or rebound.      Hernia: No hernia is present.   Musculoskeletal:         General: Normal range of motion.      Cervical back: Normal range of motion and neck supple.   Skin:     General: Skin is warm and dry.      Capillary Refill: Capillary refill takes less than 2 seconds.   Neurological:      General: No focal deficit present.      Mental Status: She is alert and oriented to person, place, and time. Mental status is at baseline.         Results Reviewed       Procedure Component Value Units Date/Time    Comprehensive metabolic panel [[id number]]  (Abnormal) Collected: 01/20/25 1504    Lab Status: Final result Specimen: Blood from Arm, Left Updated: 01/20/25 1531     Sodium 134 mmol/L      Potassium 4.4 mmol/L      Chloride 104 mmol/L      CO2 25 mmol/L      ANION GAP 5 mmol/L      BUN 11 mg/dL      Creatinine 0.75 mg/dL      Glucose 86 mg/dL      Calcium 9.6 mg/dL      AST 18 U/L      ALT 15 U/L      Alkaline Phosphatase 61 U/L      Total Protein 7.4 g/dL      Albumin 4.5 g/dL      Total Bilirubin 0.42 mg/dL      eGFR 113 ml/min/1.73sq m     Narrative:      National Kidney Disease Foundation guidelines for Chronic Kidney Disease (CKD):     Stage 1 with normal or high GFR (GFR > 90 mL/min/1.73 square meters)    Stage 2 Mild CKD (GFR = 60-89 mL/min/1.73 square meters)    Stage 3A Moderate CKD (GFR = 45-59 mL/min/1.73 square meters)    Stage 3B Moderate CKD (GFR = 30-44 mL/min/1.73 square meters)    Stage 4 Severe CKD (GFR = 15-29 mL/min/1.73 square meters)    Stage 5 End Stage CKD (GFR <15 mL/min/1.73 square meters)  Note: GFR calculation is accurate only with a steady state creatinine    Lipase [[id number]]  (Normal) Collected: 01/20/25 1504    Lab Status: Final result Specimen: Blood from Arm, Left Updated:  01/20/25 1531     Lipase 18 u/L     CBC and differential [[id number]] Collected: 01/20/25 1504    Lab Status: Final result Specimen: Blood from Arm, Left Updated: 01/20/25 1512     WBC 5.34 Thousand/uL      RBC 4.28 Million/uL      Hemoglobin 12.2 g/dL      Hematocrit 38.6 %      MCV 90 fL      MCH 28.5 pg      MCHC 31.6 g/dL      RDW 13.6 %      MPV 9.4 fL      Platelets 338 Thousands/uL      nRBC 0 /100 WBCs      Segmented % 48 %      Immature Grans % 0 %      Lymphocytes % 44 %      Monocytes % 6 %      Eosinophils Relative 2 %      Basophils Relative 0 %      Absolute Neutrophils 2.60 Thousands/µL      Absolute Immature Grans 0.01 Thousand/uL      Absolute Lymphocytes 2.33 Thousands/µL      Absolute Monocytes 0.30 Thousand/µL      Eosinophils Absolute 0.08 Thousand/µL      Basophils Absolute 0.02 Thousands/µL     UA w Reflex to Microscopic w Reflex to Culture [[id number]] Collected: 01/20/25 1454    Lab Status: Final result Specimen: Urine, Clean Catch Updated: 01/20/25 1504     Color, UA Colorless     Clarity, UA Clear     Specific Gravity, UA 1.020     pH, UA 5.5     Leukocytes, UA Negative     Nitrite, UA Negative     Protein, UA Negative mg/dl      Glucose, UA Negative mg/dl      Ketones, UA Negative mg/dl      Urobilinogen, UA <2.0 mg/dl      Bilirubin, UA Negative     Occult Blood, UA Negative    POCT pregnancy, urine [[id number]]  (Normal) Collected: 01/20/25 1502    Lab Status: Final result Updated: 01/20/25 1502     EXT Preg Test, Ur Negative     Control Valid            CT abdomen pelvis with contrast   Final Interpretation by Maicol Orourke MD (01/20 1613)      No identifiable acute abnormality to account for the patient's clinical presentation.         Workstation performed: [id number]             Procedures    ED Medication and Procedure Management   Prior to Admission Medications   Prescriptions Last Dose Informant Patient Reported? Taking?   ARIPiprazole (ABILIFY) 15 mg tablet   Yes No   Sig:  Take 15 mg by mouth daily   Patient not taking: Reported on 12/30/2024   albuterol (ACCUNEB) 1.25 MG/3ML nebulizer solution   No No   Sig: Take 3 mL (1.25 mg total) by nebulization every 6 (six) hours as needed for wheezing or shortness of breath   albuterol (PROVENTIL HFA,VENTOLIN HFA) 90 mcg/act inhaler   No No   Sig: Inhale 2 puffs every 6 (six) hours as needed for wheezing   carbamide peroxide (DEBROX) 6.5 % otic solution   No No   Sig: Administer 5 drops into both ears 2 (two) times a day   methocarbamol (ROBAXIN) 500 mg tablet   No No   Sig: Take 1 tablet (500 mg total) by mouth 2 (two) times a day as needed for muscle spasms   naproxen (Naprosyn) 500 mg tablet   No No   Sig: Take 1 tablet (500 mg total) by mouth 2 (two) times a day with meals for 4 days   pantoprazole (PROTONIX) 40 mg tablet   No No   Sig: Take 1 tablet (40 mg total) by mouth daily   polyethylene glycol (MIRALAX) 17 g packet   No No   Sig: Take 17 g by mouth daily   psyllium (METAMUCIL) 58.6 % packet   Yes No   Sig: Take 1 packet by mouth daily   traZODone (DESYREL) 50 mg tablet   Yes No   Sig: Take 50 mg by mouth daily at bedtime   Patient not taking: Reported on 12/30/2024      Facility-Administered Medications: None     Patient's Medications   Discharge Prescriptions    No medications on file     No discharge procedures on file.  ED SEPSIS DOCUMENTATION   Time reflects when diagnosis was documented in both MDM as applicable and the Disposition within this note       Time User Action Codes Description Comment    1/20/2025  5:02 PM Duane Haddad Add [R10.9] Right flank pain                  Duane Haddad PA-C  01/20/25 9804

## 2025-01-20 NOTE — DISCHARGE INSTRUCTIONS
May take tylenol every 6 hours as needed for pain     Follow up with Dr Hart Wednesday as scheduled    Return to ED for increased pain, worsening symptoms

## 2025-01-20 NOTE — ASSESSMENT & PLAN NOTE
2 weeks s/p appendectomy complaining of 8/10 right lower back pain for a few days with a reported temp of 101 last night. No peritoneal sx and ROS neg otherwise. No urinary sx and dip was negative. Pain is reproducible on exam and can be MSK related as she picked up a large bag of ice recently. Due to her recent surgical hx and fever sent to ED for lab work up and an abdominal CT to rule out acute GI/ complications.     -Sent to ED for further evaluation   -If ED workup negative to continue supportive treatment with rest, heating pads or ice, and pain control with Tylenol alternating with Motrin.  -If still continued pain and discomfort outpatient surgical follow-up recommended    Orders:    POCT urine dip

## 2025-01-20 NOTE — TELEPHONE ENCOUNTER
Patient called in to schedule a post op appointment with Dr. Hart. Appointment scheduled. Patient stated she was feeling discomfort and had concerns. Warm transferred patient to [facility] for triage.

## 2025-01-20 NOTE — TELEPHONE ENCOUNTER
"Call transferred to this CTS to discuss concerns.   Pt of Dr. Hart s/p appendectomy 1/7/25-  Pt reports 3 days of right low back and flank pain described as pinching and throbbing especially with movement. Pt rates current pain 8/10, only with movement. No pain with sitting at rest.   Pt taking tylenol and ibuprofen for pain which improved pain slightly.   Pt reports temp 101 yesterday.   Pt states she has burning with urination x 2 days.   Pt states she is drinking increased amount of water and urine is clear.   Pt reports incisions appear clean, dry, healing well.   Advised pt to go to Urgent Care or PCP to rule out UTI based on UTI like symptoms.   Pt verbalized understanding and states she will reach out to PCP and if no openings today will go to care now.     Pt also reports constipation, LBM 2 days ago and asks recommendations. Educated pt on constipation protocol.   Constipation home care instructions provided to Pt:   - eat a high fiber diet (fruits, vegetables, grains)   - drink adequate fluids (6-8 cups daily, prune juice, avoid alcohol)   - exercise regularly (I.e. 15 min daily walk)   - get into a rhythm; try to have a BM at the same time each day   - don't ignore body's signals to have a BM  Constipation OTC medication education provided to Pt:   - STEP 1: Fiber laxative daily (I.e. Metamucil)   - STEP 2: Osmotic laxative (I.e. Miralax or Milk of Magnesia)   - STEP 3: Stimulate laxative if steps 1&2 do not relieve constipation (I.e. Dulcolax or Glycerin Suppository)   - STEP 4: Fleet enema    Pt thankful for the information, no other questions at this time.       Reason for Disposition   Caller has NON-URGENT question and triager unable to answer question    Answer Assessment - Initial Assessment Questions  1. SYMPTOM: \"What's the main symptom you're concerned about?\" (e.g., pain, fever, vomiting)      Pain   2. ONSET: \"When did pain  start?\"      3 days ago   3. SURGERY: \"What surgery did you " "have?\"      appendectomy  4. DATE of SURGERY: \"When was the surgery?\"       1/7/25  5. ANESTHESIA: \"What type of anesthesia did you have?\" (e.g., general, spinal, epidural, local)      General   6. DRAINS: \"Were any drains place in or around the wound?\" (e.g., Hemovac, Paul-Aguilar, Penrose)      none  7. PAIN: \"Is there any pain?\" If Yes, ask: \"How bad is it?\"  (Scale 1-10; or mild, moderate, severe)      8/10 with movement   8. FEVER: \"Do you have a fever?\" If Yes, ask: \"What is your temperature, how was it measured, and when did it start?\"      101 temp yesterday  9. VOMITING: \"Is there any vomiting?\" If Yes, ask: \"How many times?\"      denies  10. BLEEDING: \"Is there any bleeding?\" If Yes, ask: \"How much?\" and \"Where?\"        denies  11. OTHER SYMPTOMS: \"Do you have any other symptoms?\" (e.g., drainage from wound, painful urination, constipation)        Painful urination, constipation    Protocols used: Post-Op Symptoms and Questions-Adult-[place]    "

## 2025-01-20 NOTE — PROGRESS NOTES
Name: Mira Jameson      : 2000      MRN: [id number]  Encounter Provider: Laz Benoit MD  Encounter Date: 2025   Encounter department: [facility]  :  Assessment & Plan  Acute right-sided low back pain without sciatica  2 weeks s/p appendectomy complaining of 8/10 right lower back pain for a few days with a reported temp of 101 last night. No peritoneal sx and ROS neg otherwise. No urinary sx and dip was negative. Pain is reproducible on exam and can be MSK related as she picked up a large bag of ice recently. Due to her recent surgical hx and fever sent to ED for lab work up and an abdominal CT to rule out acute GI/ complications.     -Sent to ED for further evaluation   -If ED workup negative to continue supportive treatment with rest, heating pads or ice, and pain control with Tylenol alternating with Motrin.  -If still continued pain and discomfort outpatient surgical follow-up recommended    Orders:  •  POCT urine dip        BMI Counseling: There is no height or weight on file to calculate BMI. The BMI is above normal. Nutrition recommendations include decreasing portion sizes, encouraging healthy choices of fruits and vegetables, limiting drinks that contain sugar and increasing intake of lean protein. Exercise recommendations include moderate physical activity 150 minutes/week, exercising 3-5 times per week and strength training exercises. Rationale for BMI follow-up plan is due to patient being overweight or obese.     History of Present Illness   Pain (Started Friday night pinch/throbbing right flank, lower back pain. Pt states hurts more when laying on side at night, bending and twisting. Had lap appe on . Some issues with BM and constipation) Denies burning pain with urination. Denies any pain pressure in pelvic area or vagina. Hot compress helped. Temp of 101 last night. Drinking water and taking fiber. Pain is 8/10 not relived with tylenol.        Review of Systems   Constitutional:  Positive for fever (last night of 101). Negative for chills.   HENT:  Negative for ear pain and sore throat.    Eyes:  Negative for pain and visual disturbance.   Respiratory:  Negative for cough and shortness of breath.    Cardiovascular:  Negative for chest pain and palpitations.   Gastrointestinal:  Positive for abdominal pain (present from post op) and constipation. Negative for diarrhea, nausea and vomiting.   Genitourinary:  Negative for difficulty urinating, dysuria, hematuria, vaginal bleeding, vaginal discharge and vaginal pain.   Musculoskeletal:  Positive for back pain. Negative for arthralgias.   Skin:  Negative for color change and rash.   Neurological:  Negative for seizures and syncope.   Psychiatric/Behavioral:  Negative for agitation, behavioral problems and confusion.    All other systems reviewed and are negative.      Objective   /77 (BP Location: Left arm, Patient Position: Sitting, Cuff Size: Adult)   Pulse 76   Temp 98.4 °F (36.9 °C)   Resp 20   LMP  (LMP Unknown)   SpO2 98%      Physical Exam  Vitals and nursing note reviewed.   Constitutional:       General: She is not in acute distress.     Appearance: She is well-developed. She is obese. She is not toxic-appearing.   HENT:      Head: Normocephalic and atraumatic.      Right Ear: External ear normal.      Left Ear: External ear normal.      Nose: No congestion or rhinorrhea.      Mouth/Throat:      Pharynx: No oropharyngeal exudate or posterior oropharyngeal erythema.   Eyes:      General: No scleral icterus.        Right eye: No discharge.         Left eye: No discharge.      Extraocular Movements: Extraocular movements intact.      Conjunctiva/sclera: Conjunctivae normal.      Pupils: Pupils are equal, round, and reactive to light.   Cardiovascular:      Rate and Rhythm: Normal rate.      Pulses: Normal pulses.   Pulmonary:      Effort: Pulmonary effort is normal. No respiratory  distress.      Breath sounds: Normal breath sounds. No wheezing or rales.   Abdominal:      General: Abdomen is flat. Bowel sounds are normal. There is no distension.      Palpations: Abdomen is soft.      Tenderness: There is no abdominal tenderness. There is right CVA tenderness. There is no guarding.   Musculoskeletal:         General: No swelling or tenderness.      Cervical back: Neck supple. No rigidity or tenderness.        Back:       Comments: Tenderness reproducible on palpation   Skin:     General: Skin is warm and dry.      Capillary Refill: Capillary refill takes less than 2 seconds.   Neurological:      General: No focal deficit present.      Mental Status: She is alert and oriented to person, place, and time. Mental status is at baseline.      Cranial Nerves: No cranial nerve deficit.      Motor: No weakness.      Gait: Gait normal.   Psychiatric:         Mood and Affect: Mood normal.         Thought Content: Thought content normal.         Judgment: Judgment normal.

## 2025-01-22 ENCOUNTER — OFFICE VISIT (OUTPATIENT)
Age: 25
End: 2025-01-22

## 2025-01-22 VITALS — WEIGHT: 202 LBS | TEMPERATURE: 97.8 F | HEIGHT: 64 IN | BODY MASS INDEX: 34.49 KG/M2

## 2025-01-22 DIAGNOSIS — E66.811 CLASS 1 OBESITY WITH BODY MASS INDEX (BMI) OF 34.0 TO 34.9 IN ADULT: ICD-10-CM

## 2025-01-22 DIAGNOSIS — J45.40 MODERATE PERSISTENT ASTHMA WITHOUT COMPLICATION: ICD-10-CM

## 2025-01-22 DIAGNOSIS — Z09 SURGICAL FOLLOW-UP CARE: Primary | ICD-10-CM

## 2025-01-22 DIAGNOSIS — E28.2 PCOS (POLYCYSTIC OVARIAN SYNDROME): ICD-10-CM

## 2025-01-22 PROCEDURE — 99024 POSTOP FOLLOW-UP VISIT: CPT | Performed by: SPECIALIST

## 2025-01-22 NOTE — PROGRESS NOTES
" General Surgery Office Visit Follow up   [facility]  1. Surgical follow-up care  2. Class 1 obesity with body mass index (BMI) of 34.0 to 34.9 in adult  3. Moderate persistent asthma without complication  4. PCOS (polycystic ovarian syndrome)     Patient: Mira Jameson   : 2000 Sex: female MRN: [id number]   CSN: [id number] PCP: Celena Mcgill MD    Assessment/ Plan:  Mira Jameson is a 24 y.o. female  day(s) POD # 2 weeks s/p laparoscopic appendectomy for acute appendicitis  Surgical follow-up care [Z09]    Plan  Stable postop   Removal of Steri-Strips  Discharge home follow-up  Advised MiraLAX 17 or powder with 10 ounce of water daily hold if diarrhea    Care was discussed patient and patient's  at bedside all question answered to their satisfaction    SUBJECTIVE:   Doing very well I was in the ER had CT scan everything looks appears good except some constipation  I am not taking any Percocet now  I have Struve constipation secondary to ovarian polycystic disease    OBJECTIVE:  No complaints  Minimal incisional pain  No fever no chills no rigors  Tolerating p.o. Diet well  Normal bowel movement history of constipation no diarrhea I am taking laxative  Ambulating well     Vitals:   Temp 97.8 °F (36.6 °C) (Core)   Ht 5' 4\" (1.626 m)   Wt 91.6 kg (202 lb)   LMP  (LMP Unknown)   BMI 34.67 kg/m²     Active medications:    Current Outpatient Medications:     albuterol (ACCUNEB) 1.25 MG/3ML nebulizer solution, Take 3 mL (1.25 mg total) by nebulization every 6 (six) hours as needed for wheezing or shortness of breath, Disp: 3 mL, Rfl: 5    albuterol (PROVENTIL HFA,VENTOLIN HFA) 90 mcg/act inhaler, Inhale 2 puffs every 6 (six) hours as needed for wheezing, Disp: 18 g, Rfl: 3    carbamide peroxide (DEBROX) 6.5 % otic solution, Administer 5 drops into both ears 2 (two) times a day, Disp: 15 mL, Rfl: 0    methocarbamol (ROBAXIN) 500 mg tablet, Take 1 tablet (500 mg total) by mouth 2 (two) times " a day as needed for muscle spasms, Disp: 20 tablet, Rfl: 0    pantoprazole (PROTONIX) 40 mg tablet, Take 1 tablet (40 mg total) by mouth daily, Disp: 30 tablet, Rfl: 1    polyethylene glycol (MIRALAX) 17 g packet, Take 17 g by mouth daily, Disp: 238 g, Rfl: 0    psyllium (METAMUCIL) 58.6 % packet, Take 1 packet by mouth daily, Disp: , Rfl:     ARIPiprazole (ABILIFY) 15 mg tablet, Take 15 mg by mouth daily (Patient not taking: Reported on 1/22/2025), Disp: , Rfl:     naproxen (Naprosyn) 500 mg tablet, Take 1 tablet (500 mg total) by mouth 2 (two) times a day with meals for 4 days, Disp: 8 tablet, Rfl: 0    traZODone (DESYREL) 50 mg tablet, Take 50 mg by mouth daily at bedtime (Patient not taking: Reported on 1/22/2025), Disp: , Rfl:     Physical Exam:   General Alert awake   Normocephalic atraumatic PERRLA   Lungs clear bilaterally  Cardiac normal S1 normal S2  Abdomen soft,non tender Bowel sounds present  Skin: surgical dressing is C/D/I  Ext: No clubbing, cyanosis, edema  Surgical wound well healed    Visit Diagnosis: . Diagnoses and all orders for this visit:    Surgical follow-up care    Class 1 obesity with body mass index (BMI) of 34.0 to 34.9 in adult    Moderate persistent asthma without complication    PCOS (polycystic ovarian syndrome)       Plan of care was discussed with patient in detail    Pertinent labs reviewed  Most Recent Labs:   Admission on 01/20/2025, Discharged on 01/20/2025   Component Date Value Ref Range Status    WBC 01/20/2025 5.34  4.31 - 10.16 Thousand/uL Final    RBC 01/20/2025 4.28  3.81 - 5.12 Million/uL Final    Hemoglobin 01/20/2025 12.2  11.5 - 15.4 g/dL Final    Hematocrit 01/20/2025 38.6  34.8 - 46.1 % Final    MCV 01/20/2025 90  82 - 98 fL Final    MCH 01/20/2025 28.5  26.8 - 34.3 pg Final    MCHC 01/20/2025 31.6  31.4 - 37.4 g/dL Final    RDW 01/20/2025 13.6  11.6 - 15.1 % Final    MPV 01/20/2025 9.4  8.9 - 12.7 fL Final    Platelets 01/20/2025 338  149 - 390 Thousands/uL Final     nRBC 01/20/2025 0  /100 WBCs Final    Segmented % 01/20/2025 48  43 - 75 % Final    Immature Grans % 01/20/2025 0  0 - 2 % Final    Lymphocytes % 01/20/2025 44  14 - 44 % Final    Monocytes % 01/20/2025 6  4 - 12 % Final    Eosinophils Relative 01/20/2025 2  0 - 6 % Final    Basophils Relative 01/20/2025 0  0 - 1 % Final    Absolute Neutrophils 01/20/2025 2.60  1.85 - 7.62 Thousands/µL Final    Absolute Immature Grans 01/20/2025 0.01  0.00 - 0.20 Thousand/uL Final    Absolute Lymphocytes 01/20/2025 2.33  0.60 - 4.47 Thousands/µL Final    Absolute Monocytes 01/20/2025 0.30  0.17 - 1.22 Thousand/µL Final    Eosinophils Absolute 01/20/2025 0.08  0.00 - 0.61 Thousand/µL Final    Basophils Absolute 01/20/2025 0.02  0.00 - 0.10 Thousands/µL Final    Sodium 01/20/2025 134 (L)  135 - 147 mmol/L Final    Potassium 01/20/2025 4.4  3.5 - 5.3 mmol/L Final    Chloride 01/20/2025 104  96 - 108 mmol/L Final    CO2 01/20/2025 25  21 - 32 mmol/L Final    ANION GAP 01/20/2025 5  4 - 13 mmol/L Final    BUN 01/20/2025 11  5 - 25 mg/dL Final    Creatinine 01/20/2025 0.75  0.60 - 1.30 mg/dL Final    Standardized to IDMS reference method    Glucose 01/20/2025 86  65 - 140 mg/dL Final    If the patient is fasting, the ADA then defines impaired fasting glucose as > 100 mg/dL and diabetes as > or equal to 123 mg/dL.    Calcium 01/20/2025 9.6  8.4 - 10.2 mg/dL Final    AST 01/20/2025 18  13 - 39 U/L Final    ALT 01/20/2025 15  7 - 52 U/L Final    Specimen collection should occur prior to Sulfasalazine administration due to the potential for falsely depressed results.     Alkaline Phosphatase 01/20/2025 61  34 - 104 U/L Final    Total Protein 01/20/2025 7.4  6.4 - 8.4 g/dL Final    Albumin 01/20/2025 4.5  3.5 - 5.0 g/dL Final    Total Bilirubin 01/20/2025 0.42  0.20 - 1.00 mg/dL Final    Use of this assay is not recommended for patients undergoing treatment with eltrombopag due to the potential for falsely elevated  results.  N-acetyl-p-benzoquinone imine (metabolite of Acetaminophen) will generate erroneously low results in samples for patients that have taken an overdose of Acetaminophen.    eGFR 01/20/2025 113  ml/min/1.73sq m Final    Lipase 01/20/2025 18  11 - 82 u/L Final    EXT Preg Test, Ur 01/20/2025 Negative   Final    Control 01/20/2025 Valid   Final    Color, UA 01/20/2025 Colorless   Final    Clarity, UA 01/20/2025 Clear   Final    Specific Gravity, UA 01/20/2025 1.020  1.005 - 1.030 Final    pH, UA 01/20/2025 5.5  4.5, 5.0, 5.5, 6.0, 6.5, 7.0, 7.5, 8.0 Final    Leukocytes, UA 01/20/2025 Negative  Negative Final    Nitrite, UA 01/20/2025 Negative  Negative Final    Protein, UA 01/20/2025 Negative  Negative mg/dl Final    Glucose, UA 01/20/2025 Negative  Negative mg/dl Final    Ketones, UA 01/20/2025 Negative  Negative mg/dl Final    Urobilinogen, UA 01/20/2025 <2.0  <2.0 mg/dl mg/dl Final    Bilirubin, UA 01/20/2025 Negative  Negative Final    Occult Blood, UA 01/20/2025 Negative  Negative Final   Office Visit on 01/20/2025   Component Date Value Ref Range Status    LEUKOCYTE ESTERASE,UA 01/20/2025 neg   Final    NITRITE,UA 01/20/2025 neg   Final    SL AMB POCT UROBILINOGEN 01/20/2025 0.2   Final    POCT URINE PROTEIN 01/20/2025 neg   Final     PH,UA 01/20/2025 5.5   Final    BLOOD,UA 01/20/2025 10   Final    SPECIFIC GRAVITY,UA 01/20/2025 1.015   Final    KETONES,UA 01/20/2025 neg   Final    BILIRUBIN,UA 01/20/2025 neg   Final    GLUCOSE, UA 01/20/2025 neg   Final     COLOR,UA 01/20/2025 yellow   Final    CLARITY,UA 01/20/2025 clear   Final   Appointment on 01/09/2025   Component Date Value Ref Range Status    Hepatitis C Ab 01/09/2025 Non-reactive  Non-Reactive Final    HIV-1 p24 Antigen 01/09/2025 Non-Reactive  Non-Reactive Final    HIV-1 Antibody 01/09/2025 Non-Reactive  Non-Reactive Final    HIV-2 Antibody 01/09/2025 Non-Reactive  Non-Reactive Final    HIV Ag-Ab 5th Gen 01/09/2025 Non-Reactive  Non-Reactive  Final    A Non-Reactive test result does not preclude the possibility of exposure or infection with HIV-1 and/or HIV-2.  Non-Reactive results can occur if the quantity of marker present is below the detection limits or is not present during the stage of disease in which a sample is collected. Repeat testing should be considered where there is clinical suspicion of infection.       Hemoglobin A1C 01/09/2025 6.1 (H)  Normal 4.0-5.6%; PreDiabetic 5.7-6.4%; Diabetic >=6.5%; Glycemic control for adults with diabetes <7.0% % Final    EAG 01/09/2025 128  mg/dl Final    Sodium 01/09/2025 135  135 - 147 mmol/L Final    Potassium 01/09/2025 4.1  3.5 - 5.3 mmol/L Final    Chloride 01/09/2025 100  96 - 108 mmol/L Final    CO2 01/09/2025 28  21 - 32 mmol/L Final    ANION GAP 01/09/2025 7  4 - 13 mmol/L Final    BUN 01/09/2025 13  5 - 25 mg/dL Final    Creatinine 01/09/2025 0.77  0.60 - 1.30 mg/dL Final    Standardized to IDMS reference method    Glucose, Fasting 01/09/2025 85  65 - 99 mg/dL Final    Calcium 01/09/2025 9.5  8.4 - 10.2 mg/dL Final    AST 01/09/2025 14  13 - 39 U/L Final    ALT 01/09/2025 11  7 - 52 U/L Final    Specimen collection should occur prior to Sulfasalazine administration due to the potential for falsely depressed results.     Alkaline Phosphatase 01/09/2025 69  34 - 104 U/L Final    Total Protein 01/09/2025 7.4  6.4 - 8.4 g/dL Final    Albumin 01/09/2025 4.4  3.5 - 5.0 g/dL Final    Total Bilirubin 01/09/2025 0.39  0.20 - 1.00 mg/dL Final    Use of this assay is not recommended for patients undergoing treatment with eltrombopag due to the potential for falsely elevated results.  N-acetyl-p-benzoquinone imine (metabolite of Acetaminophen) will generate erroneously low results in samples for patients that have taken an overdose of Acetaminophen.    eGFR 01/09/2025 109  ml/min/1.73sq m Final    Cholesterol 01/09/2025 125  See Comment mg/dL Final    Cholesterol:         Pediatric <18 Years        Desirable           <170 mg/dL      Borderline High    170-199 mg/dL      High               >=200 mg/dL        Adult >=18 Years            Desirable         <200 mg/dL      Borderline High   200-239 mg/dL      High              >239 mg/dL      Triglycerides 01/09/2025 147  See Comment mg/dL Final    Triglyceride:     0-9Y            <75mg/dL     10Y-17Y         <90 mg/dL       >=18Y     Normal          <150 mg/dL     Borderline High 150-199 mg/dL     High            200-499 mg/dL        Very High       >499 mg/dL    Specimen collection should occur prior to Metamizole administration due to the potential for falsely depressed results.    HDL, Direct 01/09/2025 48 (L)  >=50 mg/dL Final    LDL Calculated 01/09/2025 48  0 - 100 mg/dL Final    LDL Cholesterol:     Optimal           <100 mg/dl     Near Optimal      100-129 mg/dl     Above Optimal       Borderline High 130-159 mg/dl       High            160-189 mg/dl       Very High       >189 mg/dl         This screening LDL is a calculated result.   It does not have the accuracy of the Direct Measured LDL in the monitoring of patients with hyperlipidemia and/or statin therapy.   Direct Measure LDL (FBI784) must be ordered separately in these patients.    Non-HDL-Chol (CHOL-HDL) 01/09/2025 77  mg/dl Final    TSH 3RD GENERATON 01/09/2025 2.181  0.450 - 4.500 uIU/mL Final    The recommended reference ranges for TSH during pregnancy are as follows:   First trimester 0.100 to 2.500 uIU/mL   Second trimester  0.200 to 3.000 uIU/mL   Third trimester 0.300 to 3.000 uIU/m    Note: Normal ranges may not apply to patients who are transgender, non-binary, or whose legal sex, sex at birth, and gender identity differ.  Adult TSH (3rd generation) reference range follows the recommended guidelines of the American Thy[employer] Association, January, 2020.    Prolactin 01/09/2025 18.72  3.34 - 26.72 ng/mL Final    PROLACTIN:     Note: Normal ranges may not apply to patients who are transgender,  non-binary, or whose legal sex, sex at birth, and gender identity differ.    17-OH PROGESTERONE 01/09/2025 46  ng/dL Final                              Adult Female                             Follicular        15 -  70                             Luteal            35 - 290    Preg, Serum 01/09/2025 Negative  Negative Final       Pertinent images and available reads personally reviewed  Procedure: CT abdomen pelvis with contrast  Result Date: 1/20/2025  Narrative: CT ABDOMEN AND PELVIS WITH IV CONTRAST INDICATION: Right lower back pain x 3 days, fever 101 yest, 13 days s/p appendectomy. COMPARISON: CT 1/7/2025 TECHNIQUE: CT examination of the abdomen and pelvis was performed. Multiplanar 2D reformatted images were created from the source data. This examination, like all CT scans performed in the [facility], was performed utilizing techniques to minimize radiation dose exposure, including the use of iterative reconstruction and automated exposure control. Radiation dose length product (DLP) for this visit: 522.17 mGy-cm IV Contrast: 100 mL of iohexol (OMNIPAQUE) Enteric Contrast: Not administered. FINDINGS: ABDOMEN LOWER CHEST: No clinically significant abnormality in the visualized lower chest. LIVER/BILIARY TREE: Subcentimeter hypoattenuating lesion(s), too small to characterize but statistically likely benign, which do not require follow-up (ACR White Paper 2017). No suspicious mass. Normal hepatic contours. No biliary dilation. GALLBLADDER: No calcified gallstones. No pericholecystic inflammatory change. SPLEEN: Unremarkable. PANCREAS: Unremarkable. ADRENAL GLANDS: Unremarkable. KIDNEYS/URETERS: Unremarkable. No hydronephrosis. STOMACH AND BOWEL: Unremarkable. APPENDIX: Surgically absent. No focal inflammation within this region. ABDOMINOPELVIC CAVITY: No ascites. No pneumoperitoneum. No lymphadenopathy. VESSELS: Unremarkable for patient's age. PELVIS REPRODUCTIVE ORGANS: Unremarkable for  patient's age. URINARY BLADDER: Unremarkable. ABDOMINAL WALL/INGUINAL REGIONS: There is residual stranding within the periumbilical region, without an organized fluid collection or subcutaneous gas. This is consistent with recent laparoscopic surgery. BONES: No acute fracture or suspicious osseous lesion.     Impression: No identifiable acute abnormality to account for the patient's clinical presentation. Workstation performed: [id number]     Procedure: CT abdomen pelvis with contrast  Result Date: 1/7/2025  Narrative: CT ABDOMEN AND PELVIS WITH IV CONTRAST INDICATION: b/l lower abdominal pain, diarrhea, vomiting. . COMPARISON: None. TECHNIQUE: CT examination of the abdomen and pelvis was performed. Multiplanar 2D reformatted images were created from the source data. This examination, like all CT scans performed in the [facility], was performed utilizing techniques to minimize radiation dose exposure, including the use of iterative reconstruction and automated exposure control. Radiation dose length product (DLP) for this visit: 508.16 mGy-cm IV Contrast: 100 mL of iohexol (OMNIPAQUE) Enteric Contrast: Not administered. FINDINGS: ABDOMEN LOWER CHEST: No clinically significant abnormality in the visualized lower chest. LIVER/BILIARY TREE: Unremarkable. GALLBLADDER: No calcified gallstones. No pericholecystic inflammatory change. SPLEEN: Unremarkable. PANCREAS: Unremarkable. ADRENAL GLANDS: Unremarkable. KIDNEYS/URETERS: Unremarkable. No hydronephrosis. STOMACH AND BOWEL: Unremarkable. APPENDIX: The appendix is mildly dilated measuring up to 8 mm in maximal diameter and demonstrates rim enhancement suspicious for acute appendicitis. No evidence for perforation. ABDOMINOPELVIC CAVITY: No ascites. No pneumoperitoneum. No lymphadenopathy. VESSELS: Unremarkable for patient's age. PELVIS REPRODUCTIVE ORGANS: Unremarkable for patient's age. URINARY BLADDER: Unremarkable. ABDOMINAL WALL/INGUINAL REGIONS:  "Unremarkable. BONES: No acute fracture or suspicious osseous lesion.     Impression: Findings suspicious for acute appendicitis. No evidence for perforation. I personally discussed this study with JOSE LIM on 1/7/2025 9:36 AM. Workstation performed: [id number]         Pertinent notes reviewed  Final Diagnosis   A.  Appendix, appendectomy:    -   Acute appendicitis with periappendicitis.      Electronically signed by Bunny Talbot MD on 1/10/2025 at 1422 EST       Counseling / Coordination of Care  Total Office time /unit time spent today 15minutes. Greater than 50% of total time was spent with the patient and / or family counseling and / or coordination of care. A description of the counseling / coordination of care:  I performed an interim history, pertinent images and labs, performed a physical examination to arrive at the plan delineated above with associated thought processes.     Family member/primary contact /significant other at bedside updated     Deonte Hart MD MS [facility]  01/22/25 10:00 AM        Portions of the record may have been created with voice recognition software. Occasional wrong word or \"sound a like\" substitutions may have occurred due to the inherent limitations of voice recognition software. Read the chart carefully and recognize, using context, where substitutions have occurred.        "

## 2025-01-28 ENCOUNTER — PATIENT OUTREACH (OUTPATIENT)
Age: 25
End: 2025-01-28

## 2025-01-28 NOTE — PROGRESS NOTES
SWCM received referral from provider to assist patient with connection to behavioral health resources.     SWCM completed chart review. Per chart, patient has history of depression, bipolar, schizoaffective, and ADHD.     SWCM called patient to introduce self and offer support. SWCM unable to reach patient. SWCM unable to leave a voice message as opportunity was not given.     SWCM will continue to follow up and remain available to assist as needed.

## 2025-01-30 ENCOUNTER — PATIENT OUTREACH (OUTPATIENT)
Age: 25
End: 2025-01-30

## 2025-01-30 NOTE — PROGRESS NOTES
"SWCM received return call from patient. SWCM introduced self, role, and reason for outreach. Contact information provided.     Patient reports diagnosis of schizoaffective disorder and is currently struggling with depressive symptoms related to bipolar disorder. Patient reports symptoms such as fatigue, lack of interest, negative self-talk, and feeling like a burden. Patient denies thoughts to harm self or others, and is specifically interested in pursuing talk therapy in tandem with psychiatry for symptom management. Patient reports being in the process of establishing an appointment with [facility] for medication assistance.    Patient confirmed receiving talk therapy services before, but has not been established for several years. Patient requests information to be emailed to \"[email or web address]\" - Email sent, as listed below.    SWCM emphasized the importance of contacting crisis in the event of a mental health emergency, and leaning on her family/friend support system as needed.    Patient denies further [facility] needs at this time, including housing, utility, and food insecurity.    SW will close referral and remain available as further needs arise.      .Outpatient Mental Health Resources   [place] Suicide and Crisis Lifeline 988 (call or text)   Crisis Text Line Text HOME to 089497 or Message on [place] (Crisis Text Line)   [place] Hopeline 1-952.675.7467   2nd Floor Youth Helpline 1-886.429.3500 (call or text)   [facility] Crisis [phone]     [facility]  Address: [address] [place] [address]  Phone: [phone]  English; Children and adults of all ages for counseling; In-person and/or telehealth; Accepts Medicare and out of network benefits    [email or web address]  Address: [address] [place] [address]  Phone: [phone]  English; Ages 13+ for psychotherapy, medication management and evaluation; " Accepts Cigna, Horizon BCBS, [employer] Healthcare, Optum, Medicare, Aetna, and [employer] Health    [facility]  Address: [address] [address] [address] [place] [address]  Phone: [phone]  English; Ages 5+ for individual, family, and couples counseling; In-person and/or telehealth services; Accepts Aetna, Horizon BCBS, Cigna, [employer], and [facility]  Address: [address] [place], [address]  Phone: [phone]  Bilingual; Ages 4+ for family, individual, play therapy, psychiatric evaluations, and medication monitoring; Accepts Medicaid and Medicare    [facility]  Address: [address], [place] [address]  Phone:  [phone]  Bilingual; Ages 4+ for counseling and 18+ for psychiatry and med management; Accept ONLY Medicaid    [facility]  Address: [address] [place] [address]  Phone: [phone]  Bilingual; Ages 5+ for counseling and psychiatry; Accept Medicare, Medicaid, and some commercial insurances  [facility]  Various locations  Phone: [phone] [phone]  Bilingual; Ages 3+ for counseling, psychiatry, and medication management; Accepts ONLY Medicaid    [facility]  Address: [facility] [place] [address]  Phone: [phone]  English; Ages 6+ for counseling; Accepts Medicare, Medicaid, and some commercial insurances    [facility]  Address: [address], [place]. [address]  Phone: [phone]  English; Children and adults of all ages for counseling, psychiatry and medication management; In-person and/or telehealth; Cigna, Optum, Aetna, Horizon BCBS, Humana, Amerihealth, Medicaid, BCBS, United Healthcare, [place] BC, [place] BCBS, UMR, [place]. All [email or web address], [employer], [place] BC and self-pay    [facility]  Address: [address] [place] [address] [address], [facility]  [place] [address]  Phone: [phone]  Bilingual; Ages 3+ for counseling; In-person and/or telehealth; Accept Horizon BCBS, Aetna, United Health Care, Optum, AmeriHealth, [place], Medicare, Cigna, and self-pay    [facility]  Address: [address] [place] [address]  Phone: [phone]  English; Ages 18+ for counseling; Accepts Medicare, Medicaid, and some commercial insurances    [facility]  Address: [address] [place] [address]  Phone: [phone]  English; Ages 18+ for counseling; Accepts Medicare, Medicaid, and some commercial insurances    [facility]  Address: [address] [place] [address]  Phone: [phone]  English; Ages 6+ for counseling; Accepts Amerihealth, Aetna, United Healthcare, BCBS and other commercial insurances    ROS[facility] Rivera, [facility]  Address: [address] [place] [address]  Phone: [phone]  English; Ages 10+ for counseling; Accepts Medicare, Medicaid, and some commercial insurances    [facility]  Address: [address] [place] [address]  Phone:  [phone]  Bilingual; Ages 6+ for counseling, psychiatry, and medication management; offers in person or telehealth; Accepts Medicaid, Medicare, self-pay and most private insurances    [facility] [place]  Address: [address] [facility] [facility] [place] [address]  Phone: [phone]  English; Ages 6+ for counseling, psychiatry, and medication management; ONLY telehealth; Accepts Medicaid, Aetna, Optum, [place], United Health Care, Medicare (Medication Management Only), Horizon BCBS and Horizon [place] Health    [facility]  Address: [address] [place] [address]  Phone: [phone]  English; Services for all ages that need counseling, psychiatry and medication management; Accepts self-pay, Cigna, Horizon BCBS (HMO & PPO), Most BC/BS plans, [employer], AmeriHealth, [facility], and [facility]  Address: [address]  [place] [address]  Phone: [phone]  English; Ages 10+ for counseling; Accepts Medicare and some commercial insurances    [place] [facility]  Address: [address] [facility] [address] [facility] [address] [place] [address]  Phone: [phone]  Bilingual; Ages 3+ for counseling, psychiatry, and medication management; Accepts Medicaid, Medicare, and commercial insurances    [facility]  Address: [address] [place] [address]  Phone: [phone]  Bilingual; Ages 5+ for counseling, psychiatry, and medication management; Accepts Medicare and Medicaid insurance    [facility]  Address: [address] [place] [address]  Phone: [phone]  English: Ages 13+ for counseling; Accepts Cigna, United Healthcare, Aetna, BCBS and self-pay     [facility] [place]  Address: [address] [facility] [address] [facility] [address] [place] [address]  Phone: [phone]  English; Children and adults of all ages for counseling, psychiatric evaluation, and medication management; Accepts Medicare and some commercial plans    [facility]  Address: [address] [place] [address]  Intake Number: [phone]  Phone: [phone]  Bilingual; Ages 5+ for counseling, psychiatry, and medication management; Does NOT accept [place] Medicaid; Offers therapy, psychiatry, and medication management    [facility]  Address: [address] [place] [address] [address] [place] [address]  Phone: [phone]  Bilingual; Only ages 5 to 21 for behavioral health, intellectual/developmental disability, or substance use; Accepts Medicaid, Medicare, and some commercial plans    [facility])  Address: [address] [place] [address]  Phone: [phone]  English; Ages 4-18 children for counseling; Accepts ONLY Medicaid    [facility]  Address: [address] [facility] [place] [address]  Phone: [phone]  English; Ages 4+ for counseling; Accepts Medicaid and  some commercial insurances    Patient instructed to contact their insurance for additional information.

## 2025-01-30 NOTE — PROGRESS NOTES
PABLO GUTIERRES called patient to follow up and assist with needs. PABLO GUTIERRES unable to reach patient (x2). Left voice message requesting return call. Contact information provided. PABLO GUTIERRES sent UTR letter. PABLO GUTIERRES closed case and removed self from care team.     PABLO GUTIERRES will remain available to assist as needed.

## 2025-02-04 DIAGNOSIS — K21.9 GASTROESOPHAGEAL REFLUX DISEASE, UNSPECIFIED WHETHER ESOPHAGITIS PRESENT: ICD-10-CM

## 2025-02-04 RX ORDER — PANTOPRAZOLE SODIUM 40 MG/1
40 TABLET, DELAYED RELEASE ORAL DAILY
Qty: 90 TABLET | Refills: 1 | Status: SHIPPED | OUTPATIENT
Start: 2025-02-04

## 2025-02-10 ENCOUNTER — OFFICE VISIT (OUTPATIENT)
Age: 25
End: 2025-02-10

## 2025-02-10 VITALS
RESPIRATION RATE: 18 BRPM | SYSTOLIC BLOOD PRESSURE: 109 MMHG | TEMPERATURE: 98 F | BODY MASS INDEX: 33.8 KG/M2 | HEART RATE: 90 BPM | DIASTOLIC BLOOD PRESSURE: 76 MMHG | HEIGHT: 64 IN | OXYGEN SATURATION: 99 % | WEIGHT: 198 LBS

## 2025-02-10 DIAGNOSIS — L60.0 INGROWN TOENAIL OF BOTH FEET: Primary | ICD-10-CM

## 2025-02-10 DIAGNOSIS — D64.9 LOW HEMOGLOBIN: ICD-10-CM

## 2025-02-10 DIAGNOSIS — Z23 ENCOUNTER FOR IMMUNIZATION: ICD-10-CM

## 2025-02-10 PROCEDURE — 90472 IMMUNIZATION ADMIN EACH ADD: CPT | Performed by: FAMILY MEDICINE

## 2025-02-10 PROCEDURE — 90471 IMMUNIZATION ADMIN: CPT | Performed by: FAMILY MEDICINE

## 2025-02-10 PROCEDURE — 99213 OFFICE O/P EST LOW 20 MIN: CPT | Performed by: FAMILY MEDICINE

## 2025-02-10 PROCEDURE — 90677 PCV20 VACCINE IM: CPT | Performed by: FAMILY MEDICINE

## 2025-02-10 PROCEDURE — 90651 9VHPV VACCINE 2/3 DOSE IM: CPT | Performed by: FAMILY MEDICINE

## 2025-02-11 NOTE — PROGRESS NOTES
Name: Mira Jameson      : 2000      MRN: [id number]  Encounter Provider: Emily Cervantes MD  Encounter Date: 2/10/2025   Encounter department: [facility]  :  Assessment & Plan  Ingrown toenail of both feet  Patient noted to have history of ingrown toenails  He notes that she had pus coming out of her left big toe over the weekend  She notes that her footwear consists of flat shoes and sneakers.  She was noted to be tight in office noted to be tight around the toes  Patient also noted to be wearing tight socks  No erythema or pus noted on exam  Referral to podiatry  Patient education provided  Advised patient not to cut toenails short  Advised doing warm soaks with Epsom salt  Orders:    Ambulatory Referral to Podiatry; Future    Encounter for immunization  Pneumonia and HPV vaccine provided in office today  Orders:    Pneumococcal Conjugate Vaccine 20-valent (Pcv20)    HPV VACCINE 9 VALENT IM    Low hemoglobin  History of low hemoglobin in the past, patient continues to note restless leg syndrome symptoms  Iron panel ordered  Orders:    Iron Panel (Includes Ferritin, Iron Sat%, Iron, and TIBC); Future           History of Present Illness   Mira presents to the office for toe pain. This is a recurrent problem. She has history of recurrent ingrown toenails of bilateral big toes.  She notes that over the weekend she had pus coming out of her left toe and it was erythematous.  On exam today, there is no pus or erythema noted.    Toe Pain   The incident occurred more than 1 week ago. The incident occurred at home. There was no injury mechanism. The pain is present in the right toes and left toes. The quality of the pain is described as burning and stabbing. The pain is moderate. The pain has been Fluctuating since onset. Pertinent negatives include no inability to bear weight, loss of motion, loss of sensation, muscle weakness, numbness or tingling. She reports no  "foreign bodies present. The symptoms are aggravated by palpation. The treatment provided no relief.     Review of Systems   Constitutional:  Negative for chills and fever.   HENT:  Negative for ear pain and sore throat.    Eyes:  Negative for pain and visual disturbance.   Respiratory:  Negative for cough and shortness of breath.    Cardiovascular:  Negative for chest pain and palpitations.   Gastrointestinal:  Negative for abdominal pain and vomiting.   Genitourinary:  Negative for dysuria and hematuria.   Musculoskeletal:  Negative for arthralgias and back pain.   Skin:  Positive for wound (left big toe). Negative for color change and rash.   Neurological:  Negative for tingling, seizures, syncope and numbness.   All other systems reviewed and are negative.      Objective   /76 (BP Location: Left arm, Patient Position: Sitting, Cuff Size: Large)   Pulse 90   Temp 98 °F (36.7 °C) (Tympanic)   Resp 18   Ht 5' 4\" (1.626 m)   Wt 89.8 kg (198 lb)   SpO2 99%   BMI 33.99 kg/m²      Physical Exam  Vitals and nursing note reviewed.   Constitutional:       General: She is not in acute distress.     Appearance: She is well-developed.   HENT:      Head: Normocephalic and atraumatic.   Eyes:      Conjunctiva/sclera: Conjunctivae normal.   Cardiovascular:      Rate and Rhythm: Normal rate and regular rhythm.      Heart sounds: No murmur heard.  Pulmonary:      Effort: Pulmonary effort is normal. No respiratory distress.      Breath sounds: Normal breath sounds.   Abdominal:      Palpations: Abdomen is soft.      Tenderness: There is no abdominal tenderness.   Musculoskeletal:         General: No swelling.      Cervical back: Neck supple.   Feet:      Right foot:      Skin integrity: Dry skin present.      Toenail Condition: Right toenails are ingrown.      Left foot:      Skin integrity: Dry skin present.      Toenail Condition: Left toenails are ingrown.   Skin:     General: Skin is warm and dry.      Capillary " Refill: Capillary refill takes less than 2 seconds.   Neurological:      Mental Status: She is alert.   Psychiatric:         Mood and Affect: Mood normal.

## 2025-02-13 ENCOUNTER — ANNUAL EXAM (OUTPATIENT)
Age: 25
End: 2025-02-13

## 2025-02-13 VITALS
WEIGHT: 199 LBS | RESPIRATION RATE: 18 BRPM | BODY MASS INDEX: 33.97 KG/M2 | SYSTOLIC BLOOD PRESSURE: 123 MMHG | HEIGHT: 64 IN | DIASTOLIC BLOOD PRESSURE: 85 MMHG | HEART RATE: 66 BPM | OXYGEN SATURATION: 99 %

## 2025-02-13 DIAGNOSIS — R73.03 PREDIABETES: ICD-10-CM

## 2025-02-13 DIAGNOSIS — N89.8 VAGINAL DISCHARGE: ICD-10-CM

## 2025-02-13 DIAGNOSIS — Z01.419 ENCOUNTER FOR GYNECOLOGICAL EXAMINATION WITHOUT ABNORMAL FINDING: Primary | ICD-10-CM

## 2025-02-13 DIAGNOSIS — Z11.3 SCREENING FOR STDS (SEXUALLY TRANSMITTED DISEASES): ICD-10-CM

## 2025-02-13 DIAGNOSIS — E28.2 PCOS (POLYCYSTIC OVARIAN SYNDROME): ICD-10-CM

## 2025-02-13 DIAGNOSIS — Z12.4 CERVICAL CANCER SCREENING: ICD-10-CM

## 2025-02-13 DIAGNOSIS — Z12.4 SCREENING FOR MALIGNANT NEOPLASM OF CERVIX: ICD-10-CM

## 2025-02-13 DIAGNOSIS — N92.6 IRREGULAR MENSES: ICD-10-CM

## 2025-02-13 LAB — SL AMB POCT URINE HCG: NEGATIVE

## 2025-02-13 PROCEDURE — 81025 URINE PREGNANCY TEST: CPT | Performed by: OBSTETRICS & GYNECOLOGY

## 2025-02-13 PROCEDURE — 99395 PREV VISIT EST AGE 18-39: CPT | Performed by: OBSTETRICS & GYNECOLOGY

## 2025-02-13 PROCEDURE — 99213 OFFICE O/P EST LOW 20 MIN: CPT | Performed by: OBSTETRICS & GYNECOLOGY

## 2025-02-13 PROCEDURE — 87661 TRICHOMONAS VAGINALIS AMPLIF: CPT

## 2025-02-13 PROCEDURE — 87624 HPV HI-RISK TYP POOLED RSLT: CPT

## 2025-02-13 PROCEDURE — 81514 NFCT DS BV&VAGINITIS DNA ALG: CPT

## 2025-02-13 PROCEDURE — 87563 M. GENITALIUM AMP PROBE: CPT

## 2025-02-13 PROCEDURE — G0145 SCR C/V CYTO,THINLAYER,RESCR: HCPCS | Performed by: PATHOLOGY

## 2025-02-13 NOTE — PROGRESS NOTES
Annual GYN Visit    Assessment     Mira was seen today for gynecologic exam.    Diagnoses and all orders for this visit:    Encounter for gynecological examination without abnormal finding  -     Liquid-based pap, screening; Future  -     Liquid-based pap, screening    PCOS (polycystic ovarian syndrome)  Meets [place] criteria for PCOS based on Oligomenorrhea and clinical signs of hyperandrogenism such as hirsutism. Patient also has elevated HbA1c of 6.1 and signs of depression     LMP 2/3 - 2/10 heavy with clots; prior to this her last LMP was 6/3 - 6/12     Urine pregnancy test today negative   Prolactin and 17 hydroxyprogesterone wnl     Start Metformin 500 mg daily   Counseled extensively on diet and weight loss as this may restore spontaneous ovulation   Patient unable to get pregnant since over a year, discussed ovulation induction agents such as Letrozole or clomiphene, however since patient is in prediabetic range, advised that if she were to get pregnant it would be high risk pregnancy considering her obesity and HbA1c levels   Patient does not want to be on birth control to regularize her menstrual cycle.   Patient wants to lose weight and work on lowering HbA1c before attempting pregnancy/ fertility treatments/counseling. Notes she will use barrier methods such as condoms   Discussed about referral to [facility], patient will think about it and discuss at next visit   F/u in 1 month with PCP    Screening for malignant neoplasm of cervix  -     Liquid-based pap, screening; Future  -     Liquid-based pap, screening    Prediabetes  -     metFORMIN (GLUCOPHAGE) 500 mg tablet; Take 1 tablet (500 mg total) by mouth in the morning    Cervical cancer screening    Screening for STDs (sexually transmitted diseases)  -     Molecular Vaginal Panel  -     Trichomonas vaginalis/Mycoplasma genitalium PCR    Irregular menses  -     POCT urine HCG    Plan    Await pap smear results.   specimen collected, await results    Molecular vaginal panel collected, await results        Screening:  Cervical cancer: Papsmear today, await results.  STD screening: patient interested and sample collected, f/u results     Subjective      Mira Jameson is a 24 y.o. female who presents for annual well woman exam. Concerns of low libido mostly from low self esteem, dyspareunia, and burning pain after intercourse. Complains of foul smelling vaginal odor and discharge.     LMP 2/3 - 2/10 heavy with clots and painful. Prior to this 6/3- 6/12   Periods are irregular.    GYN:  Endorses vaginal discharge and dyspareunia.  Menarche at 16 years.  Contraception: Never used. Trying to get pregnant.  Patient is  sexually active with 1 partner.  No gynecologic surgeries.    OB: never pregnant     :  No dysuria, urinary frequency or urgency.  No hematuria, flank pain, incontinence.    Breast:  No breast mass, skin changes, dimpling, reddening, nipple retraction.  No breast discharge.  Patient does not do monthly breast exams.  Counseled on how to perform monthly breast exams     General:  Diet: balanced  Exercise: walking   Work: currently not working due to recent surgery  ETOH use: never  Tobacco use: never  Recreational drug use: never  .    [id number]  I have discussed the importance of monthly self-breast exams, exercise and healthy diet as well as adequate intake of calcium and vitamin D. Safe sex practices have been discussed. The patient does desires STD testing.  The patient has received 1 dose of Gardasil vaccine series, which is recommended for patients from 9-26 years of age.  The current ASCCP guidelines were reviewed.  The low risk patient will receive pap smear screening every 3 years.  High risk patients will be triaged based on previous pap smear results, which have been reviewed.  I emphasized the importance of an annual pelvic and breast exam.  All questions have been answered to her satisfaction.    D/w Dr. Mosley     Review of  "Systems  Respiratory: negative  Cardiovascular: negative  Gastrointestinal: positive for constipation  Genitourinary:negative urinary symptoms, dyspareunia   Integument/breast: negative  Behavioral/Psych: positive for depression      Objective      /85 (BP Location: Left arm, Patient Position: Sitting)   Pulse 66   Resp 18   Ht 5' 4\" (1.626 m)   Wt 90.3 kg (199 lb)   LMP 02/06/2025 (Approximate)   SpO2 99%   BMI 34.16 kg/m²     Physical Exam  Exam conducted with a chaperone present.   Constitutional:       General: She is not in acute distress.     Appearance: Normal appearance. She is normal weight. She is not ill-appearing.   HENT:      Head: Normocephalic and atraumatic.   Cardiovascular:      Rate and Rhythm: Normal rate and regular rhythm.      Pulses: Normal pulses.      Heart sounds: Normal heart sounds. No murmur heard.  Pulmonary:      Effort: Pulmonary effort is normal. No respiratory distress.      Breath sounds: Normal breath sounds. No wheezing.   Abdominal:      General: Abdomen is flat. There is no distension.      Palpations: Abdomen is soft. There is no mass.      Tenderness: There is no abdominal tenderness. There is no right CVA tenderness or left CVA tenderness.   Genitourinary:     General: Normal vulva.      Exam position: Lithotomy position.      Pubic Area: No rash or pubic lice.       Abdirahman stage (genital): 5.      Labia:         Right: No rash, tenderness, lesion or injury.         Left: No rash, tenderness, lesion or injury.       Vagina: Normal. No vaginal discharge, erythema, tenderness, bleeding or lesions.      Cervix: Normal. No discharge, erythema or cervical bleeding.      Uterus: Normal. Not fixed and not tender.       Adnexa: Right adnexa normal and left adnexa normal.        Right: No mass, tenderness or fullness.          Left: No mass, tenderness or fullness.        Comments: Normal physiological discharge   Musculoskeletal:      Right lower leg: No edema.      " Left lower leg: No edema.   Skin:     General: Skin is warm.      Comments: H[employer]   Neurological:      Mental Status: She is alert.       Ricky Ramirez MD  SLW  PGY-2

## 2025-02-13 NOTE — ASSESSMENT & PLAN NOTE
Meets [place] criteria for PCOS based on Oligomenorrhea and clinical signs of hyperandrogenism such as hirsutism. Patient also has elevated HbA1c of 6.1 and signs of depression   LMP 2/3 - 2/10 heavy with clots; prior to this her last LMP was 6/3 - 6/12     Urine pregnancy test today negative   Prolactin and 17 hydroxyprogesterone wnl     Start Metformin 500 mg daily   Counseled extensively on diet and weight loss as this may restore spontaneous ovulation   Patient unable to get pregnant since over a year, discussed ovulation induction agents such as Letrozole or clomiphene, however since patient is in prediabetic range, advised that if she were to get pregnant if would be high risk pregnancy considering her obesity and HbA1c levels   Patient does not want to be on birth control to regularize her menstrual cycle. Notes she will use barrier methods such as condoms   Patient wants to lose weight and work on lowering HbA1c before attempting pregnancy/ fertility treatments/counseling.   Discussed about referral to [facility], patient will think about it and discuss at next visit

## 2025-02-14 ENCOUNTER — OFFICE VISIT (OUTPATIENT)
Age: 25
End: 2025-02-14
Payer: COMMERCIAL

## 2025-02-14 ENCOUNTER — RESULTS FOLLOW-UP (OUTPATIENT)
Age: 25
End: 2025-02-14

## 2025-02-14 VITALS — BODY MASS INDEX: 33.97 KG/M2 | WEIGHT: 199 LBS | HEIGHT: 64 IN

## 2025-02-14 DIAGNOSIS — L60.0 INGROWN TOENAIL OF BOTH FEET: ICD-10-CM

## 2025-02-14 LAB
C GLABRATA DNA VAG QL NAA+PROBE: NEGATIVE
C KRUSEI DNA VAG QL NAA+PROBE: NEGATIVE
CANDIDA SP 6 PNL VAG NAA+PROBE: NEGATIVE
M GENITALIUM DNA SPEC QL NAA+PROBE: NEGATIVE
T VAGINALIS DNA SPEC QL NAA+PROBE: NEGATIVE
T VAGINALIS DNA VAG QL NAA+PROBE: NEGATIVE
VAGINOSIS/ITIS DNA PNL VAG PROBE+SIG AMP: NEGATIVE

## 2025-02-14 PROCEDURE — 11730 AVULSION NAIL PLATE SIMPLE 1: CPT | Performed by: STUDENT IN AN ORGANIZED HEALTH CARE EDUCATION/TRAINING PROGRAM

## 2025-02-14 PROCEDURE — 99203 OFFICE O/P NEW LOW 30 MIN: CPT | Performed by: STUDENT IN AN ORGANIZED HEALTH CARE EDUCATION/TRAINING PROGRAM

## 2025-02-14 NOTE — PROGRESS NOTES
"[facility] Podiatric Medicine and Surgery Office Visit    ASSESSMENT     Diagnoses and all orders for this visit:    Ingrown toenail of both feet  -     Ambulatory Referral to Podiatry  -     Nail removal         Problem List Items Addressed This Visit    None  Visit Diagnoses         Ingrown toenail of both feet        Relevant Orders    Nail removal          PLAN  -Nail avulsion performed as below:  -Nail bed was dressed with bacitracin, DSD, 1 inch Coban  -Patient instructed to take bandage off in 24 hours, wash area lightly with warm soap and water, dry area thoroughly, apply bacitracin and Band-Aid daily x10 days.  -Patient instructed to call our office for an earlier appointment should any extreme pain, redness, swelling, purulent drainage present.    Nail removal    Date/Time: 2/14/2025 8:45 AM    Performed by: Travon Silverio DPM  Authorized by: Travon Silverio DPM    Patient location:  Clinic  Indications / Diagnosis:  Ingrown toenail  Universal Protocol:  procedure performed by consultantConsent: Verbal consent obtained.  Risks and benefits: risks, benefits and alternatives were discussed  Consent given by: patient  Time out: Immediately prior to procedure a \"time out\" was called to verify the correct patient, procedure, equipment, support staff and site/side marked as required.  Patient understanding: patient states understanding of the procedure being performed  Site marked: the operative site was marked  Patient identity confirmed: verbally with patient    Location:     Foot:  R big toe  Pre-procedure details:     Skin preparation:  Alcohol and Betadine  Anesthesia (see MAR for exact dosages):     Anesthesia method:  Local infiltration    Local anesthetic:  Lidocaine 1% w/o epi  Nail Removal:     Nail removed:  Partial    Nail side:  Medial    Nail bed sutured: no    Ingrown nail:     Wedge excision of skin: no      Nail matrix removed or ablated:  None  Post-procedure details:     Dressing:  4x4 sterile " "gauze, antibiotic ointment and gauze roll    Patient tolerance of procedure:  Tolerated well, no immediate complications         SUBJECTIVE    Chief Complaint:  Toenail pain     Patient ID: Mira Meyers is a pleasant 24 year old female who presents today with bilateral ingrown toenails. She states that they have been bothering her for the last few months. She states that the right great toe is worse than the left. It have been getting infected and some puss has been coming out of it. She does admit that she has been picking at them which she knows is not helpful.          The following portions of the patient's history were reviewed and updated as appropriate: allergies, current medications, past family history, past medical history, past social history, past surgical history and problem list.    Review of Systems   Constitutional: Negative.    HENT: Negative.     Respiratory: Negative.     Cardiovascular: Negative.    Gastrointestinal: Negative.    Musculoskeletal: Negative.    Skin: Negative.    Neurological: Negative.          OBJECTIVE      Ht 5' 4\" (1.626 m)   Wt 90.3 kg (199 lb)   LMP 02/06/2025 (Approximate)   BMI 34.16 kg/m²        Physical Exam  Constitutional:       Appearance: Normal appearance.   HENT:      Head: Normocephalic and atraumatic.   Eyes:      General:         Right eye: No discharge.         Left eye: No discharge.   Cardiovascular:      Rate and Rhythm: Normal rate and regular rhythm.      Pulses:           Dorsalis pedis pulses are 2+ on the right side and 2+ on the left side.        Posterior tibial pulses are 2+ on the right side and 2+ on the left side.   Pulmonary:      Effort: Pulmonary effort is normal.      Breath sounds: Normal breath sounds.   Musculoskeletal:      Comments: Onychocryptosis noted to the medial right hallux nail border with no local signs of infection noted   Skin:     General: Skin is warm.      Capillary Refill: Capillary refill takes less than 2 " seconds.   Neurological:      Sensory: Sensation is intact. No sensory deficit.

## 2025-02-19 ENCOUNTER — TELEPHONE (OUTPATIENT)
Dept: OBGYN CLINIC | Facility: CLINIC | Age: 25
End: 2025-02-19

## 2025-02-20 LAB
HPV HR 12 DNA CVX QL NAA+PROBE: POSITIVE
HPV16 DNA CVX QL NAA+PROBE: NEGATIVE
HPV18 DNA CVX QL NAA+PROBE: NEGATIVE
LAB AP GYN PRIMARY INTERPRETATION: ABNORMAL
Lab: ABNORMAL
PATH INTERP SPEC-IMP: ABNORMAL

## 2025-02-20 PROCEDURE — G0124 SCREEN C/V THIN LAYER BY MD: HCPCS | Performed by: PATHOLOGY

## 2025-02-22 NOTE — RESULT ENCOUNTER NOTE
Discussed pap smear results with patient which showed ASCUS and HPV positive. Patient needs a colposcopy and agreeable.     Could you please assist in scheduling a colposcopy procedure for this patient with me and Dr. Mosley? Thank you!

## 2025-02-24 ENCOUNTER — TELEPHONE (OUTPATIENT)
Age: 25
End: 2025-02-24

## 2025-02-24 ENCOUNTER — OFFICE VISIT (OUTPATIENT)
Age: 25
End: 2025-02-24
Payer: COMMERCIAL

## 2025-02-24 VITALS — BODY MASS INDEX: 33.97 KG/M2 | WEIGHT: 199 LBS | HEIGHT: 64 IN

## 2025-02-24 DIAGNOSIS — L60.0 INGROWN TOENAIL OF RIGHT FOOT: ICD-10-CM

## 2025-02-24 DIAGNOSIS — M25.561 ACUTE PAIN OF RIGHT KNEE: Primary | ICD-10-CM

## 2025-02-24 PROCEDURE — 99212 OFFICE O/P EST SF 10 MIN: CPT | Performed by: STUDENT IN AN ORGANIZED HEALTH CARE EDUCATION/TRAINING PROGRAM

## 2025-02-24 NOTE — TELEPHONE ENCOUNTER
Patient called to discuss her pap smear results further.  She is requesting a call back at your convenience. He is worried about the abnormal pap smear and the HPV diagnosis.    She can be reached back at:  [phone]

## 2025-02-24 NOTE — TELEPHONE ENCOUNTER
----- Message from Ricky Ramirez MD sent at 2/22/2025  5:14 PM EST -----    Discussed pap smear results with patient which showed ASCUS and HPV positive. Patient needs a colposcopy and agreeable.     Could you please assist in scheduling a colposcopy procedure for this patient with me and Dr. Mosley? Thank you!

## 2025-02-24 NOTE — PROGRESS NOTES
[facility] Podiatric Medicine and Surgery Office Visit    ASSESSMENT     Diagnoses and all orders for this visit:    Acute pain of right knee  -     Ambulatory Referral to Orthopedic Surgery; Future    Ingrown toenail of right foot           Problem List Items Addressed This Visit    None  Visit Diagnoses         Acute pain of right knee    -  Primary    Relevant Orders    Ambulatory Referral to Orthopedic Surgery      Ingrown toenail of right foot                PLAN  Mira and I discussed her bilateral foot today.  The previous site of nail avulsion to the right foot is healing uneventfully.  We can hold off on left hallux nail avulsion today given the patient's improvement in pain.  Additionally, she complains of right knee pain, for this reason I have referred her to our orthopedic surgery team.  She should return to my office for new or worsening podiatric concerns as needed.      SUBJECTIVE    Chief Complaint:  Toenail pain     Patient ID: Mira Meyers is a pleasant 24 year old female who presents today with bilateral ingrown toenails. She states that they have been bothering her for the last few months. She states that the right great toe is worse than the left. It have been getting infected and some puss has been coming out of it. She does admit that she has been picking at them which she knows is not helpful.      2/24/2025: Mira presents today for her left ingrown toenail. She states that the day after her last visit her great toe started to become painful, red and swollen. There was also some puss coming out at this time. She states that it only lasted a few days and ever since has not been bothersome. She now has a small dark spot on her nail that she has some concerns about. She denies any pain at this time.         The following portions of the patient's history were reviewed and updated as appropriate: allergies, current medications, past family history, past medical history, past social  "history, past surgical history and problem list.    Review of Systems   Constitutional: Negative.    HENT: Negative.     Respiratory: Negative.     Cardiovascular: Negative.    Gastrointestinal: Negative.    Musculoskeletal: Negative.    Skin: Negative.    Neurological: Negative.          OBJECTIVE      Ht 5' 4\" (1.626 m)   Wt 90.3 kg (199 lb)   LMP 02/06/2025 (Approximate)   BMI 34.16 kg/m²        Physical Exam  Constitutional:       Appearance: Normal appearance.   HENT:      Head: Normocephalic and atraumatic.   Eyes:      General:         Right eye: No discharge.         Left eye: No discharge.   Cardiovascular:      Rate and Rhythm: Normal rate and regular rhythm.      Pulses:           Dorsalis pedis pulses are 2+ on the right side and 2+ on the left side.        Posterior tibial pulses are 2+ on the right side and 2+ on the left side.   Pulmonary:      Effort: Pulmonary effort is normal.      Breath sounds: Normal breath sounds.   Musculoskeletal:      Comments: No pain on palpation to the bilateral hallux noted today.   Skin:     General: Skin is warm.      Capillary Refill: Capillary refill takes less than 2 seconds.   Neurological:      Sensory: Sensation is intact. No sensory deficit.               "

## 2025-02-24 NOTE — TELEPHONE ENCOUNTER
Spoke with patient- scheduled appointment with Dr. Carney, you did not have anything available until April. She did ask about tylenol, and why she had to take it before had, if it is just for discomfort or pain? Please advise.

## 2025-02-27 ENCOUNTER — OFFICE VISIT (OUTPATIENT)
Dept: OBGYN CLINIC | Facility: CLINIC | Age: 25
End: 2025-02-27
Payer: COMMERCIAL

## 2025-02-27 ENCOUNTER — APPOINTMENT (OUTPATIENT)
Dept: RADIOLOGY | Facility: CLINIC | Age: 25
End: 2025-02-27
Payer: COMMERCIAL

## 2025-02-27 VITALS — WEIGHT: 200 LBS | BODY MASS INDEX: 34.15 KG/M2 | HEIGHT: 64 IN

## 2025-02-27 DIAGNOSIS — M23.91 INTERNAL DERANGEMENT OF RIGHT KNEE: ICD-10-CM

## 2025-02-27 DIAGNOSIS — M25.561 ACUTE PAIN OF RIGHT KNEE: ICD-10-CM

## 2025-02-27 DIAGNOSIS — Z01.89 ENCOUNTER FOR LOWER EXTREMITY COMPARISON IMAGING STUDY: ICD-10-CM

## 2025-02-27 DIAGNOSIS — Z01.89 ENCOUNTER FOR LOWER EXTREMITY COMPARISON IMAGING STUDY: Primary | ICD-10-CM

## 2025-02-27 PROCEDURE — 73564 X-RAY EXAM KNEE 4 OR MORE: CPT

## 2025-02-27 PROCEDURE — 73562 X-RAY EXAM OF KNEE 3: CPT

## 2025-02-27 PROCEDURE — 99204 OFFICE O/P NEW MOD 45 MIN: CPT | Performed by: ORTHOPAEDIC SURGERY

## 2025-02-27 RX ORDER — ALPRAZOLAM 1 MG/1
1 TABLET ORAL ONCE
Qty: 1 TABLET | Refills: 0 | Status: SHIPPED | OUTPATIENT
Start: 2025-02-27 | End: 2025-02-27

## 2025-02-27 NOTE — PROGRESS NOTES
Patient Name: Mira Jameson      : 2000       MRN: [id number]   Encounter Provider: Jones Chin MD   Encounter Date: 25  Encounter department: [facility] [place]         Assessment & Plan  Acute pain of right knee    Orders:    Ambulatory Referral to Orthopedic Surgery    XR knee 4+ vw right injury; Future    MRI knee right  wo contrast; Future    Encounter for lower extremity comparison imaging study    Orders:    XR knee 3 vw left non injury; Future    ALPRAZolam (XANAX) 1 mg tablet; Take 1 tablet (1 mg total) by mouth once for 1 dose 20-30 minutes prior to scheduled MRI.    Internal derangement of right knee    Orders:    MRI knee right  wo contrast; Future       Plan:  Mira is a pleasant 24 y.o. female who presents for initial consultation of right knee pain.  She has a previous history of traumatic injury while skateboarding.  Upon review of available imaging and clinical evaluation, we discussed her pain and symptoms are most consistent with a meniscus injury as well as possible MCL injury.  There is also a side-to-side difference in Lachman testing raising the concern for possible ACL injury.  Due to these concerns, I recommend she obtain an MRI for further evaluation. She was agreeable to the plan and an order was placed at today's visit. A prescription was sent to her pharmacy due to claustrophobia concerns. She should take the dose 20-30 minutes prior to the procedure. She may continue with Ibuprofen or Tylenol as needed for pain relief. I will see her back upon results for MRI review.     Follow-up:  Return for MRI review upon results .     _____________________________________________________  CHIEF COMPLAINT:  Chief Complaint   Patient presents with    Right Knee - Pain     No recent trauma or injury.         SUBJECTIVE:  Mira Jameson is a 24 y.o. female who presents for initial consultation of right knee pain, referred to me by Dr. Nusrat DPM.  She notes a skateboarding accident multiple years ago in which her right knee began. She notes the current pain is diffuse along the anterior, medial, and lateral knee. Pain is aggravated with weight bearing related activities including walking for extended periods of time, climbing stairs, and running. She also experiences deep knee pain while sitting cross legged. She denies mechanical symptoms including locking and catching at this time. She is able to sleep through the night pain free, however, she notes restless leg syndrome ongoing for the past few months. She experiences distal numbness and tingling at night that subsides upon waking. No further trauma or injury.        Surgical History:  None    PAST MEDICAL HISTORY:  Past Medical History:   Diagnosis Date    Asthma     Bipolar and related disorder (HCC)     Eczema     Schizo affective schizophrenia (HCC)     provided by friends/family       PAST SURGICAL HISTORY:  Past Surgical History:   Procedure Laterality Date    APPENDECTOMY      APPENDECTOMY LAPAROSCOPIC N/A 01/07/2025    Procedure: APPENDECTOMY LAPAROSCOPIC;  Surgeon: Deonte Hart MD;  Location: [facility];  Service: General       FAMILY HISTORY:  Family History   Adopted: Yes   Problem Relation Age of Onset    Diabetes Mother     Bipolar disorder Mother     Schizoaffective Disorder  Mother        SOCIAL HISTORY:  Social History     Tobacco Use    Smoking status: Former     Current packs/day: 0.25     Types: Cigarettes    Smokeless tobacco: Never   Vaping Use    Vaping status: Former    Substances: Nicotine, Flavoring   Substance Use Topics    Alcohol use: Not Currently    Drug use: Not Currently     Comment: former marijuana use       MEDICATIONS:    Current Outpatient Medications:     albuterol (ACCUNEB) 1.25 MG/3ML nebulizer solution, Take 3 mL (1.25 mg total) by nebulization every 6 (six) hours as needed for wheezing or shortness of breath, Disp: 3 mL, Rfl: 5    albuterol (PROVENTIL  "HFA,VENTOLIN HFA) 90 mcg/act inhaler, Inhale 2 puffs every 6 (six) hours as needed for wheezing, Disp: 18 g, Rfl: 3    ALPRAZolam (XANAX) 1 mg tablet, Take 1 tablet (1 mg total) by mouth once for 1 dose 20-30 minutes prior to scheduled MRI., Disp: 1 tablet, Rfl: 0    metFORMIN (GLUCOPHAGE) 500 mg tablet, Take 1 tablet (500 mg total) by mouth in the morning, Disp: 30 tablet, Rfl: 1    pantoprazole (PROTONIX) 40 mg tablet, take 1 tablet by mouth once daily, Disp: 90 tablet, Rfl: 1    polyethylene glycol (MIRALAX) 17 g packet, Take 17 g by mouth daily, Disp: 238 g, Rfl: 0    psyllium (METAMUCIL) 58.6 % packet, Take 1 packet by mouth daily, Disp: , Rfl:     ALLERGIES:  Allergies   Allergen Reactions    Cat Dander Shortness Of Breath and Itching    Shellfish-Derived Products - Food Allergy Throat Swelling       LABS:  HgA1c:   Lab Results   Component Value Date    HGBA1C 6.1 (H) 01/09/2025     BMP:   Lab Results   Component Value Date    CALCIUM 9.6 01/20/2025    K 4.4 01/20/2025    CO2 25 01/20/2025     01/20/2025    BUN 11 01/20/2025    CREATININE 0.75 01/20/2025     CBC: No components found for: \"CBC\"    _____________________________________________________  Review of systems: ROS is negative other than that noted in the HPI.  Constitutional: Negative for fatigue and fever.   HENT: Negative for sore throat.    Respiratory: Negative for shortness of breath.    Cardiovascular: Negative for chest pain.   Gastrointestinal: Negative for abdominal pain.   Endocrine: Negative for cold intolerance and heat intolerance.   Genitourinary: Negative for flank pain.   Musculoskeletal: Negative for back pain.   Skin: Negative for rash.   Allergic/Immunologic: Negative for immunocompromised state.   Neurological: Negative for dizziness.   Psychiatric/Behavioral: Negative for agitation.     Knee Exam:   No significant skin lesions or deformity  Range of motion from -5 to beyond 135  Pain with forced deep flexion  Medial and " lateral joint line tenderness   Patella tenderness  Knee is stable to varus stress, Lachman, and posterior drawer.    Neurovascularly intact distally  + Isabelle  Swelling in the popliteal fossa consistent with a Baker's cyst   Laxity with valgus stress Grade 1-2  Side to side difference with Lachman. Stable end feel       Physical exam:  General/Constitutional: NAD, well developed, well nourished  HENT: Normocephalic, atraumatic  CV: Intact distal pulses, regular rate  Resp: No respiratory distress or labored breathing  Abdomen: soft, nondistended   Lymphatic: No lymphadenopathy palpated  Neuro: Alert and Oriented x 3, no focal deficits  Psych: Normal mood, normal affect  Skin: Warm, dry, no rashes, no erythema  _____________________________________________________  STUDIES REVIEWED:  X-rays of the right knee reviewed and interpreted today. These show no acute fracture or dislocation. No significant degenerative changes. No lytic or blastic osseous lesion.       PROCEDURES PERFORMED:  No Procedures performed today    Scribe Attestation      I,:  Ben Mijares am acting as a scribe while in the presence of the attending physician.:       I,:  Jones Chin MD personally performed the services described in this documentation    as scribed in my presence.:

## 2025-03-06 ENCOUNTER — PROCEDURE VISIT (OUTPATIENT)
Age: 25
End: 2025-03-06

## 2025-03-06 VITALS
WEIGHT: 197 LBS | HEIGHT: 64 IN | HEART RATE: 82 BPM | DIASTOLIC BLOOD PRESSURE: 88 MMHG | OXYGEN SATURATION: 99 % | BODY MASS INDEX: 33.63 KG/M2 | RESPIRATION RATE: 16 BRPM | TEMPERATURE: 98 F | SYSTOLIC BLOOD PRESSURE: 134 MMHG

## 2025-03-06 DIAGNOSIS — Z23 ENCOUNTER FOR IMMUNIZATION: ICD-10-CM

## 2025-03-06 DIAGNOSIS — R87.610 ASCUS WITH POSITIVE HIGH RISK HPV CERVICAL: ICD-10-CM

## 2025-03-06 DIAGNOSIS — Z98.890 STATUS POST COLPOSCOPY: Primary | ICD-10-CM

## 2025-03-06 DIAGNOSIS — Z11.3 SCREENING FOR STDS (SEXUALLY TRANSMITTED DISEASES): ICD-10-CM

## 2025-03-06 DIAGNOSIS — R87.810 ASCUS WITH POSITIVE HIGH RISK HPV CERVICAL: ICD-10-CM

## 2025-03-06 PROCEDURE — 88344 IMHCHEM/IMCYTCHM EA MLT ANTB: CPT | Performed by: PATHOLOGY

## 2025-03-06 PROCEDURE — 88305 TISSUE EXAM BY PATHOLOGIST: CPT | Performed by: PATHOLOGY

## 2025-03-06 PROCEDURE — 57454 BX/CURETT OF CERVIX W/SCOPE: CPT | Performed by: OBSTETRICS & GYNECOLOGY

## 2025-03-06 NOTE — PROGRESS NOTES
Colposcopy    Date/Time: 3/6/2025 8:00 AM    Performed by: Colten Mosley MD  Authorized by: Colten Mosley MD    Other Assisting Provider: Yes (comment) (Dr.Bhavana Negron)    Verbal consent obtained?: Yes    Written consent obtained?: Yes    Risks and benefits: Risks, benefits and alternatives were discussed    Consent given by:  Patient  Time Out:     Time out: Immediately prior to the procedure a time out was called      Time out performed at:  3/6/2025 8:45 AM  Patient states understanding of procedure being performed: Yes    Patient's understanding of procedure matches consent: Yes    Procedure consent matches procedure scheduled: Yes    Relevant documents present and verified: Yes    Test results available and properly labeled: Yes    Site marked: Yes    Patient identity confirmed:  Verbally with patient  Pre-procedure:     Prepped with: acetic acid    Indication:     Indications: ASCUS with High Risk HPV.  Procedure:     Procedure: Colposcopy w/ cervical biopsy and ECC      Under satisfactory analgesia the patient was prepped and draped in the dorsal lithotomy position: yes      Lynn speculum was placed in the vagina: yes      Cervix was cleansed with betadine: yes      Under colposcopic examination the transition zone was seen in entirety: yes      Intracervical block was performed: no      Endocervix was curetted using a Kevorkian curette: yes      Cervical biopsy performed with a cervical biopsy punch: yes      Monsel's solution was applied: no      Allis/Tenaculum removed and cervix homostatic: no (cauterized with silver nitrate)      Specimen(s) to pathology: yes    Post-procedure:     Findings: Bleeding      Impression comment:  HPV changes vs. KARI 1    Patient tolerance of procedure:  Tolerated well, no immediate complications  Comments:      Patient presented with pap smear of HPV with High Risk HPV. Patients consent was taken.       Physical Exam  Genitourinary:           Comments: Red ; SCJ  Yellow  ; Flat mild acetowhite changes

## 2025-03-07 ENCOUNTER — HOSPITAL ENCOUNTER (OUTPATIENT)
Dept: RADIOLOGY | Facility: HOSPITAL | Age: 25
Discharge: HOME | End: 2025-03-07
Attending: ORTHOPAEDIC SURGERY
Payer: COMMERCIAL

## 2025-03-07 DIAGNOSIS — M25.561 ACUTE PAIN OF RIGHT KNEE: ICD-10-CM

## 2025-03-07 DIAGNOSIS — M23.91 INTERNAL DERANGEMENT OF RIGHT KNEE: ICD-10-CM

## 2025-03-07 PROCEDURE — 73721 MRI JNT OF LWR EXTRE W/O DYE: CPT

## 2025-03-09 ENCOUNTER — RESULTS FOLLOW-UP (OUTPATIENT)
Age: 25
End: 2025-03-09

## 2025-03-10 ENCOUNTER — TELEPHONE (OUTPATIENT)
Age: 25
End: 2025-03-10

## 2025-03-11 ENCOUNTER — TELEPHONE (OUTPATIENT)
Age: 25
End: 2025-03-11

## 2025-03-11 ENCOUNTER — TELEPHONE (OUTPATIENT)
Dept: OBGYN CLINIC | Facility: CLINIC | Age: 25
End: 2025-03-11

## 2025-03-11 ENCOUNTER — PROCEDURE VISIT (OUTPATIENT)
Age: 25
End: 2025-03-11

## 2025-03-11 VITALS
HEIGHT: 64 IN | SYSTOLIC BLOOD PRESSURE: 119 MMHG | OXYGEN SATURATION: 98 % | HEART RATE: 87 BPM | BODY MASS INDEX: 34.31 KG/M2 | WEIGHT: 201 LBS | DIASTOLIC BLOOD PRESSURE: 78 MMHG

## 2025-03-11 DIAGNOSIS — Z23 ENCOUNTER FOR IMMUNIZATION: Primary | ICD-10-CM

## 2025-03-11 DIAGNOSIS — H60.503 ACUTE OTITIS EXTERNA OF BOTH EARS, UNSPECIFIED TYPE: ICD-10-CM

## 2025-03-11 DIAGNOSIS — J30.2 SEASONAL ALLERGIES: ICD-10-CM

## 2025-03-11 PROCEDURE — 90471 IMMUNIZATION ADMIN: CPT

## 2025-03-11 PROCEDURE — 90651 9VHPV VACCINE 2/3 DOSE IM: CPT

## 2025-03-11 PROCEDURE — 99214 OFFICE O/P EST MOD 30 MIN: CPT | Performed by: FAMILY MEDICINE

## 2025-03-11 RX ORDER — LORATADINE 10 MG/1
10 TABLET ORAL DAILY
Qty: 30 TABLET | Refills: 2 | Status: SHIPPED | OUTPATIENT
Start: 2025-03-11

## 2025-03-11 RX ORDER — FLUTICASONE PROPIONATE 50 MCG
1 SPRAY, SUSPENSION (ML) NASAL DAILY
Qty: 16 G | Refills: 2 | Status: SHIPPED | OUTPATIENT
Start: 2025-03-11

## 2025-03-11 RX ORDER — CIPROFLOXACIN AND DEXAMETHASONE 3; 1 MG/ML; MG/ML
4 SUSPENSION/ DROPS AURICULAR (OTIC) 2 TIMES DAILY
Qty: 7.5 ML | Refills: 0 | Status: SHIPPED | OUTPATIENT
Start: 2025-03-11 | End: 2025-03-16

## 2025-03-11 NOTE — TELEPHONE ENCOUNTER
Left message for patient to say dr garcia is being pulled into OR tomorrow 3/12 and we have to reschedule.  Asked they call back and that appt for that day is cancelled

## 2025-03-11 NOTE — PROGRESS NOTES
Name: Mira Jameson      : 2000      MRN: [id number]  Encounter Provider: Florence Juarez MD  Encounter Date: 3/11/2025   Encounter department: [facility]  :  Assessment & Plan  Encounter for immunization  1 mo since first dose, will get second dose today and third dose in 4 months   Discussed HPV vaccine in detail with patient and her partner  Orders:    HPV VACCINE 9 VALENT IM    Acute otitis externa of both ears, unspecified type  Bilateral erythema with scant blood in right canal due to trauma from qtips. At risk for infection, does have tenderness on otoscope exam, treat with ciprodex x 5 d and educated on avoiding qtips. Can use debrox or hydrogen peroxide in ears at home once drops completed to keep wax loose.   Orders:    ciprofloxacin-dexamethasone (CIPRODEX) otic suspension; Administer 4 drops into both ears 2 (two) times a day for 5 days    Seasonal allergies  Chronic, worse this past week likely with weather changes   Educated on proper flonase technique  Start claritin daily   Orders:    fluticasone (FLONASE) 50 mcg/act nasal spray; 1 spray into each nostril daily    loratadine (CLARITIN) 10 mg tablet; Take 1 tablet (10 mg total) by mouth daily           History of Present Illness   This is a very pleasant 24 y.o. female who presents to the clinic for ear pain and subjective impacted cerumen.  Patient's medical conditions are stable unless noted otherwise above.  Patient has not had any recent hospitalizations, or medical emergencies since last visit.  Patient has no further complaints other than what is mentioned in the ROS.      Review of Systems   Constitutional:  Positive for fever (subjective). Negative for activity change, appetite change and fatigue.   HENT:  Positive for congestion, ear discharge (scant amt of blood yesterday after using qtip), ear pain, postnasal drip, rhinorrhea and sore throat. Negative for nosebleeds, sinus pressure,  "sneezing, tinnitus and trouble swallowing.    Eyes:  Negative for visual disturbance.   Respiratory:  Negative for apnea, cough and shortness of breath.    Cardiovascular:  Negative for chest pain, palpitations and leg swelling.   Gastrointestinal:  Negative for abdominal pain, constipation and diarrhea.   Genitourinary:  Negative for difficulty urinating.   Musculoskeletal:  Negative for arthralgias.   Neurological:  Negative for dizziness, light-headedness and headaches.   Psychiatric/Behavioral:  Negative for agitation.        Objective   /78 (BP Location: Left arm, Patient Position: Sitting, Cuff Size: Standard)   Pulse 87   Ht 5' 4\" (1.626 m)   Wt 91.2 kg (201 lb)   LMP 02/06/2025 (Approximate)   SpO2 98%   BMI 34.50 kg/m²      Physical Exam  Constitutional:       General: She is not in acute distress.     Appearance: Normal appearance.   HENT:      Head: Normocephalic and atraumatic.      Right Ear: Hearing normal. Tenderness present. No drainage or swelling. No middle ear effusion. There is no impacted cerumen. No foreign body. No mastoid tenderness. No PE tube.      Left Ear: Hearing normal. Tenderness present. No drainage or swelling.  No middle ear effusion. There is no impacted cerumen. No foreign body. No mastoid tenderness. No PE tube.      Ears:      Comments: Erythema bilateral ear canals, pinna erythematous on left ear     Nose: Congestion and rhinorrhea present.      Mouth/Throat:      Pharynx: Posterior oropharyngeal erythema and postnasal drip present. No pharyngeal swelling, oropharyngeal exudate or uvula swelling.      Tonsils: No tonsillar exudate or tonsillar abscesses. 0 on the right. 1+ on the left.   Cardiovascular:      Rate and Rhythm: Normal rate and regular rhythm.   Pulmonary:      Effort: Pulmonary effort is normal. No respiratory distress.   Musculoskeletal:      Cervical back: Normal range of motion.   Neurological:      Mental Status: She is alert.   Psychiatric:        "  Mood and Affect: Mood normal.         Behavior: Behavior normal.       Florence Juarez MD   03/11/25  10:15 AM

## 2025-03-12 ENCOUNTER — RESULTS FOLLOW-UP (OUTPATIENT)
Dept: OTHER | Facility: HOSPITAL | Age: 25
End: 2025-03-12

## 2025-03-12 PROCEDURE — 88305 TISSUE EXAM BY PATHOLOGIST: CPT | Performed by: PATHOLOGY

## 2025-03-12 PROCEDURE — 88344 IMHCHEM/IMCYTCHM EA MLT ANTB: CPT | Performed by: PATHOLOGY

## 2025-03-14 ENCOUNTER — TELEPHONE (OUTPATIENT)
Dept: OBGYN CLINIC | Facility: CLINIC | Age: 25
End: 2025-03-14

## 2025-03-14 NOTE — TELEPHONE ENCOUNTER
m for patient to call back to reschedule appt with Dr. Chin since he had to leave for the afternoon

## 2025-03-17 ENCOUNTER — OFFICE VISIT (OUTPATIENT)
Age: 25
End: 2025-03-17

## 2025-03-17 VITALS
WEIGHT: 201 LBS | OXYGEN SATURATION: 97 % | HEART RATE: 78 BPM | BODY MASS INDEX: 34.31 KG/M2 | TEMPERATURE: 98.2 F | HEIGHT: 64 IN | DIASTOLIC BLOOD PRESSURE: 87 MMHG | SYSTOLIC BLOOD PRESSURE: 129 MMHG

## 2025-03-17 DIAGNOSIS — F32.A DEPRESSION, UNSPECIFIED DEPRESSION TYPE: ICD-10-CM

## 2025-03-17 DIAGNOSIS — K59.00 CONSTIPATION, UNSPECIFIED CONSTIPATION TYPE: ICD-10-CM

## 2025-03-17 DIAGNOSIS — J45.40 MODERATE PERSISTENT ASTHMA WITHOUT COMPLICATION: ICD-10-CM

## 2025-03-17 DIAGNOSIS — E66.811 CLASS 1 OBESITY WITH BODY MASS INDEX (BMI) OF 34.0 TO 34.9 IN ADULT, UNSPECIFIED OBESITY TYPE, UNSPECIFIED WHETHER SERIOUS COMORBIDITY PRESENT: ICD-10-CM

## 2025-03-17 DIAGNOSIS — Z87.42 HX OF ABNORMAL CERVICAL PAP SMEAR: ICD-10-CM

## 2025-03-17 DIAGNOSIS — R73.03 PREDIABETES: Primary | ICD-10-CM

## 2025-03-17 DIAGNOSIS — E28.2 PCOS (POLYCYSTIC OVARIAN SYNDROME): ICD-10-CM

## 2025-03-17 PROBLEM — Z09 SURGICAL FOLLOW-UP CARE: Status: RESOLVED | Noted: 2025-01-22 | Resolved: 2025-03-17

## 2025-03-17 PROCEDURE — 99214 OFFICE O/P EST MOD 30 MIN: CPT | Performed by: FAMILY MEDICINE

## 2025-03-17 RX ORDER — BUDESONIDE AND FORMOTEROL FUMARATE DIHYDRATE 160; 4.5 UG/1; UG/1
2 AEROSOL RESPIRATORY (INHALATION) 2 TIMES DAILY
Qty: 10.2 G | Refills: 5 | Status: SHIPPED | OUTPATIENT
Start: 2025-03-17

## 2025-03-17 NOTE — PATIENT INSTRUCTIONS
"Patient Education     Constipation in adults   The Basics   Written by the doctors and editors at [facility]   What is constipation? -- Constipation is a common problem that makes it hard to have bowel movements. Your bowel movements might be:   Too hard   Too small   Hard to get out   Happening fewer than 3 times a week  What causes constipation? -- Constipation can be caused by:   Side effects of some medicines   Poor diet   Diseases of the digestive system (figure 1)  What other symptoms should I watch for? -- These symptoms could signal a more serious problem:   Blood in the toilet or on the toilet paper after having a bowel movement   Fever   Weight loss   Feeling weak  It could also be a sign of a problem if you have new constipation without a change in your medicines or diet, and have never had constipation in the past.  Is there anything I can do on my own to get rid of constipation? -- Yes. Try these steps:   Eat foods that have a lot of fiber. Good choices are fruits, vegetables, prune juice, and cereal (table 1).   Drink plenty of water and other fluids.   When you feel the need to go to the bathroom, go to the bathroom. Don't hold it.   Take laxatives. These are medicines that help make bowel movements easier to get out. Some are pills that you swallow. Other medicines go into the rectum. These are called \"suppositories\" or \"enemas.\"  Should I see a doctor or nurse? -- See your doctor or nurse if:   Your symptoms are new or not normal for you.   You do not have a bowel movement for a few days.   The problem comes and goes, but lasts for longer than 3 weeks.   You are in a lot of pain.   You have other symptoms that also worry you (for example, bleeding, weakness, weight loss, or fever).   Other people in your family have had colorectal cancer or inflammatory bowel disease.  Should I have any tests? -- Your doctor or nurse will decide which tests you should have based on your age, other symptoms, and " "individual situation. There are lots of tests, but you might not need any.  Here are the most common tests doctors use to find the cause of constipation:   Rectal exam - Your doctor will look at the outside of your anus. They will also use a finger to feel inside the opening.   Sigmoidoscopy or colonoscopy - For these tests, the doctor puts a thin tube into your anus. Then, they advance the tube into your large intestine. The large intestine is also called the colon. The tube has a camera attached to it, so the doctor can look inside your intestines. During these tests, the doctor can also take samples of tissue to look at under a microscope (figure 2).   X-rays, CT scan, or MRI - These create images of the inside of your body.   Manometry studies - Manometry lets the doctor measure the pressure inside of the rectum at various points. It can help the doctor find out if the muscles that control bowel movements are working right. The test also shows whether the person's rectum can feel normally.  How is constipation treated? -- It depends on what is causing your constipation. First, your doctor will ask you to try eating more fiber and drinking more water. If that doesn't help, your doctor might suggest:   Medicines that you swallow or put in your rectum   Changing the medicines you are taking for other conditions   A treatment called an \"enema\" - Enemas can be just water, or they can contain medicine to help with constipation.   Biofeedback - This is a technique that teaches you to relax your muscles so you can let go and push bowel movements out.  Can constipation be prevented? -- You can reduce your chances of getting constipation again if you:   Eat a high-fiber diet (table 1).   Drink water and other fluids during the day,   Go to the bathroom at regular times every day.  All topics are updated as new evidence becomes available and our peer review process is complete.  This topic retrieved from Alise [employer] on: Feb " "26, 2024.  Topic 66851 Version 11.0  Release: 32.2.4 - C32.56  © 2024 UpToDate, Inc. and/or its affiliates. All rights reserved.  figure 1: Digestive system     This drawing shows the organs in the body that process food. Together, these organs are called the \"digestive system\" or \"digestive tract.\" As food travels through this system, the body absorbs nutrients and water.  Graphic 74232 Version 5.0  table 1: Amount of fiber in different foods  Food  Serving  Grams of fiber    Fruits    Apple (with skin) 1 medium apple 4.4   Banana 1 medium banana 3.1   Oranges 1 orange 3.1   Prunes 1 cup, pitted 12.4   Juices    Apple, unsweetened, with added ascorbic acid 1 cup 0.5   Grapefruit, white, canned, sweetened 1 cup 0.2   Grape, unsweetened, with added ascorbic acid 1 cup 0.5   Orange 1 cup 0.7   Vegetables    Cooked   Green beans 1 cup 4.0   Carrots 1/2 cup sliced 2.3   Peas 1 cup 8.8   Potato (baked, with skin) 1 medium potato 3.8   Raw   Cucumber (with peel) 1 cucumber 1.5   Lettuce 1 cup shredded 0.5   Tomato 1 medium tomato 1.5   Spinach 1 cup 0.7   Legumes   Baked beans, canned, no salt added 1 cup 13.9   Kidney beans, canned 1 cup 13.6   Lima beans, canned 1 cup 11.6   Lentils, boiled 1 cup 15.6   Breads, pastas, flours    Bran muffins 1 medium muffin 5.2   Oatmeal, cooked 1 cup 4.0   White bread 1 slice 0.6   Whole-wheat bread 1 slice 1.9   Pasta and rice, cooked   Macaroni 1 cup 2.5   Rice, brown 1 cup 3.5   Rice, white 1 cup 0.6   Spaghetti (regular) 1 cup 2.5   Nuts    Almonds 1/2 cup 8.7   Peanuts 1/2 cup 7.9   To learn how much fiber and other nutrients are in different foods, visit the [place] Department of Agriculture (USDA) FoodData Central website.  Graphic 69149 Version 6.0  figure 2: Colonoscopy     During a colonoscopy, you lie on your side and the doctor puts a thin tube with a camera into your anus (from behind). Then, the doctor advances the tube into the rectum and colon. The camera sends " pictures from inside your colon to a television screen.  Graphic 33284 Version 8.0  Consumer Information Use and Disclaimer   Disclaimer: This generalized information is a limited summary of diagnosis, treatment, and/or medication information. It is not meant to be comprehensive and should be used as a tool to help the user understand and/or assess potential diagnostic and treatment options. It does NOT include all information about conditions, treatments, medications, side effects, or risks that may apply to a specific patient. It is not intended to be medical advice or a substitute for the medical advice, diagnosis, or treatment of a health care provider based on the health care provider's examination and assessment of a patient's specific and unique circumstances. Patients must speak with a health care provider for complete information about their health, medical questions, and treatment options, including any risks or benefits regarding use of medications. This information does not endorse any treatments or medications as safe, effective, or approved for treating a specific patient. UpToDate, Inc. and its affiliates disclaim any warranty or liability relating to this information or the use thereof.The use of this information is governed by the Terms of Use, available at https://www.woltersGreenVoltsuwer.com/en/know/clinical-effectiveness-terms. 2024© UpToDate, Inc. and its affiliates and/or licensors. All rights reserved.  Copyright   © 2024 UpToDate, Inc. and/or its affiliates. All rights reserved.

## 2025-03-17 NOTE — ASSESSMENT & PLAN NOTE
Meets [place] Criteria for PCOS based on oligomenorrhea and clinical signs of hyperandrogenism such as hirsutism. Pt also has elevated HbA1c of 6.1 and depression. Labwork reviewed  - started on metformin 500 mg daily, pt tolerating well   - continue lifestyle management, diet and weight loss  - pt declined hormonal contraceptive at this time to regularize menstrual cycle   - pt wants to lose weight prior to attempting pregnancy. Declines current referral to fertility management as she is not interested in pregnancy at this time and is still young. She has not been able to get pregnant over the past year however she does not her and her  use protection often. Once pt has lost weight if menstrual is still not regulated and still not conceiving, would then further discuss options such as clomiphene or hormonal treatment and referral to [facility]     Orders:    metFORMIN (GLUCOPHAGE) 500 mg tablet; Take 1 tablet (500 mg total) by mouth in the morning

## 2025-03-17 NOTE — PROGRESS NOTES
Name: Mira Jameson      : 2000      MRN: [id number]  Encounter Provider: Celena Mcgill MD  Encounter Date: 3/17/2025   Encounter department: [facility]  :  Assessment & Plan  Prediabetes  2025 HbA1c at 6.5   Pt started on metformin 500 mg daily due to concomitant PCOS   Pt reports side effect of diarrhea for the first few days but this has resolved.   Lifestyle management, nutrition and exercise counseling   F/u annual physical     Orders:    metFORMIN (GLUCOPHAGE) 500 mg tablet; Take 1 tablet (500 mg total) by mouth in the morning    PCOS (polycystic ovarian syndrome)  Meets [place] Criteria for PCOS based on oligomenorrhea and clinical signs of hyperandrogenism such as hirsutism. Pt also has elevated HbA1c of 6.1 and depression. Labwork reviewed  - started on metformin 500 mg daily, pt tolerating well   - continue lifestyle management, diet and weight loss  - pt declined hormonal contraceptive at this time to regularize menstrual cycle   - pt wants to lose weight prior to attempting pregnancy. Declines current referral to fertility management as she is not interested in pregnancy at this time and is still young. She has not been able to get pregnant over the past year however she does not her and her  use protection often. Once pt has lost weight if menstrual is still not regulated and still not conceiving, would then further discuss options such as clomiphene or hormonal treatment and referral to [facility]     Orders:    metFORMIN (GLUCOPHAGE) 500 mg tablet; Take 1 tablet (500 mg total) by mouth in the morning    Moderate persistent asthma without complication  Asthma Current Disease Severity  The patient is having daytime symptoms more than 2 days per week but not daily.   The patient is having nighttime symptoms often 7 times per week.  The patient is using short-acting beta agonists for symptom control more than 2 days per week but not more than once a  day.  They have exacerbations requiring oral systemic corticosteroids 0 times per year.   Current limitations in activity from asthma:  HIRSCH with exercise and gym . Triggered by allergies including cats and environmental     Pt originally only taking albuterol prn.   Due to severity will start on maintenance inhaler ICS-LABA and continue CRISTIAN prn. Advised to complete PFT's   Avoid allergic triggers. Continue allergie medications and flonase     Orders:    Complete PFT with post bronchodilator; Future    budesonide-formoterol (SYMBICORT) 160-4.5 mcg/act inhaler; Inhale 2 puffs 2 (two) times a day Rinse mouth after use.    Depression, unspecified depression type  Pt reported diagnosis of bipolar, schizoaffective d/o and ADHD. Pt stopped taking medications including abilify and trazodone as she lacked insurance. Pt reports not reestablishing with psychiatry and plan to start psych meds soon, but they are working on official diagnosis. She is worried about effects of psych meds on weight and hormones. Advised that many medications can have negative side effect of metabolic syndrome and advised to discuss with psychiatry to pick most appropriate option          Class 1 obesity with body mass index (BMI) of 34.0 to 34.9 in adult, unspecified obesity type, unspecified whether serious comorbidity present  BMI 34.50, related to PCOS   Lifestyle modification, extensive nutrition and exercise counseling  Consider referral to nutritionist or weight management if pt is interested   Recently started on metformin   Pt is motivated to lose weight and has encouragement and support from    Pt also with mild snoring but declines referral to sleep medicine at this time for NEVIN dx, would like to work on losing weight first              Constipation, unspecified constipation type  Reports chronic constipation requiring stimulant laxatives. Reports miralax does not help. May be related to PCOS  Advise increased hydration with water    Advise increased fiber intake. Pt does take metamucil   Can trial natural remedies/fruit juices when needed to avoid overtaking stimulant laxatives   Work on PCOS treatment and weight management        Hx of abnormal cervical Pap smear  First pap smear 2/2025 with ASCUS and positive HPV other HR  Colposcopy completed with biopsy with benign cervical squamous mucosa, negative for dysplasia, mild chronic cervicitis   Discussed with pt, education provided  Pt reports sexually active and monogamous with   Is not concerned about STI and does not want testing for GC/CT   Previous molecular vaginal panel and trich/mycoplasma negative  Will need repeat pap smear in 1 year               History of Present Illness   HPI  25 yo female presenting for f/u PCOS. Presenting with .    Would like to discuss PCOS and also colposcopy done and would like to discuss results on that. Has been experiencing hot flashes at night and sometimes even just walking up/down the steps- feels overheated even when it is not hot in the room. Wants to know when her 3rd dose of HPV should be given.     Practices safe sex with . Unsure where HPV came from. Questions about hpv and colpo.   Taking metformin. Thinking about cutting out sugars and carbs. Goal weight 110 lbs, when met  that is how much she weighed. She reports she gained weight in the relationship when she became comfortable and he made her feel comfortable to eat in front of him.   Constipation, uses stimulant laxatives. PT does not want to depend on laxatives. Reports miralax does not work. Reports drinks enough water and takes fiber supplements. Sometimes after a few days when using laxatives stool hurts coming out.   Just started going back to gym after  fractured knee. Back in gym,  doing physical therapy.    Asthma is acting up. Gym out of breath and stairs. Asthma attack every 3-4 days. Wakes up in middle of night trouble breathing 2-3 times  "also wakes up to go to bathroom. Snores. Cats allergies. Allergies, irritated throat. Told before tonsils are small and do not need removed. Trouble breathing at night when sleep on back. Taking claritin and flonase. Flonase helps better per pt.   As kid, nebulizer twice a day and inhaler in between, intubated previously, wants to avoid.   Does not want to see sleep medicine for dx apnea. Wants to work on weight first.  says only light snore, not loud obnoxious.  previously saw sleep medicine and he did not sleep well with the sleep test.               Review of Systems   Constitutional:  Negative for chills and fever.   HENT:  Positive for congestion and postnasal drip. Negative for ear pain and sore throat.    Eyes:  Negative for pain and visual disturbance.   Respiratory:  Positive for shortness of breath. Negative for cough.    Cardiovascular:  Negative for chest pain and palpitations.   Gastrointestinal:  Positive for constipation. Negative for abdominal pain and vomiting.   Genitourinary:  Positive for menstrual problem. Negative for dysuria and hematuria.   Musculoskeletal:  Negative for arthralgias and back pain.   Skin:  Negative for color change and rash.   Neurological:  Negative for seizures and syncope.   All other systems reviewed and are negative.      Objective   /87 (BP Location: Left arm, Patient Position: Sitting, Cuff Size: Standard)   Pulse 78   Temp 98.2 °F (36.8 °C)   Ht 5' 4\" (1.626 m)   Wt 91.2 kg (201 lb)   SpO2 97%   BMI 34.50 kg/m²      Physical Exam  Vitals reviewed.   Constitutional:       General: She is not in acute distress.     Appearance: Normal appearance. She is well-developed.   HENT:      Head: Normocephalic and atraumatic.      Right Ear: External ear normal.      Left Ear: External ear normal.      Nose: Nose normal.      Mouth/Throat:      Mouth: Mucous membranes are moist.      Pharynx: Oropharynx is clear. No oropharyngeal exudate or posterior " oropharyngeal erythema.      Comments: Tonsils not enlarged  Eyes:      Extraocular Movements: Extraocular movements intact.      Conjunctiva/sclera: Conjunctivae normal.   Cardiovascular:      Rate and Rhythm: Normal rate and regular rhythm.      Pulses: Normal pulses.      Heart sounds: Normal heart sounds. No murmur heard.  Pulmonary:      Effort: Pulmonary effort is normal. No respiratory distress.      Breath sounds: Normal breath sounds. No wheezing or rales.   Musculoskeletal:         General: No swelling. Normal range of motion.      Cervical back: Neck supple.   Skin:     General: Skin is warm and dry.      Capillary Refill: Capillary refill takes less than 2 seconds.      Findings: No rash.   Neurological:      General: No focal deficit present.      Mental Status: She is alert. Mental status is at baseline.   Psychiatric:         Mood and Affect: Mood normal.         Behavior: Behavior normal.           Celena Mcgill MD  [facility]  Date: 3/17/2025 Time: 9:10 PM

## 2025-03-17 NOTE — ASSESSMENT & PLAN NOTE
1/9/2025 HbA1c at 6.5   Pt started on metformin 500 mg daily due to concomitant PCOS   Pt reports side effect of diarrhea for the first few days but this has resolved.   Lifestyle management, nutrition and exercise counseling   F/u annual physical     Orders:    metFORMIN (GLUCOPHAGE) 500 mg tablet; Take 1 tablet (500 mg total) by mouth in the morning

## 2025-03-17 NOTE — ASSESSMENT & PLAN NOTE
Pt reported diagnosis of bipolar, schizoaffective d/o and ADHD. Pt stopped taking medications including abilify and trazodone as she lacked insurance. Pt reports not reestablishing with psychiatry and plan to start psych meds soon, but they are working on official diagnosis. She is worried about effects of psych meds on weight and hormones. Advised that many medications can have negative side effect of metabolic syndrome and advised to discuss with psychiatry to pick most appropriate option

## 2025-03-17 NOTE — ASSESSMENT & PLAN NOTE
Asthma Current Disease Severity  The patient is having daytime symptoms more than 2 days per week but not daily.   The patient is having nighttime symptoms often 7 times per week.  The patient is using short-acting beta agonists for symptom control more than 2 days per week but not more than once a day.  They have exacerbations requiring oral systemic corticosteroids 0 times per year.   Current limitations in activity from asthma: HIRSCH with exercise and gym. Triggered by allergies including cats and environmental     Pt originally only taking albuterol prn.   Due to severity will start on maintenance inhaler ICS-LABA and continue CRISTIAN prn. Advised to complete PFT's   Avoid allergic triggers. Continue allergie medications and flonase     Orders:    Complete PFT with post bronchodilator; Future    budesonide-formoterol (SYMBICORT) 160-4.5 mcg/act inhaler; Inhale 2 puffs 2 (two) times a day Rinse mouth after use.

## 2025-03-17 NOTE — ASSESSMENT & PLAN NOTE
BMI 34.50, related to PCOS   Lifestyle modification, extensive nutrition and exercise counseling  Consider referral to nutritionist or weight management if pt is interested   Recently started on metformin   Pt is motivated to lose weight and has encouragement and support from    Pt also with mild snoring but declines referral to sleep medicine at this time for NEVIN dx, would like to work on losing weight first

## 2025-03-18 NOTE — ASSESSMENT & PLAN NOTE
First pap smear 2/2025 with ASCUS and positive HPV other HR  Colposcopy completed with biopsy with benign cervical squamous mucosa, negative for dysplasia, mild chronic cervicitis   Discussed with pt, education provided  Pt reports sexually active and monogamous with   Is not concerned about STI and does not want testing for GC/CT   Previous molecular vaginal panel and trich/mycoplasma negative  Will need repeat pap smear in 1 year

## 2025-03-19 ENCOUNTER — TELEPHONE (OUTPATIENT)
Age: 25
End: 2025-03-19

## 2025-03-19 NOTE — TELEPHONE ENCOUNTER
Patient called and requested to speak with PCP in regards to dyslexia test and steps to take    She has some concerns. Her call back is [phone].    Thank you

## 2025-03-20 ENCOUNTER — OFFICE VISIT (OUTPATIENT)
Dept: OBGYN CLINIC | Facility: CLINIC | Age: 25
End: 2025-03-20
Payer: COMMERCIAL

## 2025-03-20 VITALS — WEIGHT: 201 LBS | BODY MASS INDEX: 35.61 KG/M2 | HEIGHT: 63 IN

## 2025-03-20 DIAGNOSIS — M22.2X1 PATELLOFEMORAL PAIN SYNDROME OF RIGHT KNEE: Primary | ICD-10-CM

## 2025-03-20 DIAGNOSIS — F81.9 LEARNING DISABILITY: Primary | ICD-10-CM

## 2025-03-20 PROCEDURE — 99213 OFFICE O/P EST LOW 20 MIN: CPT | Performed by: ORTHOPAEDIC SURGERY

## 2025-03-20 NOTE — PROGRESS NOTES
Called patient to discuss. Pt is concerned about possible learning disability. Had trouble reading literature in the past. Took permit test for 's license and failed x2, once because did not study fully and 2nd did study a good amount but still failed again. Terrible in school with tests and reading and really struggled a lot. [employer] worker saw she failed twice and states they can give specialized test if she has trouble. Specialized needs proof to show learning disability. Pt does notice she stutters and jumbles words and is a slow reader, always been like that as a kid. Now feels like it got worse so she does not read many books nowadays because it takes too long and she feels distanced. Does also struggle speaking sometimes.     Referral for neuropsychology placed for full evaluation. Did discuss as pt needs to be seen in [place] it may take a while.   Other options to consider neurology for an official diagnosis or Haley Hoyos up at [facility] locations:   Britt Hanson PsyD  [phone] [facility]   Neuropsychology Program [facility] [phone]     Forwarded to social work and referral tracking team for further assistance and resources.     Celena Mcgill MD  [facility]  Date: 3/20/2025 Time: 10:18 AM

## 2025-03-20 NOTE — Clinical Note
Patient with concerns of learning disability and dyslexia. Referral for neuropsych placed. Please assist with scheduling and resources thank you

## 2025-03-20 NOTE — PROGRESS NOTES
Patient Name: Mira Jameson      : 2000       MRN: [id number]   Encounter Provider: Jones Chin MD   Encounter Date: 25  Encounter department: [facility]         Assessment & Plan  Patellofemoral pain syndrome of right knee  24-year-old female who presents to the office today for follow up evaluation right knee pain. The patient had a remote skateboarding injury however, no recent injury. The MRI was reviewed in the office today which demonstrates signal in the medial mensicus which could represent a possible tear.  However she has been mild medial symptoms and most of her symptoms are along the patellofemoral joint.  She is not getting any locking or mechanical symptoms.  I would like her to trial a course of conservative treatment prior to considering surgery.  At this time, I would recommend formal therapy for the next 6-8 weeks to work on strengthening. The patient was agreeable to this and a referral was placed for this today. She may follow up in 2-3 months or as needed.  Orders:    Ambulatory Referral to Physical Therapy; Future       _____________________________________________________  CHIEF COMPLAINT:  Chief Complaint   Patient presents with    Right Knee - Follow-up         SUBJECTIVE:  Mira Jameson is a 24 y.o. female who presents to the office today for follow up evaluation of her right knee. At her last visit, a MRI was ordered to evaluate for possible mensicus tear. The patient presents to the office today to review the MRI. The patient states overall, her pain has unchanged. She notes a discomfort to the lateral and medial aspect of her knee. She notes increased pain with steps, running and walking for prolonged periods of time. She states she started exercising. She also noted a pop last night. She denies any locking or catching. She has been taking Ibuprofen and Tylenol as needed.     Knee Surgical History:  None    PAST MEDICAL  HISTORY:  Past Medical History:   Diagnosis Date    Asthma     Bipolar and related disorder (HCC)     Diabetes mellitus (HCC)     Eczema     Schizo affective schizophrenia (HCC)     provided by friends/family       PAST SURGICAL HISTORY:  Past Surgical History:   Procedure Laterality Date    APPENDECTOMY      APPENDECTOMY LAPAROSCOPIC N/A 01/07/2025    Procedure: APPENDECTOMY LAPAROSCOPIC;  Surgeon: Deonte Hart MD;  Location: [facility];  Service: General       FAMILY HISTORY:  Family History   Adopted: Yes   Problem Relation Age of Onset    Diabetes Mother     Bipolar disorder Mother     Schizoaffective Disorder  Mother        SOCIAL HISTORY:  Social History     Tobacco Use    Smoking status: Former     Current packs/day: 0.25     Types: Cigarettes    Smokeless tobacco: Never   Vaping Use    Vaping status: Former    Substances: Nicotine, Flavoring   Substance Use Topics    Alcohol use: Not Currently    Drug use: Not Currently     Comment: former marijuana use       MEDICATIONS:    Current Outpatient Medications:     albuterol (ACCUNEB) 1.25 MG/3ML nebulizer solution, Take 3 mL (1.25 mg total) by nebulization every 6 (six) hours as needed for wheezing or shortness of breath, Disp: 3 mL, Rfl: 5    albuterol (PROVENTIL HFA,VENTOLIN HFA) 90 mcg/act inhaler, Inhale 2 puffs every 6 (six) hours as needed for wheezing, Disp: 18 g, Rfl: 3    budesonide-formoterol (SYMBICORT) 160-4.5 mcg/act inhaler, Inhale 2 puffs 2 (two) times a day Rinse mouth after use., Disp: 10.2 g, Rfl: 5    fluticasone (FLONASE) 50 mcg/act nasal spray, 1 spray into each nostril daily, Disp: 16 g, Rfl: 2    loratadine (CLARITIN) 10 mg tablet, Take 1 tablet (10 mg total) by mouth daily, Disp: 30 tablet, Rfl: 2    metFORMIN (GLUCOPHAGE) 500 mg tablet, Take 1 tablet (500 mg total) by mouth in the morning, Disp: 90 tablet, Rfl: 1    pantoprazole (PROTONIX) 40 mg tablet, take 1 tablet by mouth once daily, Disp: 90 tablet, Rfl: 1    polyethylene glycol  "(MIRALAX) 17 g packet, Take 17 g by mouth daily, Disp: 238 g, Rfl: 0    psyllium (METAMUCIL) 58.6 % packet, Take 1 packet by mouth daily, Disp: , Rfl:     ALPRAZolam (XANAX) 1 mg tablet, Take 1 tablet (1 mg total) by mouth once for 1 dose 20-30 minutes prior to scheduled MRI., Disp: 1 tablet, Rfl: 0    ciprofloxacin-dexamethasone (CIPRODEX) otic suspension, Administer 4 drops into both ears 2 (two) times a day for 5 days, Disp: 7.5 mL, Rfl: 0    ALLERGIES:  Allergies   Allergen Reactions    Cat Dander Shortness Of Breath and Itching    Shellfish-Derived Products - Food Allergy Throat Swelling       LABS:  HgA1c:   Lab Results   Component Value Date    HGBA1C 6.1 (H) 01/09/2025     BMP:   Lab Results   Component Value Date    CALCIUM 9.6 01/20/2025    K 4.4 01/20/2025    CO2 25 01/20/2025     01/20/2025    BUN 11 01/20/2025    CREATININE 0.75 01/20/2025     CBC: No components found for: \"CBC\"    _____________________________________________________  Review of systems: ROS is negative other than that noted in the HPI.  Constitutional: Negative for fatigue and fever.   HENT: Negative for sore throat.    Respiratory: Negative for shortness of breath.    Cardiovascular: Negative for chest pain.   Gastrointestinal: Negative for abdominal pain.   Endocrine: Negative for cold intolerance and heat intolerance.   Genitourinary: Negative for flank pain.   Musculoskeletal: Negative for back pain.   Skin: Negative for rash.   Allergic/Immunologic: Negative for immunocompromised state.   Neurological: Negative for dizziness.   Psychiatric/Behavioral: Negative for agitation.     Right knee:  No significant skin lesions or deformity   Pain with forced deep knee flexion  Medial joint line tenderness  ROM 0-135      Physical exam:  General/Constitutional: NAD, well developed, well nourished  HENT: Normocephalic, atraumatic  CV: Intact distal pulses, regular rate  Resp: No respiratory distress or labored breathing  Abdomen: " soft, nondistended   Lymphatic: No lymphadenopathy palpated  Neuro: Alert and Oriented x 3, no focal deficits  Psych: Normal mood, normal affect  Skin: Warm, dry, no rashes, no erythema  _____________________________________________________  STUDIES REVIEWED:  MRI of the right knee reviewed and interpreted today. These show prior MCL sprain signal within the medial mensicus without definitive tear.      PROCEDURES PERFORMED:  No Procedures performed today    Scribe Attestation      I,:  Tanisha Johnson MA am acting as a scribe while in the presence of the attending physician.:       I,:  Jones Chin MD personally performed the services described in this documentation    as scribed in my presence.:

## 2025-03-21 ENCOUNTER — APPOINTMENT (EMERGENCY)
Dept: RADIOLOGY | Facility: HOSPITAL | Age: 25
End: 2025-03-21
Payer: COMMERCIAL

## 2025-03-21 ENCOUNTER — HOSPITAL ENCOUNTER (EMERGENCY)
Facility: HOSPITAL | Age: 25
Discharge: HOME | End: 2025-03-21
Attending: EMERGENCY MEDICINE
Payer: COMMERCIAL

## 2025-03-21 VITALS
RESPIRATION RATE: 18 BRPM | DIASTOLIC BLOOD PRESSURE: 74 MMHG | OXYGEN SATURATION: 93 % | SYSTOLIC BLOOD PRESSURE: 114 MMHG | HEART RATE: 90 BPM | TEMPERATURE: 99.2 F

## 2025-03-21 DIAGNOSIS — R42 DIZZINESS: ICD-10-CM
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
## 2025-03-21 DIAGNOSIS — S09.90XA INJURY OF HEAD, INITIAL ENCOUNTER: ICD-10-CM

## 2025-03-21 LAB
ALBUMIN SERPL BCG-MCNC: 4.6 G/DL (ref 3.5–5)
ALP SERPL-CCNC: 64 U/L (ref 34–104)
ALT SERPL W P-5'-P-CCNC: 33 U/L (ref 7–52)
ANION GAP SERPL CALCULATED.3IONS-SCNC: 12 MMOL/L (ref 4–13)
AST SERPL W P-5'-P-CCNC: 34 U/L (ref 13–39)
BACTERIA UR QL AUTO: ABNORMAL /HPF
BASOPHILS # BLD AUTO: 0.01 THOUSANDS/ÂΜL (ref 0–0.1)
BASOPHILS NFR BLD AUTO: 0 % (ref 0–1)
BILIRUB SERPL-MCNC: 0.28 MG/DL (ref 0.2–1)
BILIRUB UR QL STRIP: NEGATIVE
BUN SERPL-MCNC: 19 MG/DL (ref 5–25)
CALCIUM SERPL-MCNC: 9.7 MG/DL (ref 8.4–10.2)
CHLORIDE SERPL-SCNC: 103 MMOL/L (ref 96–108)
CLARITY UR: ABNORMAL
CO2 SERPL-SCNC: 22 MMOL/L (ref 21–32)
COLOR UR: YELLOW
CREAT SERPL-MCNC: 0.92 MG/DL (ref 0.6–1.3)
EOSINOPHIL # BLD AUTO: 0.07 THOUSAND/ÂΜL (ref 0–0.61)
EOSINOPHIL NFR BLD AUTO: 1 % (ref 0–6)
ERYTHROCYTE [DISTWIDTH] IN BLOOD BY AUTOMATED COUNT: 12.9 % (ref 11.6–15.1)
EXT PREGNANCY TEST URINE: NEGATIVE
EXT. CONTROL: NORMAL
GFR SERPL CREATININE-BSD FRML MDRD: 88 ML/MIN/1.73SQ M
GLUCOSE SERPL-MCNC: 93 MG/DL (ref 65–140)
GLUCOSE UR STRIP-MCNC: NEGATIVE MG/DL
HCT VFR BLD AUTO: 37.4 % (ref 34.8–46.1)
HGB BLD-MCNC: 11.9 G/DL (ref 11.5–15.4)
HGB UR QL STRIP.AUTO: ABNORMAL
IMM GRANULOCYTES # BLD AUTO: 0 THOUSAND/UL (ref 0–0.2)
IMM GRANULOCYTES NFR BLD AUTO: 0 % (ref 0–2)
KETONES UR STRIP-MCNC: ABNORMAL MG/DL
LEUKOCYTE ESTERASE UR QL STRIP: ABNORMAL
LYMPHOCYTES # BLD AUTO: 1.82 THOUSANDS/ÂΜL (ref 0.6–4.47)
LYMPHOCYTES NFR BLD AUTO: 37 % (ref 14–44)
MCH RBC QN AUTO: 28.6 PG (ref 26.8–34.3)
MCHC RBC AUTO-ENTMCNC: 31.8 G/DL (ref 31.4–37.4)
MCV RBC AUTO: 90 FL (ref 82–98)
MONOCYTES # BLD AUTO: 0.29 THOUSAND/ÂΜL (ref 0.17–1.22)
MONOCYTES NFR BLD AUTO: 6 % (ref 4–12)
MUCOUS THREADS UR QL AUTO: ABNORMAL
NEUTROPHILS # BLD AUTO: 2.74 THOUSANDS/ÂΜL (ref 1.85–7.62)
NEUTS SEG NFR BLD AUTO: 56 % (ref 43–75)
NITRITE UR QL STRIP: NEGATIVE
NON-SQ EPI CELLS URNS QL MICRO: ABNORMAL /HPF
NRBC BLD AUTO-RTO: 0 /100 WBCS
PH UR STRIP.AUTO: 6 [PH]
PLATELET # BLD AUTO: 339 THOUSANDS/UL (ref 149–390)
PMV BLD AUTO: 9.2 FL (ref 8.9–12.7)
POTASSIUM SERPL-SCNC: 3.9 MMOL/L (ref 3.5–5.3)
PROT SERPL-MCNC: 7.4 G/DL (ref 6.4–8.4)
PROT UR STRIP-MCNC: ABNORMAL MG/DL
RBC # BLD AUTO: 4.16 MILLION/UL (ref 3.81–5.12)
RBC #/AREA URNS AUTO: ABNORMAL /HPF
SODIUM SERPL-SCNC: 137 MMOL/L (ref 135–147)
SP GR UR STRIP.AUTO: >=1.03 (ref 1–1.03)
UROBILINOGEN UR STRIP-ACNC: <2 MG/DL
WBC # BLD AUTO: 4.93 THOUSAND/UL (ref 4.31–10.16)
WBC #/AREA URNS AUTO: ABNORMAL /HPF

## 2025-03-21 PROCEDURE — 96366 THER/PROPH/DIAG IV INF ADDON: CPT

## 2025-03-21 PROCEDURE — 72125 CT NECK SPINE W/O DYE: CPT

## 2025-03-21 PROCEDURE — 80053 COMPREHEN METABOLIC PANEL: CPT | Performed by: EMERGENCY MEDICINE

## 2025-03-21 PROCEDURE — 70450 CT HEAD/BRAIN W/O DYE: CPT

## 2025-03-21 PROCEDURE — 36415 COLL VENOUS BLD VENIPUNCTURE: CPT | Performed by: EMERGENCY MEDICINE

## 2025-03-21 PROCEDURE — 93005 ELECTROCARDIOGRAM TRACING: CPT

## 2025-03-21 PROCEDURE — 99284 EMERGENCY DEPT VISIT MOD MDM: CPT

## 2025-03-21 PROCEDURE — 81001 URINALYSIS AUTO W/SCOPE: CPT | Performed by: EMERGENCY MEDICINE

## 2025-03-21 PROCEDURE — 96365 THER/PROPH/DIAG IV INF INIT: CPT

## 2025-03-21 PROCEDURE — 81025 URINE PREGNANCY TEST: CPT | Performed by: EMERGENCY MEDICINE

## 2025-03-21 PROCEDURE — 99285 EMERGENCY DEPT VISIT HI MDM: CPT | Performed by: EMERGENCY MEDICINE

## 2025-03-21 PROCEDURE — 85025 COMPLETE CBC W/AUTO DIFF WBC: CPT | Performed by: EMERGENCY MEDICINE

## 2025-03-21 RX ORDER — ACETAMINOPHEN 10 MG/ML
1000 INJECTION, SOLUTION INTRAVENOUS ONCE
Status: COMPLETED | OUTPATIENT
Start: 2025-03-21 | End: 2025-03-21

## 2025-03-21 RX ADMIN — ACETAMINOPHEN 1000 MG: 10 INJECTION INTRAVENOUS at 20:37

## 2025-03-21 RX ADMIN — SODIUM CHLORIDE 1000 ML: 0.9 INJECTION, SOLUTION INTRAVENOUS at 20:37

## 2025-03-22 NOTE — ED PROVIDER NOTES
Time reflects when diagnosis was documented in both MDM as applicable and the Disposition within this note       Time User Action Codes Description Comment    3/21/2025 10:42 PM Duane Clarke [W19.XXXA] Fall, initial encounter     3/21/2025 10:42 PM Duane Clarke [S09.90XA] Injury of head, initial encounter     3/21/2025 10:43 PM Duane Clarke [R42] Dizziness           ED Disposition       ED Disposition   Discharge    Condition   Stable    Date/Time   Fri Mar 21, 2025 10:42 PM    Comment   Mira Jameson discharge to home/self care.                   Assessment & Plan       Medical Decision Making  Pulse ox 93% on room air indicating adequate oxygenation.        Differential diagnose include but not limited to intracranial hemorrhage, cervical fracture    Amount and/or Complexity of Data Reviewed  Labs: ordered.  Radiology: ordered.  ECG/medicine tests: ordered and independent interpretation performed.    Risk  Prescription drug management.             Medications   sodium chloride 0.9 % bolus 1,000 mL (0 mL Intravenous Stopped 3/21/25 2242)   acetaminophen (Ofirmev) injection 1,000 mg (0 mg Intravenous Stopped 3/21/25 2242)       ED Risk Strat Scores                            SBIRT 20yo+      Flowsheet Row Most Recent Value   Initial Alcohol Screen: US AUDIT-C     1. How often do you have a drink containing alcohol? 0 Filed at: 03/21/2025 2016   2. How many drinks containing alcohol do you have on a typical day you are drinking?  0 Filed at: 03/21/2025 2016   3a. Male UNDER 65: How often do you have five or more drinks on one occasion? 0 Filed at: 03/21/2025 2016   3b. FEMALE Any Age, or MALE 65+: How often do you have 4 or more drinks on one occassion? 0 Filed at: 03/21/2025 2016   Audit-C Score 0 Filed at: 03/21/2025 2016   CRYSTAL: How many times in the past year have you...    Used an illegal drug or used a prescription medication for non-medical reasons? Never Filed at: 03/21/2025 2016                             History of Present Illness       Chief Complaint   Patient presents with    Fall     Pt present to the ED via EMS with c/o fall s/p feeling dizziness and falling  a flight of stair (8 stairs) . Possible LOC . Pt c/o headache , neck pain and vomiting x 1 on route .        Past Medical History:   Diagnosis Date    Asthma     Bipolar and related disorder (HCC)     Diabetes mellitus (HCC)     Eczema     Schizo affective schizophrenia (HCC)     provided by friends/family      Past Surgical History:   Procedure Laterality Date    APPENDECTOMY      APPENDECTOMY LAPAROSCOPIC N/A 01/07/2025    Procedure: APPENDECTOMY LAPAROSCOPIC;  Surgeon: Deonte Hart MD;  Location: [facility];  Service: General      Family History   Adopted: Yes   Problem Relation Age of Onset    Diabetes Mother     Bipolar disorder Mother     Schizoaffective Disorder  Mother       Social History     Tobacco Use    Smoking status: Former     Current packs/day: 0.25     Types: Cigarettes    Smokeless tobacco: Never   Vaping Use    Vaping status: Former    Substances: Nicotine, Flavoring   Substance Use Topics    Alcohol use: Not Currently    Drug use: Not Currently     Comment: former marijuana use      E-Cigarette/Vaping    E-Cigarette Use Former User       E-Cigarette/Vaping Substances    Nicotine Yes     THC No     CBD No     Flavoring Yes     Other No     Unknown No       I have reviewed and agree with the history as documented.     Patient brought in by EMS for evaluation after she fell down some stairs.  Patient states she got dizzy lost her balance falling down.  Unsure if she lost consciousness.  Plan headache and neck pain.  1 episode of vomiting while in the back of the ambulance.  Denies any back stomach chest or extremity pain.      History provided by:  Patient   used: No    Fall      Review of Systems   All other systems reviewed and are negative.          Objective       ED Triage Vitals [03/21/25  2014]   Temperature Pulse Blood Pressure Respirations SpO2 Patient Position - Orthostatic VS   99.2 °F (37.3 °C) 90 114/74 18 93 % Lying      Temp Source Heart Rate Source BP Location FiO2 (%) Pain Score    Oral Monitor Right arm -- 6      Vitals      Date and Time Temp Pulse SpO2 Resp BP Pain Score FACES Pain Rating User   03/21/25 2014 99.2 °F (37.3 °C) 90 93 % 18 114/74 6 -- LS            Physical Exam  Vitals and nursing note reviewed.   Constitutional:       General: She is not in acute distress.  HENT:      Head:     Eyes:      Extraocular Movements: Extraocular movements intact.      Conjunctiva/sclera: Conjunctivae normal.      Pupils: Pupils are equal, round, and reactive to light.   Cardiovascular:      Rate and Rhythm: Normal rate and regular rhythm.      Pulses: Normal pulses.   Pulmonary:      Effort: Pulmonary effort is normal. No respiratory distress.      Breath sounds: Normal breath sounds.   Abdominal:      Tenderness: There is no abdominal tenderness.   Musculoskeletal:      Cervical back: Normal range of motion. Tenderness present.   Neurological:      General: No focal deficit present.      Mental Status: She is alert and oriented to person, place, and time.         Results Reviewed       Procedure Component Value Units Date/Time    Comprehensive metabolic panel [[id number]] Collected: 03/21/25 2036    Lab Status: Final result Specimen: Blood from Arm, Left Updated: 03/21/25 2058     Sodium 137 mmol/L      Potassium 3.9 mmol/L      Chloride 103 mmol/L      CO2 22 mmol/L      ANION GAP 12 mmol/L      BUN 19 mg/dL      Creatinine 0.92 mg/dL      Glucose 93 mg/dL      Calcium 9.7 mg/dL      AST 34 U/L      ALT 33 U/L      Alkaline Phosphatase 64 U/L      Total Protein 7.4 g/dL      Albumin 4.6 g/dL      Total Bilirubin 0.28 mg/dL      eGFR 88 ml/min/1.73sq m     Narrative:      National Kidney Disease Foundation guidelines for Chronic Kidney Disease (CKD):     Stage 1 with normal or high GFR (GFR >  90 mL/min/1.73 square meters)    Stage 2 Mild CKD (GFR = 60-89 mL/min/1.73 square meters)    Stage 3A Moderate CKD (GFR = 45-59 mL/min/1.73 square meters)    Stage 3B Moderate CKD (GFR = 30-44 mL/min/1.73 square meters)    Stage 4 Severe CKD (GFR = 15-29 mL/min/1.73 square meters)    Stage 5 End Stage CKD (GFR <15 mL/min/1.73 square meters)  Note: GFR calculation is accurate only with a steady state creatinine    Urine Microscopic [[id number]]  (Abnormal) Collected: 03/21/25 2032    Lab Status: Final result Specimen: Urine, Clean Catch Updated: 03/21/25 2053     RBC, UA 2-4 /hpf      WBC, UA 4-10 /hpf      Epithelial Cells Moderate /hpf      Bacteria, UA Moderate /hpf      MUCUS THREADS Innumerable    UA (URINE) with reflex to Scope [[id number]]  (Abnormal) Collected: 03/21/25 2032    Lab Status: Final result Specimen: Urine, Clean Catch Updated: 03/21/25 2041     Color, UA Yellow     Clarity, UA Slightly Cloudy     Specific Gravity, UA >=1.030     pH, UA 6.0     Leukocytes, UA Small     Nitrite, UA Negative     Protein, UA Trace mg/dl      Glucose, UA Negative mg/dl      Ketones, UA Trace mg/dl      Urobilinogen, UA <2.0 mg/dl      Bilirubin, UA Negative     Occult Blood, UA Moderate    CBC and differential [[id number]] Collected: 03/21/25 2036    Lab Status: Final result Specimen: Blood from Arm, Left Updated: 03/21/25 2041     WBC 4.93 Thousand/uL      RBC 4.16 Million/uL      Hemoglobin 11.9 g/dL      Hematocrit 37.4 %      MCV 90 fL      MCH 28.6 pg      MCHC 31.8 g/dL      RDW 12.9 %      MPV 9.2 fL      Platelets 339 Thousands/uL      nRBC 0 /100 WBCs      Segmented % 56 %      Immature Grans % 0 %      Lymphocytes % 37 %      Monocytes % 6 %      Eosinophils Relative 1 %      Basophils Relative 0 %      Absolute Neutrophils 2.74 Thousands/µL      Absolute Immature Grans 0.00 Thousand/uL      Absolute Lymphocytes 1.82 Thousands/µL      Absolute Monocytes 0.29 Thousand/µL      Eosinophils Absolute 0.07  Thousand/µL      Basophils Absolute 0.01 Thousands/µL     POCT pregnancy, urine [[id number]]  (Normal) Collected: 03/21/25 2038    Lab Status: Final result Updated: 03/21/25 2038     EXT Preg Test, Ur Negative     Control Valid            CT cervical spine without contrast   Final Interpretation by Portillo Sanchez MD (03/21 2228)      No cervical spine fracture or traumatic malalignment.                  Workstation performed: [id number]         CT head without contrast   Final Interpretation by Portillo Sanchez MD (03/21 2225)      No acute intracranial abnormality.                  Workstation performed: [id number]             ECG 12 Lead Documentation Only    Date/Time: 3/21/2025 8:34 PM    Performed by: Duane Clarke DO  Authorized by: Duane Clarke DO    ECG reviewed by me, the ED Provider: yes    Patient location:  ED  Interpretation:     Interpretation: normal    Rate:     ECG rate:  88    ECG rate assessment: normal    Rhythm:     Rhythm: sinus rhythm    Ectopy:     Ectopy: none    ST segments:     ST segments:  Normal  T waves:     T waves: normal        ED Medication and Procedure Management   Prior to Admission Medications   Prescriptions Last Dose Informant Patient Reported? Taking?   ALPRAZolam (XANAX) 1 mg tablet   No No   Sig: Take 1 tablet (1 mg total) by mouth once for 1 dose 20-30 minutes prior to scheduled MRI.   albuterol (ACCUNEB) 1.25 MG/3ML nebulizer solution   No No   Sig: Take 3 mL (1.25 mg total) by nebulization every 6 (six) hours as needed for wheezing or shortness of breath   albuterol (PROVENTIL HFA,VENTOLIN HFA) 90 mcg/act inhaler   No No   Sig: Inhale 2 puffs every 6 (six) hours as needed for wheezing   budesonide-formoterol (SYMBICORT) 160-4.5 mcg/act inhaler   No No   Sig: Inhale 2 puffs 2 (two) times a day Rinse mouth after use.   ciprofloxacin-dexamethasone (CIPRODEX) otic suspension   No No   Sig: Administer 4 drops into both ears 2 (two) times a day for 5 days    fluticasone (FLONASE) 50 mcg/act nasal spray   No No   Si spray into each nostril daily   loratadine (CLARITIN) 10 mg tablet   No No   Sig: Take 1 tablet (10 mg total) by mouth daily   metFORMIN (GLUCOPHAGE) 500 mg tablet   No No   Sig: Take 1 tablet (500 mg total) by mouth in the morning   pantoprazole (PROTONIX) 40 mg tablet   No No   Sig: take 1 tablet by mouth once daily   polyethylene glycol (MIRALAX) 17 g packet   No No   Sig: Take 17 g by mouth daily   psyllium (METAMUCIL) 58.6 % packet   Yes No   Sig: Take 1 packet by mouth daily      Facility-Administered Medications: None     Patient's Medications   Discharge Prescriptions    No medications on file     No discharge procedures on file.  ED SEPSIS DOCUMENTATION   Time reflects when diagnosis was documented in both MDM as applicable and the Disposition within this note       Time User Action Codes Description Comment    3/21/2025 10:42 PM Duane Clarke [W19.XXXA] Fall, initial encounter     3/21/2025 10:42 PM Duane Clarke [S09.90XA] Injury of head, initial encounter     3/21/2025 10:43 PM Duane Clarke [R42] Dizziness                  Duane Clarke DO  25 2325

## 2025-03-24 LAB
ATRIAL RATE: 88 BPM
P AXIS: 58 DEGREES
PR INTERVAL: 136 MS
QRS AXIS: 74 DEGREES
QRSD INTERVAL: 80 MS
QT INTERVAL: 352 MS
QTC INTERVAL: 425 MS
T WAVE AXIS: 33 DEGREES
VENTRICULAR RATE: 88 BPM

## 2025-03-24 PROCEDURE — 93010 ELECTROCARDIOGRAM REPORT: CPT | Performed by: INTERNAL MEDICINE

## 2025-03-27 ENCOUNTER — OFFICE VISIT (OUTPATIENT)
Age: 25
End: 2025-03-27
Payer: COMMERCIAL

## 2025-03-27 VITALS — WEIGHT: 201 LBS | BODY MASS INDEX: 35.61 KG/M2 | HEIGHT: 63 IN

## 2025-03-27 DIAGNOSIS — L60.0 INGROWN TOENAIL OF LEFT FOOT: Primary | ICD-10-CM

## 2025-03-27 PROCEDURE — 11730 AVULSION NAIL PLATE SIMPLE 1: CPT | Performed by: STUDENT IN AN ORGANIZED HEALTH CARE EDUCATION/TRAINING PROGRAM

## 2025-03-27 PROCEDURE — 99213 OFFICE O/P EST LOW 20 MIN: CPT | Performed by: STUDENT IN AN ORGANIZED HEALTH CARE EDUCATION/TRAINING PROGRAM

## 2025-03-27 NOTE — PROGRESS NOTES
"[facility] Office Visit    ASSESSMENT     Diagnoses and all orders for this visit:    Ingrown toenail of left foot  -     Nail removal             Problem List Items Addressed This Visit    None  Visit Diagnoses         Ingrown toenail of left foot    -  Primary    Relevant Orders    Nail removal              PLAN  -Nail avulsion performed as below:  -Nail bed was dressed with bacitracin, DSD, 1 inch Coban  -Patient instructed to take bandage off in 24 hours, wash area lightly with warm soap and water, dry area thoroughly, apply bacitracin and Band-Aid daily x10 days.  -Patient instructed to call our office for an earlier appointment should any extreme pain, redness, swelling, purulent drainage present.    Nail removal    Date/Time: 3/27/2025 8:30 AM    Performed by: Travon Silverio DPM  Authorized by: Travon Silverio DPM    Patient location:  Clinic  Indications / Diagnosis:  Ingrown toenail  Universal Protocol:  procedure performed by consultantConsent: Verbal consent obtained.  Risks and benefits: risks, benefits and alternatives were discussed  Consent given by: patient  Time out: Immediately prior to procedure a \"time out\" was called to verify the correct patient, procedure, equipment, support staff and site/side marked as required.  Patient understanding: patient states understanding of the procedure being performed  Site marked: the operative site was marked  Patient identity confirmed: verbally with patient    Location:     Foot:  L big toe  Pre-procedure details:     Skin preparation:  Alcohol and Betadine  Anesthesia (see MAR for exact dosages):     Anesthesia method:  Local infiltration    Local anesthetic:  Lidocaine 1% w/o epi  Nail Removal:     Nail removed:  Partial    Nail side:  Medial    Nail bed sutured: no    Ingrown nail:     Wedge excision of skin: no      Nail matrix removed or ablated:  None  Post-procedure details:     Dressing:  4x4 sterile gauze, antibiotic " "ointment and gauze roll    Patient tolerance of procedure:  Tolerated well, no immediate complications           SUBJECTIVE    Chief Complaint:  Toenail pain     Patient ID: Mira Meyers is a pleasant 24 year old female who presents today with bilateral ingrown toenails. She states that they have been bothering her for the last few months. She states that the right great toe is worse than the left. It have been getting infected and some puss has been coming out of it. She does admit that she has been picking at them which she knows is not helpful.      2/24/2025: Mira presents today for her left ingrown toenail. She states that the day after her last visit her great toe started to become painful, red and swollen. There was also some puss coming out at this time. She states that it only lasted a few days and ever since has not been bothersome. She now has a small dark spot on her nail that she has some concerns about. She denies any pain at this time.     3/27/2025: Mira presents today for reevaluation of her left great toe. She has had an ingrown toenail for about a week now. There has been some bleeding, redness, puss and some soreness.         The following portions of the patient's history were reviewed and updated as appropriate: allergies, current medications, past family history, past medical history, past social history, past surgical history and problem list.    Review of Systems   Constitutional: Negative.    HENT: Negative.     Respiratory: Negative.     Cardiovascular: Negative.    Gastrointestinal: Negative.    Musculoskeletal: Negative.    Skin: Negative.    Neurological: Negative.          OBJECTIVE      Ht 5' 3\" (1.6 m)   Wt 91.2 kg (201 lb)   BMI 35.61 kg/m²        Physical Exam  Constitutional:       Appearance: Normal appearance.   HENT:      Head: Normocephalic and atraumatic.   Eyes:      General:         Right eye: No discharge.         Left eye: No discharge.   Cardiovascular: "      Rate and Rhythm: Normal rate and regular rhythm.      Pulses:           Dorsalis pedis pulses are 2+ on the right side and 2+ on the left side.        Posterior tibial pulses are 2+ on the right side and 2+ on the left side.   Pulmonary:      Effort: Pulmonary effort is normal.      Breath sounds: Normal breath sounds.   Musculoskeletal:      Comments: Pain on palpation to the left hallux medial border   Skin:     General: Skin is warm.      Capillary Refill: Capillary refill takes less than 2 seconds.      Comments: Left medial hallux is ingrown with erythema and edema to the periungual skin   Neurological:      Sensory: Sensation is intact. No sensory deficit.

## 2025-03-31 ENCOUNTER — OFFICE VISIT (OUTPATIENT)
Age: 25
End: 2025-03-31

## 2025-03-31 ENCOUNTER — TELEPHONE (OUTPATIENT)
Age: 25
End: 2025-03-31

## 2025-03-31 VITALS
OXYGEN SATURATION: 98 % | RESPIRATION RATE: 17 BRPM | TEMPERATURE: 98.3 F | HEIGHT: 63 IN | SYSTOLIC BLOOD PRESSURE: 122 MMHG | BODY MASS INDEX: 35.26 KG/M2 | WEIGHT: 199 LBS | DIASTOLIC BLOOD PRESSURE: 84 MMHG

## 2025-03-31 DIAGNOSIS — Z00.00 ANNUAL PHYSICAL EXAM: Primary | ICD-10-CM

## 2025-03-31 DIAGNOSIS — E66.811 CLASS 1 OBESITY WITH BODY MASS INDEX (BMI) OF 34.0 TO 34.9 IN ADULT, UNSPECIFIED OBESITY TYPE, UNSPECIFIED WHETHER SERIOUS COMORBIDITY PRESENT: ICD-10-CM

## 2025-03-31 DIAGNOSIS — J45.40 MODERATE PERSISTENT ASTHMA WITHOUT COMPLICATION: ICD-10-CM

## 2025-03-31 DIAGNOSIS — R73.03 PREDIABETES: ICD-10-CM

## 2025-03-31 DIAGNOSIS — Z01.818 PREOPERATIVE CLEARANCE: ICD-10-CM

## 2025-03-31 DIAGNOSIS — J30.81 ALLERGIC RHINITIS DUE TO ANIMAL HAIR AND DANDER: ICD-10-CM

## 2025-03-31 DIAGNOSIS — E28.2 PCOS (POLYCYSTIC OVARIAN SYNDROME): ICD-10-CM

## 2025-03-31 DIAGNOSIS — K59.00 CONSTIPATION, UNSPECIFIED CONSTIPATION TYPE: ICD-10-CM

## 2025-03-31 DIAGNOSIS — F32.A DEPRESSION, UNSPECIFIED DEPRESSION TYPE: ICD-10-CM

## 2025-03-31 DIAGNOSIS — H65.93 BILATERAL SEROUS OTITIS MEDIA, UNSPECIFIED CHRONICITY: ICD-10-CM

## 2025-03-31 PROCEDURE — 99395 PREV VISIT EST AGE 18-39: CPT | Performed by: FAMILY MEDICINE

## 2025-03-31 PROCEDURE — 99214 OFFICE O/P EST MOD 30 MIN: CPT | Performed by: FAMILY MEDICINE

## 2025-03-31 RX ORDER — SENNA AND DOCUSATE SODIUM 50; 8.6 MG/1; MG/1
1 TABLET, FILM COATED ORAL DAILY
Qty: 7 TABLET | Refills: 0 | Status: SHIPPED | OUTPATIENT
Start: 2025-03-31

## 2025-03-31 NOTE — TELEPHONE ENCOUNTER
Patient brought [employer] [facility] [employer] form into office.    Original was not scanned into encounter.    Received completed form, and will fax to: [phone]    Will also call patient to provide her with copies to take with on day of surgery.

## 2025-03-31 NOTE — PATIENT INSTRUCTIONS
"Patient Education     Routine physical for adults   The Basics   Written by the doctors and editors at [facility]   What is a physical? -- A physical is a routine visit, or \"check-up,\" with your doctor. You might also hear it called a \"wellness visit\" or \"preventive visit.\"  During each visit, the doctor will:   Ask about your physical and mental health   Ask about your habits, behaviors, and lifestyle   Do an exam   Give you vaccines if needed   Talk to you about any medicines you take   Give advice about your health   Answer your questions  Getting regular check-ups is an important part of taking care of your health. It can help your doctor find and treat any problems you have. But it's also important for preventing health problems.  A routine physical is different from a \"sick visit.\" A sick visit is when you see a doctor because of a health concern or problem. Since physicals are scheduled ahead of time, you can think about what you want to ask the doctor.  How often should I get a physical? -- It depends on your age and health. In general, for people age 21 years and older:   If you are younger than 50 years, you might be able to get a physical every 3 years.   If you are 50 years or older, your doctor might recommend a physical every year.  If you have an ongoing health condition, like diabetes or high blood pressure, your doctor will probably want to see you more often.  What happens during a physical? -- In general, each visit will include:   Physical exam - The doctor or nurse will check your height, weight, heart rate, and blood pressure. They will also look at your eyes and ears. They will ask about how you are feeling and whether you have any symptoms that bother you.   Medicines - It's a good idea to bring a list of all the medicines you take to each doctor visit. Your doctor will talk to you about your medicines and answer any questions. Tell them if you are having any side effects that bother you. You " "should also tell them if you are having trouble paying for any of your medicines.   Habits and behaviors - This includes:   Your diet   Your exercise habits   Whether you smoke, drink alcohol, or use drugs   Whether you are sexually active   Whether you feel safe at home  Your doctor will talk to you about things you can do to improve your health and lower your risk of health problems. They will also offer help and support. For example, if you want to quit smoking, they can give you advice and might prescribe medicines. If you want to improve your diet or get more physical activity, they can help you with this, too.   Lab tests, if needed - The tests you get will depend on your age and situation. For example, your doctor might want to check your:   Cholesterol   Blood sugar   Iron level   Vaccines - The recommended vaccines will depend on your age, health, and what vaccines you already had. Vaccines are very important because they can prevent certain serious or deadly infections.   Discussion of screening - \"Screening\" means checking for diseases or other health problems before they cause symptoms. Your doctor can recommend screening based on your age, risk, and preferences. This might include tests to check for:   Cancer, such as breast, prostate, cervical, ovarian, colorectal, prostate, lung, or skin cancer   Sexually transmitted infections, such as chlamydia and gonorrhea   Mental health conditions like depression and anxiety  Your doctor will talk to you about the different types of screening tests. They can help you decide which screenings to have. They can also explain what the results might mean.   Answering questions - The physical is a good time to ask the doctor or nurse questions about your health. If needed, they can refer you to other doctors or specialists, too.  Adults older than 65 years often need other care, too. As you get older, your doctor will talk to you about:   How to prevent falling at " home   Hearing or vision tests   Memory testing   How to take your medicines safely   Making sure that you have the help and support you need at home  All topics are updated as new evidence becomes available and our peer review process is complete.  This topic retrieved from [employer] on: May 02, 2024.  Topic 984443 Version 1.0  Release: 32.4.3 - C32.122  © 2024 UpToDate, Inc. and/or its affiliates. All rights reserved.  Consumer Information Use and Disclaimer   Disclaimer: This generalized information is a limited summary of diagnosis, treatment, and/or medication information. It is not meant to be comprehensive and should be used as a tool to help the user understand and/or assess potential diagnostic and treatment options. It does NOT include all information about conditions, treatments, medications, side effects, or risks that may apply to a specific patient. It is not intended to be medical advice or a substitute for the medical advice, diagnosis, or treatment of a health care provider based on the health care provider's examination and assessment of a patient's specific and unique circumstances. Patients must speak with a health care provider for complete information about their health, medical questions, and treatment options, including any risks or benefits regarding use of medications. This information does not endorse any treatments or medications as safe, effective, or approved for treating a specific patient. UpToDate, Inc. and its affiliates disclaim any warranty or liability relating to this information or the use thereof.The use of this information is governed by the Terms of Use, available at https://www.[employer]one[employer] Solutionsuwer.com/en/know/clinical-effectiveness-terms. 2024© UpToDate, Inc. and its affiliates and/or licensors. All rights reserved.  Copyright   © 2024 UpToDate, Inc. and/or its affiliates. All rights reserved.

## 2025-03-31 NOTE — PROGRESS NOTES
Adult Annual Physical  Name: Mira Jameson      : 2000      MRN: [id number]  Encounter Provider: Celena Mcgill MD  Encounter Date: 3/31/2025   Encounter department: [facility]    Assessment & Plan  Annual physical exam  Annual physical, pre-op clearance, and problem visit. See details below        Preoperative clearance  Surgery: surgical extraction of wisdom teeth   Anticipated Date of Surgery: 25   Referring Provider: [facility], Dr. Ranjit Brower     Leno is a 24 year old female who presents to the office today for a preoperative consultation at the request of surgeon, Dr. Brower, who plans on performing surgical extraction of wisdom teeth on 25. Planned anesthesia is local. Patient has a bleeding risk of: no recent abnormal bleeding. Patient does not have objections to receiving blood products if needed. Current anti-platelet/anti-coagulation medications that the patient is prescribed includes: none.     Pre-Op Evaluation Assessment  Known risk factors for perioperative complications: None.        Current medications which may produce withdrawal symptoms if withheld perioperatively: none.    Pre-Op Evaluation Plan  1. Further preoperative workup as follows:   - None; no further preoperative work-up is required    2. Medication Management/Recommendations:   - None, continue medication regimen including morning of surgery, with sip of water    3. Prophylaxis for cardiac events with perioperative beta-blockers: not indicated.    4. Patient requires further consultation with: None    Clearance  Patient is MEDICALLY OPTIMIZED for surgery without any additional cardiac testing. See below for additional details re: preop clearance    Form requested from [facility] filled, to be faxed and instructed for pt to  copy and bring entire packet on morning of surgery. H&P and preop medical consultation filled. Consent forms should be reviewed with  surgeon and patient.     Addendum: patient message that dental office called and requested sign consent forms. See telephone and patient message encounter from today. Will review with patient tomorrow morning. Happy to sign consent with patient however this should be reviewed with surgeon..        Constipation, unspecified constipation type  Reports chronic constipation requiring stimulant laxatives. Reports miralax does not help. May be related to PCOS.  reports significant concern about this as it led to complications and her eventual appendectomy.   Pt's last bowel movement was Saturday. She reports overall improved from previous but sometimes goes 4 days without BM  - start senokot if she does not have a BM within the next 1-2 days  - continue miralax prn   - Advise increased hydration with water   - Advise increased fiber intake. Pt does take metamucil   - Can trial natural remedies/fruit juices when needed to avoid overtaking stimulant laxatives   - Work on PCOS treatment and weight management    Orders:    senna-docusate sodium (SENOKOT-S) 8.6-50 mg per tablet; Take 1 tablet by mouth daily    Moderate persistent asthma without complication  Inhale sharp pain in left lower rib area. Went away within 3-4 minutes. Uses inhaler and waits for pain to subside. Using symbicort daily. Rescue inhaler last used Saturday. A lot less than before. Still using with exercise.   Continue current inhaler regimen  Continue to monitor symptoms and signs  Currently better controlled than previous   Has not yet completed PFT previously ordered        Depression, unspecified depression type  Pt reported diagnosis of bipolar, schizoaffective d/o and ADHD. Pt stopped taking medications including abilify and trazodone as she lacked insurance. Pt is working with psychiatry for diagnoses and medication management and is thinking about restarting trazodone to help with sleep          Allergic rhinitis due to animal hair and  dander  Continue claritin and flonase.        Bilateral serous otitis media, unspecified chronicity  Left ear feels muffled and cannot hear well. Ear exam consistent with bilateral serous otitis media without acute infection  Discussed causes of this and likely related to allergies  Continue claritin and flonase  Pt admits to not like using flonase  Can increase 1 spray to 2 spray daily but recommend at least maintenance daily and avoid allergic triggers        Class 1 obesity with body mass index (BMI) of 34.0 to 34.9 in adult, unspecified obesity type, unspecified whether serious comorbidity present           Prediabetes         PCOS (polycystic ovarian syndrome)         Preventive Screenings:  - Diabetes Screening: screening up-to-date  - Cholesterol Screening: screening up-to-date   - Chlamydia Screening: patient declines   - Hepatitis C screening: screening up-to-date   - HIV screening: screening up-to-date   - Cervical cancer screening: screening up-to-date   - Colon cancer screening: screening not indicated   - Lung cancer screening: screening not indicated     Immunizations:  - Immunizations due: HPV (Gardasil 9)  - The patient declines recommended vaccines currently despite my recommendations      Counseling/Anticipatory Guidance:    - Dental health: discussed importance of regular tooth brushing, flossing, and dental visits.   - Diet: discussed recommendations for a healthy/well-balanced diet.   - Exercise: the importance of regular exercise/physical activity was discussed. Recommend exercise 3-5 times per week for at least 30 minutes.     BMI Counseling: Body mass index is 35.25 kg/m². The BMI is above normal. Nutrition recommendations include decreasing portion sizes, encouraging healthy choices of fruits and vegetables, decreasing fast food intake and limiting drinks that contain sugar. Exercise recommendations include moderate physical activity 150 minutes/week, exercising 3-5 times per week and  strength training exercises. Rationale for BMI follow-up plan is due to patient being overweight or obese.       Additional details for Preop clearance:   Assessment of Chronic Conditions:   - Prediabetes: metformin 500 mg daily   - Asthma: controlled on maintenance inhaler daily and rescue inhaler as needed      Assessment of Cardiac Risk:  Denies unstable or severe angina or MI in the last 6 weeks or history of stent placement in the last year   Denies decompensated heart failure (e.g. New onset heart failure, NYHA functional class IV heart failure, or worsening existing heart failure)  Denies significant arrhythmias such as high grade AV block, symptomatic ventricular arrhythmia, newly recognized ventricular tachycardia, supraventricular tachycardia with resting heart rate >100, or symptomatic bradycardia  Denies severe heart valve disease including aortic stenosis or symptomatic mitral stenosis    Revised Cardiac Risk Index (RCRI) for Pre-Operative Risk   (estimates risk of cardiac complications after noncardiac surgery)    High-risk surgery: No 0 / Yes +1  Intraperitoneal, intrathoracic, suprainguinal vascular  History of ischemic heart disease: No 0 / Yes +1  Hx of MI, (+) exercise test, current chest pain considered due to myocardial ischemia, use of nitrate therapy or ECG with pathological Q waves)  History of CHF: No 0 / Yes +1  Pulmonary edema, B/L rales or S3 gallop; HIRSCH, orthopnea, PND, CXR showing pulmonary vascular redistribution)  History of cerebrovascular disease: No 0 / Yes +1  Prior TIA or stroke  Pre-operative treatment with insulin: No 0 / Yes +1  Pre-operative creatinine >2 mg/dL: No 0 / Yes +1    RCRI Scorin points: Class I Risk, 3.9% Risk of Major Cardiac Event  1 point: Class II Risk, 6.0% Risk of Major Cardiac Event  2 points: Class III Risk, 10.1% Risk of Major Cardiac Event  3 points: Class IV Risk, 15% Risk of Major Cardiac Event  4 points: Class IV Risk, 15% Risk of Major Cardiac  Event  5 points: Class IV Risk, 15% Risk of Major Cardiac Event  6 points: Class IV Risk, 15% Risk of Major Cardiac Event    Pre-operative work-up    Laboratory Results: I have personally reviewed the pertinent laboratory results/reports      EKG: Results Review Statement: No pertinent imaging studies reviewed.    Chest x-ray: Results Review Statement: No pertinent imaging studies reviewed.    Previous cardiopulmonary studies within the past year:  Echocardiogram: no  Cardiac Catheterization: no  Stress Test: no  Pulmonary Function Testing: ordered, not completed     History of Present Illness     Adult Annual Physical:  Patient presents for annual physical. 25 yo female presenting for annual physical     [place] tooth surgery clearance on 4/9 and form.      is concerned about constipation. Still having BM's. Sometimes has every day, sometimes goes 4 days without. Never weeks on weeks. Pt does not want to depend on stimulant laxative. Pt reports miralax does not work. Taking fiber supplement .     Diet and Physical Activity:  - Diet/Nutrition: consuming 3-5 servings of fruits/vegetables daily and well balanced diet. has not started dieting yet because of family over. not trying to eat unhealthy but could do better.  - Exercise: 3-4 times a week on average and strength training exercises. physical therapy knee, not to start cardio yet, 5th-6th week. starting physical therapy tomorrow    General Health:  - Sleep: sleeps well. might go back on trazodone with psychiatry  - Hearing: decreased hearing bilateral ears. muffled L>R  - Vision: no vision problems and wears glasses.  - Dental: regular dental visits, brushes teeth twice daily and floss regularly.    /GYN Health:  - Follows with GYN: no.   - Menopause: premenopausal.   - History of STDs: no    Review of Systems   Constitutional:  Negative for chills and fever.   HENT:  Positive for congestion and hearing loss. Negative for ear pain and sore throat.   "  Eyes:  Negative for pain and visual disturbance.   Respiratory:  Positive for shortness of breath. Negative for cough.    Cardiovascular:  Negative for chest pain and palpitations.   Gastrointestinal:  Positive for constipation. Negative for abdominal pain and vomiting.   Genitourinary:  Positive for menstrual problem. Negative for dysuria and hematuria.   Musculoskeletal:  Negative for arthralgias and back pain.   Skin:  Negative for color change and rash.   Neurological:  Negative for seizures and syncope.   Psychiatric/Behavioral:  Positive for dysphoric mood.    All other systems reviewed and are negative.        Objective   /84 (BP Location: Right arm, Patient Position: Sitting, Cuff Size: Large)   Temp 98.3 °F (36.8 °C)   Resp 17   Ht 5' 3\" (1.6 m)   Wt 90.3 kg (199 lb)   SpO2 98%   BMI 35.25 kg/m²     Physical Exam  Vitals reviewed.   Constitutional:       General: She is not in acute distress.     Appearance: Normal appearance. She is well-developed. She is obese.   HENT:      Head: Normocephalic and atraumatic.      Right Ear: External ear normal.      Left Ear: External ear normal.      Ears:      Comments: Bilateral dull tympanic membrane, fluid, no erythema or bulging     Nose: Nose normal.      Mouth/Throat:      Mouth: Mucous membranes are moist.      Pharynx: Oropharynx is clear.   Eyes:      Extraocular Movements: Extraocular movements intact.      Conjunctiva/sclera: Conjunctivae normal.   Cardiovascular:      Rate and Rhythm: Normal rate and regular rhythm.      Pulses: Normal pulses.      Heart sounds: Normal heart sounds. No murmur heard.  Pulmonary:      Effort: Pulmonary effort is normal. No respiratory distress.      Breath sounds: Normal breath sounds. No wheezing or rales.   Abdominal:      Palpations: Abdomen is soft.   Musculoskeletal:         General: No swelling. Normal range of motion.      Cervical back: Neck supple.   Skin:     General: Skin is warm and dry.      " Capillary Refill: Capillary refill takes less than 2 seconds.      Findings: No rash.   Neurological:      General: No focal deficit present.      Mental Status: She is alert. Mental status is at baseline.   Psychiatric:         Mood and Affect: Mood normal.         Behavior: Behavior normal.             Celena Mcgill MD  [facility]  Date: 3/31/2025 Time: 9:28 PM

## 2025-03-31 NOTE — ASSESSMENT & PLAN NOTE
Inhale sharp pain in left lower rib area. Went away within 3-4 minutes. Uses inhaler and waits for pain to subside. Using symbicort daily. Rescue inhaler last used Saturday. A lot less than before. Still using with exercise.   Continue current inhaler regimen  Continue to monitor symptoms and signs  Currently better controlled than previous   Has not yet completed PFT previously ordered

## 2025-03-31 NOTE — ASSESSMENT & PLAN NOTE
Pt reported diagnosis of bipolar, schizoaffective d/o and ADHD. Pt stopped taking medications including abilify and trazodone as she lacked insurance. Pt is working with psychiatry for diagnoses and medication management and is thinking about restarting trazodone to help with sleep

## 2025-04-01 ENCOUNTER — EVALUATION (OUTPATIENT)
Dept: PHYSICAL THERAPY | Facility: CLINIC | Age: 25
End: 2025-04-01
Payer: COMMERCIAL

## 2025-04-01 DIAGNOSIS — M22.2X1 PATELLOFEMORAL PAIN SYNDROME OF RIGHT KNEE: Primary | ICD-10-CM

## 2025-04-01 PROCEDURE — 97161 PT EVAL LOW COMPLEX 20 MIN: CPT | Performed by: PHYSICAL THERAPIST
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
## 2025-04-14 ENCOUNTER — OFFICE VISIT (OUTPATIENT)
Dept: PHYSICAL THERAPY | Facility: CLINIC | Age: 25
End: 2025-04-14
Attending: ORTHOPAEDIC SURGERY
Payer: COMMERCIAL

## 2025-04-14 DIAGNOSIS — M22.2X1 PATELLOFEMORAL PAIN SYNDROME OF RIGHT KNEE: Primary | ICD-10-CM

## 2025-04-14 PROCEDURE — 97112 NEUROMUSCULAR REEDUCATION: CPT | Performed by: PHYSICAL THERAPIST

## 2025-04-14 PROCEDURE — 97110 THERAPEUTIC EXERCISES: CPT | Performed by: PHYSICAL THERAPIST

## 2025-04-14 NOTE — PROGRESS NOTES
"Daily Note     Today's date: 2025  Patient name: Mira Jameson  : 2000  MRN: [id number]  Referring provider: Jones Chin*  Dx:   Encounter Diagnosis     ICD-10-CM    1. Patellofemoral pain syndrome of right knee  M22.2X1           Start Time: 0950  Stop Time: 1030  Total time in clinic (min): 40 minutes  Subjective: Patient reports good HEP compliance. Reports slightly less knee discomfort. Afraid to perform certain exercises.     Objective: See treatment diary below    Assessment: Tolerated treatment well. Patient  required occasional cueing for proper exercise performance for gluteal activation.  Cueing to have feet shoulder width apart during squatting exercise. Reported feeling okay post treatment.     Plan: Continue per plan of care.        Insurance:  A/CMS Eval/ Re-eval POC expires Auth #/ Referral # Total    Start date  Expiration date Extension  Visit limitation?  PT only or  PT+OT? Co-Insurance   CMS 25 needed                         AUTH #:  Date              Authed: Used 1 2              Remaining  11 10               Precautions: prediabetic  Patient provided verbal consent to treatment plan and recommended interventions.    Manuals 4.1        visit 1 2                                  Neuro Re-Ed         Quad set 1x10 3s HEP        SLR 1x10 HEP 2x5, 3\" hold- long sit        Clam shell  3x10         Stand hip abd  3x10 ea. GTB                                  Ther Ex         Mini-squat 1x10 HEP 3x12, 10# med ball       Bridge  2x10, 3\"       Lateral step down  2x10, 6\" step       Leg press  3x10, 155-165#                                           Ther Activity                                "

## 2025-04-18 ENCOUNTER — OFFICE VISIT (OUTPATIENT)
Age: 25
End: 2025-04-18

## 2025-04-18 VITALS
RESPIRATION RATE: 18 BRPM | TEMPERATURE: 98.9 F | HEART RATE: 88 BPM | DIASTOLIC BLOOD PRESSURE: 84 MMHG | OXYGEN SATURATION: 99 % | WEIGHT: 198 LBS | BODY MASS INDEX: 33.8 KG/M2 | SYSTOLIC BLOOD PRESSURE: 115 MMHG | HEIGHT: 64 IN

## 2025-04-18 DIAGNOSIS — S01.531A: Primary | ICD-10-CM

## 2025-04-18 PROCEDURE — 99213 OFFICE O/P EST LOW 20 MIN: CPT | Performed by: FAMILY MEDICINE

## 2025-04-18 RX ORDER — MUPIROCIN 20 MG/G
OINTMENT TOPICAL 3 TIMES DAILY
Qty: 15 G | Refills: 0 | Status: SHIPPED | OUTPATIENT
Start: 2025-04-18

## 2025-04-18 RX ORDER — ARIPIPRAZOLE 5 MG/1
5 TABLET ORAL DAILY
COMMUNITY

## 2025-04-18 NOTE — PROGRESS NOTES
"Name: Mira Jameson      : 2000      MRN: [id number]  Encounter Provider: Christina Mazariegos DO  Encounter Date: 2025   Encounter department: [facility]    :  Assessment & Plan  Piercing in middle third of upper lip  No concern for acute infection - no pain or erythema  Given topical abx for if symptoms worsen   Advised to f/u if develop systemic symptoms including fever   Orders:  •  mupirocin (BACTROBAN) 2 % ointment; Apply topically 3 (three) times a day               History of Present Illness   {?Quick Links Encounters * My Last Note * Last Note in Specialty * Snapshot * Since Last Visit * History :33473}    Presents for following concerns:  -noting torrez upper lip piercing Saturday   -had increased serous discharge with ? Purulent discharge   -noting normal swelling   -no yellow/green discharge or fevers   -no pain      Review of Systems   Constitutional:  Negative for fatigue and fever.   HENT:  Negative for congestion, postnasal drip, rhinorrhea, sneezing, sore throat and tinnitus.    Respiratory:  Negative for cough, shortness of breath and wheezing.    Cardiovascular:  Negative for chest pain and palpitations.   Gastrointestinal:  Negative for abdominal pain, constipation, diarrhea, nausea and vomiting.   Musculoskeletal:  Negative for arthralgias, back pain, joint swelling, myalgias and neck stiffness.   Skin:  Negative for rash and wound.   Neurological:  Negative for weakness and numbness.     Objective {?Quick Links Trend Vitals * Enter New Vitals * Results Review * Timeline (Adult) * Labs * Imaging * Cardiology * Procedures * Lung Cancer Screening * Surgical eConsent :[id number]}  /84 (BP Location: Left arm, Patient Position: Sitting, Cuff Size: Large)   Pulse 88   Temp 98.9 °F (37.2 °C) (Tympanic)   Resp 18   Ht 5' 4\" (1.626 m)   Wt 89.8 kg (198 lb)   SpO2 99%   BMI 33.99 kg/m²       Physical Exam  Vitals and nursing note reviewed. "   Constitutional:       General: She is not in acute distress.     Appearance: She is well-developed.   HENT:      Head: Normocephalic and atraumatic.   Eyes:      Conjunctiva/sclera: Conjunctivae normal.   Cardiovascular:      Rate and Rhythm: Normal rate and regular rhythm.      Heart sounds: No murmur heard.  Pulmonary:      Effort: Pulmonary effort is normal. No respiratory distress.      Breath sounds: Normal breath sounds.   Abdominal:      Palpations: Abdomen is soft.      Tenderness: There is no abdominal tenderness.   Musculoskeletal:         General: No swelling.      Cervical back: Neck supple.   Skin:     General: Skin is warm and dry.      Capillary Refill: Capillary refill takes less than 2 seconds.   Neurological:      Mental Status: She is alert.   Psychiatric:         Mood and Affect: Mood normal.

## 2025-04-21 ENCOUNTER — OFFICE VISIT (OUTPATIENT)
Dept: PHYSICAL THERAPY | Facility: CLINIC | Age: 25
End: 2025-04-21
Payer: COMMERCIAL

## 2025-04-21 DIAGNOSIS — M22.2X1 PATELLOFEMORAL PAIN SYNDROME OF RIGHT KNEE: Primary | ICD-10-CM

## 2025-04-21 PROCEDURE — 97112 NEUROMUSCULAR REEDUCATION: CPT | Performed by: PHYSICAL THERAPIST

## 2025-04-21 PROCEDURE — 97110 THERAPEUTIC EXERCISES: CPT | Performed by: PHYSICAL THERAPIST

## 2025-04-21 NOTE — PROGRESS NOTES
"Daily Note     Today's date: 2025  Patient name: Mira Jameson  : 2000  MRN: [id number]  Referring provider: Jones Chin*  Dx:   Encounter Diagnosis     ICD-10-CM    1. Patellofemoral pain syndrome of right knee  M22.2X1       Start Time: 0930  Stop Time: 1015  Total time in clinic (min): 45 minutes  Subjective: Patient reports improvement in knee symptoms. Reports less irritation overall.     Objective: See treatment diary below    Assessment: Tolerated treatment well. Patient reported feeling good with treatment progression. Cueing needed to avoid valgum with single leg sit to stand focusing on mechanics. Fatigue post session.     Plan: Continue per plan of care.        Insurance:  AMA/CMS Eval/ Re-eval POC expires Auth #/ Referral # Total    Start date  Expiration date Extension  Visit limitation?  PT only or  PT+OT? Co-Insurance   CMS 25 needed                         AUTH #:  Date 4.            Authed: Used 1 2 3             Remaining  11 10 9              Precautions: prediabetic  Patient provided verbal consent to treatment plan and recommended interventions.    Manuals 4.1 ..      visit 1 2 3                                 Neuro Re-Ed         SLR 1x10 HEP 2x5, 3\" hold- long sit        Clam shell  3x10 S/L hip abd. 3x10        Stand hip abd  3x10 ea. GTB       sidestepping   3x2 laps, 12' GTB      Slider to side   2x10 ea.               Ther Ex         Mini-squat 1x10 HEP 3x12, 10# med ball       Bridge  2x10, 3\" 2x10, 3\"      Lateral step down  2x10, 6\" step 3x10, 6\" step      Leg press  3x10, 155-165# resume      Uni STS   2x10                                 Ther Activity                                "

## 2025-04-22 ENCOUNTER — HOSPITAL ENCOUNTER (OUTPATIENT)
Dept: PULMONOLOGY | Facility: HOSPITAL | Age: 25
Discharge: HOME | End: 2025-04-22
Attending: FAMILY MEDICINE
Payer: COMMERCIAL

## 2025-04-22 DIAGNOSIS — J45.40 MODERATE PERSISTENT ASTHMA WITHOUT COMPLICATION: ICD-10-CM

## 2025-04-22 PROCEDURE — 94726 PLETHYSMOGRAPHY LUNG VOLUMES: CPT | Performed by: INTERNAL MEDICINE

## 2025-04-22 PROCEDURE — 94760 N-INVAS EAR/PLS OXIMETRY 1: CPT

## 2025-04-22 PROCEDURE — 94729 DIFFUSING CAPACITY: CPT | Performed by: INTERNAL MEDICINE

## 2025-04-22 PROCEDURE — 94060 EVALUATION OF WHEEZING: CPT

## 2025-04-22 PROCEDURE — 94729 DIFFUSING CAPACITY: CPT

## 2025-04-22 PROCEDURE — 94060 EVALUATION OF WHEEZING: CPT | Performed by: INTERNAL MEDICINE

## 2025-04-22 PROCEDURE — 94726 PLETHYSMOGRAPHY LUNG VOLUMES: CPT

## 2025-04-22 RX ORDER — ALBUTEROL SULFATE 0.83 MG/ML
2.5 SOLUTION RESPIRATORY (INHALATION) ONCE
Status: COMPLETED | OUTPATIENT
Start: 2025-04-22 | End: 2025-04-22

## 2025-04-22 RX ADMIN — ALBUTEROL SULFATE 2.5 MG: 2.5 SOLUTION RESPIRATORY (INHALATION) at 08:50

## 2025-04-23 ENCOUNTER — OFFICE VISIT (OUTPATIENT)
Dept: PHYSICAL THERAPY | Facility: CLINIC | Age: 25
End: 2025-04-23
Payer: COMMERCIAL

## 2025-04-23 DIAGNOSIS — M22.2X1 PATELLOFEMORAL PAIN SYNDROME OF RIGHT KNEE: Primary | ICD-10-CM

## 2025-04-23 PROCEDURE — 97110 THERAPEUTIC EXERCISES: CPT | Performed by: PHYSICAL THERAPIST

## 2025-04-23 NOTE — PROGRESS NOTES
"Daily Note     Today's date: 2025  Patient name: Mira Jameson  : 2000  MRN: [id number]  Referring provider: Jones Chin*  Dx:   Encounter Diagnosis     ICD-10-CM    1. Patellofemoral pain syndrome of right knee  M22.2X1                Subjective: Patient reports improvement in knee symptoms. Reports less irritation overall.     Objective: See treatment diary below    Assessment: Tolerated treatment well. Patient reported feeling good with treatment progression. Cueing needed to avoid valgum with single leg sit to stand focusing on mechanics. Fatigue post session.     Plan: Continue per plan of care.        Insurance:  [employer]/CMS Eval/ Re-eval POC expires Auth #/ Referral # Total    Start date  Expiration date Extension  Visit limitation?  PT only or  PT+OT? Co-Insurance   CMS 25 needed                         AUTH #:  Date 4.1 4.14 4.21            Authed: Used 1 2 3             Remaining  11 10 9              Precautions: prediabetic  Patient provided verbal consent to treatment plan and recommended interventions.    Manuals 4.1 4.14 4.21 4.23     visit 1 2 3 4                                Neuro Re-Ed         SLR 1x10 HEP 2x5, 3\" hold- long sit        Clam shell  3x10 S/L hip abd. 3x10        Stand hip abd  3x10 ea. GTB       sidestepping   3x2 laps, 12' GTB      Slider to side   2x10 ea.               Ther Ex         Mini-squat 1x10 HEP 3x12, 10# med ball       Bridge  2x10, 3\" 2x10, 3\"      Lateral step down  2x10, 6\" step 3x10, 6\" step      Leg press  3x10, 155-165# resume      Uni STS   2x10                                 Ther Activity                                "

## 2025-04-23 NOTE — PROGRESS NOTES
"Daily Note     Today's date: 2025  Patient name: Mira Jameson  : 2000  MRN: [id number]  Referring provider: Jones Chin*  Dx:   Encounter Diagnosis     ICD-10-CM    1. Patellofemoral pain syndrome of right knee  M22.2X1       Start Time: 1010  Stop Time: 1100  Total time in clinic (min): 50 minutes    Subjective: Patient reports she's able to walk between 30-60 minutes on treadmill without an increase in symptoms. Has been performing normal stair navigation at home.     Objective: See treatment diary below    Assessment: Tolerated treatment well. Added anterior step downs/forward step ups this session to increase strength and control with initial vc/tc for knee positioning secondary to valgus pattern with good carryover. Added increased reps and load to improve function this session with no issues but reports fatigue. Patient will continue to benefit from skilled PT interventions to increase hip/knee strength and mechanics.     Plan: Continue per plan of care.        Insurance:  AMA/CMS Eval/ Re-eval POC expires Auth #/ Referral # Total    Start date  Expiration date Extension  Visit limitation?  PT only or  PT+OT? Co-Insurance   CMS 25 needed                         AUTH #:  Date 4.1 4.14 4. 4.23           Authed: Used 1 2 3 4            Remaining  11 10 9 8             Precautions: prediabetic  Patient provided verbal consent to treatment plan and recommended interventions.    Manuals 4.1 4.14 4.21 4.23     visit 1 2 3 4                                Neuro Re-Ed         SLR 1x10 HEP 2x5, 3\" hold- long sit        Clam shell  3x10 S/L hip abd. 3x10        Stand hip abd  3x10 ea. GTB       sidestepping   3x2 laps, 12' GTB      Slider to side   2x10 ea.               Ther Ex         Mini-squat 1x10 HEP 3x12, 10# med ball  3x10 w/ 20# db    SLS R LE only 2x10     Bridge  2x10, 3\" 2x10, 3\" 1x10, 5s holds     Lateral step down  2x10, 6\" step 3x10, 6\" step      Anterior step down    " "2x10 6\" block step     Forward step up    2x10 on 6\" block step holding 10# contralateral R LE only     Leg press  3x10, 155-165# resume 2x10 145#     Uni STS   2x10      Lunges    2x10 lateral/bwd w/ TRX                       Ther Activity                                "

## 2025-04-25 ENCOUNTER — APPOINTMENT (OUTPATIENT)
Dept: LAB | Facility: CLINIC | Age: 25
End: 2025-04-25
Attending: PATHOLOGY
Payer: COMMERCIAL

## 2025-04-25 ENCOUNTER — OFFICE VISIT (OUTPATIENT)
Dept: PHYSICAL THERAPY | Facility: CLINIC | Age: 25
End: 2025-04-25
Attending: ORTHOPAEDIC SURGERY
Payer: COMMERCIAL

## 2025-04-25 ENCOUNTER — TRANSCRIBE ORDERS (OUTPATIENT)
Dept: LAB | Facility: CLINIC | Age: 25
End: 2025-04-25

## 2025-04-25 DIAGNOSIS — D64.9 LOW HEMOGLOBIN: ICD-10-CM

## 2025-04-25 DIAGNOSIS — F25.0 SCHIZOAFFECTIVE DISORDER, BIPOLAR TYPE (HCC): Primary | ICD-10-CM

## 2025-04-25 DIAGNOSIS — Z00.6 ENCOUNTER FOR EXAMINATION FOR NORMAL COMPARISON OR CONTROL IN CLINICAL RESEARCH PROGRAM: ICD-10-CM

## 2025-04-25 DIAGNOSIS — M22.2X1 PATELLOFEMORAL PAIN SYNDROME OF RIGHT KNEE: Primary | ICD-10-CM

## 2025-04-25 DIAGNOSIS — Z79.899 ENCOUNTER FOR LONG-TERM (CURRENT) USE OF MEDICATIONS: ICD-10-CM

## 2025-04-25 DIAGNOSIS — E78.9 DISORDER OF LIPOPROTEIN AND LIPID METABOLISM: ICD-10-CM

## 2025-04-25 LAB
ALBUMIN SERPL BCG-MCNC: 4.4 G/DL (ref 3.5–5)
ALP SERPL-CCNC: 79 U/L (ref 34–104)
ALT SERPL W P-5'-P-CCNC: 20 U/L (ref 7–52)
ANION GAP SERPL CALCULATED.3IONS-SCNC: 8 MMOL/L (ref 4–13)
AST SERPL W P-5'-P-CCNC: 23 U/L (ref 13–39)
BASOPHILS # BLD AUTO: 0.01 THOUSANDS/ÂΜL (ref 0–0.1)
BASOPHILS NFR BLD AUTO: 0 % (ref 0–1)
BILIRUB SERPL-MCNC: 0.26 MG/DL (ref 0.2–1)
BUN SERPL-MCNC: 14 MG/DL (ref 5–25)
CALCIUM SERPL-MCNC: 9.4 MG/DL (ref 8.4–10.2)
CHLORIDE SERPL-SCNC: 104 MMOL/L (ref 96–108)
CHOLEST SERPL-MCNC: 155 MG/DL (ref ?–200)
CO2 SERPL-SCNC: 26 MMOL/L (ref 21–32)
CREAT SERPL-MCNC: 0.72 MG/DL (ref 0.6–1.3)
EOSINOPHIL # BLD AUTO: 0.06 THOUSAND/ÂΜL (ref 0–0.61)
EOSINOPHIL NFR BLD AUTO: 1 % (ref 0–6)
ERYTHROCYTE [DISTWIDTH] IN BLOOD BY AUTOMATED COUNT: 13.2 % (ref 11.6–15.1)
EST. AVERAGE GLUCOSE BLD GHB EST-MCNC: 128 MG/DL
FERRITIN SERPL-MCNC: 10 NG/ML (ref 30–307)
FOLATE SERPL-MCNC: 11.5 NG/ML
GFR SERPL CREATININE-BSD FRML MDRD: 119 ML/MIN/1.73SQ M
GLUCOSE P FAST SERPL-MCNC: 98 MG/DL (ref 65–99)
HBA1C MFR BLD: 6.1 %
HCT VFR BLD AUTO: 38.9 % (ref 34.8–46.1)
HDLC SERPL-MCNC: 45 MG/DL
HGB BLD-MCNC: 11.8 G/DL (ref 11.5–15.4)
IMM GRANULOCYTES # BLD AUTO: 0.01 THOUSAND/UL (ref 0–0.2)
IMM GRANULOCYTES NFR BLD AUTO: 0 % (ref 0–2)
IRON SATN MFR SERPL: 16 % (ref 15–50)
IRON SERPL-MCNC: 60 UG/DL (ref 50–212)
LDLC SERPL CALC-MCNC: 91 MG/DL (ref 0–100)
LYMPHOCYTES # BLD AUTO: 1.9 THOUSANDS/ÂΜL (ref 0.6–4.47)
LYMPHOCYTES NFR BLD AUTO: 41 % (ref 14–44)
MCH RBC QN AUTO: 28.4 PG (ref 26.8–34.3)
MCHC RBC AUTO-ENTMCNC: 30.3 G/DL (ref 31.4–37.4)
MCV RBC AUTO: 94 FL (ref 82–98)
MONOCYTES # BLD AUTO: 0.27 THOUSAND/ÂΜL (ref 0.17–1.22)
MONOCYTES NFR BLD AUTO: 6 % (ref 4–12)
NEUTROPHILS # BLD AUTO: 2.43 THOUSANDS/ÂΜL (ref 1.85–7.62)
NEUTS SEG NFR BLD AUTO: 52 % (ref 43–75)
NONHDLC SERPL-MCNC: 110 MG/DL
NRBC BLD AUTO-RTO: 0 /100 WBCS
PLATELET # BLD AUTO: 335 THOUSANDS/UL (ref 149–390)
PMV BLD AUTO: 10.6 FL (ref 8.9–12.7)
POTASSIUM SERPL-SCNC: 4.7 MMOL/L (ref 3.5–5.3)
PROT SERPL-MCNC: 6.9 G/DL (ref 6.4–8.4)
RBC # BLD AUTO: 4.16 MILLION/UL (ref 3.81–5.12)
SODIUM SERPL-SCNC: 138 MMOL/L (ref 135–147)
TIBC SERPL-MCNC: 375.2 UG/DL (ref 250–450)
TRANSFERRIN SERPL-MCNC: 268 MG/DL (ref 203–362)
TRIGL SERPL-MCNC: 97 MG/DL (ref ?–150)
TSH SERPL DL<=0.05 MIU/L-ACNC: 0.94 UIU/ML (ref 0.45–4.5)
UIBC SERPL-MCNC: 315 UG/DL (ref 155–355)
VIT B12 SERPL-MCNC: 213 PG/ML (ref 180–914)
WBC # BLD AUTO: 4.68 THOUSAND/UL (ref 4.31–10.16)

## 2025-04-25 PROCEDURE — 36415 COLL VENOUS BLD VENIPUNCTURE: CPT

## 2025-04-25 PROCEDURE — 80307 DRUG TEST PRSMV CHEM ANLYZR: CPT

## 2025-04-25 PROCEDURE — 82746 ASSAY OF FOLIC ACID SERUM: CPT

## 2025-04-25 PROCEDURE — 83540 ASSAY OF IRON: CPT

## 2025-04-25 PROCEDURE — 80061 LIPID PANEL: CPT

## 2025-04-25 PROCEDURE — 86780 TREPONEMA PALLIDUM: CPT

## 2025-04-25 PROCEDURE — 80053 COMPREHEN METABOLIC PANEL: CPT

## 2025-04-25 PROCEDURE — 85025 COMPLETE CBC W/AUTO DIFF WBC: CPT

## 2025-04-25 PROCEDURE — 83036 HEMOGLOBIN GLYCOSYLATED A1C: CPT

## 2025-04-25 PROCEDURE — 97110 THERAPEUTIC EXERCISES: CPT | Performed by: PHYSICAL THERAPIST

## 2025-04-25 PROCEDURE — 82607 VITAMIN B-12: CPT

## 2025-04-25 PROCEDURE — 84443 ASSAY THYROID STIM HORMONE: CPT

## 2025-04-25 PROCEDURE — 82728 ASSAY OF FERRITIN: CPT

## 2025-04-25 PROCEDURE — 83550 IRON BINDING TEST: CPT

## 2025-04-25 NOTE — PROGRESS NOTES
"Daily Note     Today's date: 2025  Patient name: Mira Jameson  : 2000  MRN: [id number]  Referring provider: Jones Chin*  Dx:   Encounter Diagnosis     ICD-10-CM    1. Patellofemoral pain syndrome of right knee  M22.2X1       Start Time: 0840  Stop Time: 0925  Total time in clinic (min): 45 minutes    Subjective: Patient reports some leg soreness after last visit but otherwise doing better.   1 on 1 with PT for 23 minutes. Remaining time independent fitness program.    Objective: See treatment diary below    Assessment: Tolerated treatment well. Patient reported some fatigue with treatment progression today. Cueing to avoid valgum with single leg based exercises. No reporting of symptom irritation.     Plan: Continue per plan of care.        Insurance:  AMA/CMS Eval/ Re-eval POC expires Auth #/ Referral # Total    Start date  Expiration date Extension  Visit limitation?  PT only or  PT+OT? Co-Insurance   CMS 25 needed                         AUTH #:  Date 4.1 4.14 4. 4.23 4.25          Authed: Used 1 2 3 4 5           Remaining  11 10 9 8 7              HEP: S/L hip abd., SLR flexion, bridge, clam shell      Precautions: prediabetic  Patient provided verbal consent to treatment plan and recommended interventions.    Manuals 4.1 4.14 4.21 4.23 4.25    visit 1 2 3 4 5                               Neuro Re-Ed         Stand hip abd  3x10 ea. GTB   3x10 ea. GTB    sidestepping   3x2 laps, 12' GTB  3x2 laps, 12' GTB    Slider to side   2x10 ea.  2x10 ea.             Ther Ex         Mini-squat 1x10 HEP 3x12, 10# med ball  3x10 w/ 20# db    SLS R LE only 2x10 3x10 w/ 20# db   Uni STS 2x10 ea.    Lateral step down  2x10, 6\" step 3x10, 6\" step  2x10, 6\" step 10# ea. hand    Anterior step down    2x10 6\" block step 2x10, 6\" RLE    Forward step up    2x10 on 6\" block step holding 10# contralateral R LE only     Leg press  3x10, 155-165# resume 2x10 145#     Lunges    2x10 lateral/bwd w/ " TRX 3x10 ea. Bwd, w/TRX                      Ther Activity

## 2025-04-26 LAB — TREPONEMA PALLIDUM IGG+IGM AB [PRESENCE] IN SERUM OR PLASMA BY IMMUNOASSAY: NORMAL

## 2025-04-27 ENCOUNTER — RESULTS FOLLOW-UP (OUTPATIENT)
Age: 25
End: 2025-04-27

## 2025-04-28 ENCOUNTER — OFFICE VISIT (OUTPATIENT)
Dept: PHYSICAL THERAPY | Facility: CLINIC | Age: 25
End: 2025-04-28
Payer: COMMERCIAL

## 2025-04-28 DIAGNOSIS — M22.2X1 PATELLOFEMORAL PAIN SYNDROME OF RIGHT KNEE: Primary | ICD-10-CM

## 2025-04-28 PROCEDURE — 97112 NEUROMUSCULAR REEDUCATION: CPT | Performed by: PHYSICAL THERAPIST

## 2025-04-28 PROCEDURE — 97110 THERAPEUTIC EXERCISES: CPT | Performed by: PHYSICAL THERAPIST

## 2025-04-28 NOTE — PROGRESS NOTES
"Daily Note     Today's date: 2025  Patient name: Mira Jameson  : 2000  MRN: [id number]  Referring provider: Jones Chin*  Dx:   Encounter Diagnosis     ICD-10-CM    1. Patellofemoral pain syndrome of right knee  M22.2X1       Start Time: 1000  Stop Time: 1045  Total time in clinic (min): 45 minutes    Subjective: Patient reports knee has been doing better but reports bilateral heel pain after standing for long period of time barefoot in kitchen yesterday.    1 on 1 with PT for 23 minutes. Remaining time independent fitness program.    Objective: See treatment diary below    Assessment: Tolerated treatment well. Patient reported leg fatigue after progression in exercises today. Cueing for distributing weight to mid foot with most exercises.     Plan: Continue per plan of care.        Insurance:  AMA/CMS Eval/ Re-eval POC expires Auth #/ Referral # Total    Start date  Expiration date Extension  Visit limitation?  PT only or  PT+OT? Co-Insurance   CMS 25 needed                         AUTH #:  Date 4.1 4.14 4.21 4.23 4.25 4.28         Authed: Used 1 2 3 4 5 6          Remaining  11 10 9 8 7 6             HEP: S/L hip abd., SLR flexion, bridge, clam shell      Precautions: prediabetic  Patient provided verbal consent to treatment plan and recommended interventions.    Manuals 4.14 4.21 4.23 4.25 4.28   visit 2 3 4 5 6                           Neuro Re-Ed        Stand hip abd 3x10 ea. GTB   3x10 ea. GTB 3x10 ea. GTB   sidestepping  3x2 laps, 12' GTB  3x2 laps, 12' GTB 3x2 laps, 12' GTB   Slider to side  2x10 ea.  2x10 ea. 2x10 ea.   Slider behind     2x10 ea.                    Ther Ex        Mini-squat 3x12, 10# med ball  3x10 w/ 20# db    SLS R LE only 2x10 3x10 w/ 20# db     Uni STS 2x10 ea.    SLR flex w/abd     3x10   Lateral step down 2x10, 6\" step 3x10, 6\" step  2x10, 6\" step 10# ea. hand    Anterior step down   2x10 6\" block step 2x10, 6\" RLE 3x10, 6\" ea.   Leg press 3x10, " 155-165# resume 2x10 145#     Lunges   2x10 lateral/bwd w/ TRX 3x10 ea. Bwd, w/TRX 3x10 ea. Bwd, w/TRX   Heel raises     3x12   Stand calf stretch     3x20''   Ther Activity

## 2025-04-30 ENCOUNTER — OFFICE VISIT (OUTPATIENT)
Dept: PHYSICAL THERAPY | Facility: CLINIC | Age: 25
End: 2025-04-30
Payer: COMMERCIAL

## 2025-04-30 DIAGNOSIS — M22.2X1 PATELLOFEMORAL PAIN SYNDROME OF RIGHT KNEE: Primary | ICD-10-CM

## 2025-04-30 LAB
6-ACETYLMORPHINE IA: NEGATIVE NG/ML
ACCEPTABLE CREAT UR QL: 196 MG/DL
AMPHET UR QL SCN: NEGATIVE NG/ML
BARBITURATES UR QL SCN: NEGATIVE NG/ML
BENZODIAZ UR QL SCN: NEGATIVE NG/ML
BUPRENORPHINE UR QL CFM: NEGATIVE NG/ML
CANNABINOIDS UR QL SCN: NEGATIVE NG/ML
CARISOPRODOL UR QL: NEGATIVE NG/ML
COCAINE+BZE UR QL SCN: NEGATIVE NG/ML
ETHYL GLUCURONIDE UR QL SCN: NEGATIVE NG/ML
FENTANYL UR QL SCN: NEGATIVE NG/ML
GABAPENTIN SERPLBLD QL SCN: NEGATIVE UG/ML
METHADONE UR QL SCN: NEGATIVE NG/ML
NITRITE UR QL STRIP: NEGATIVE UG/ML
OPIATES UR QL SCN: NEGATIVE NG/ML
OXYCODONE+OXYMORPHONE UR QL SCN: NEGATIVE NG/ML
PCP UR QL SCN: NEGATIVE NG/ML
PROPOXYPH UR QL SCN: NEGATIVE NG/ML
SPECIMEN PH ACCEPTABLE UR: 6.4 (ref 4.5–8.9)
TAPENTADOL UR QL SCN: NEGATIVE NG/ML
TRAMADOL UR QL SCN: NEGATIVE NG/ML

## 2025-04-30 PROCEDURE — 97110 THERAPEUTIC EXERCISES: CPT | Performed by: PHYSICAL THERAPIST

## 2025-04-30 NOTE — PROGRESS NOTES
Daily Note     Today's date: 2025  Patient name: Mira Jameson  : 2000  MRN: [id number]  Referring provider: Jones Chin*  Dx:   Encounter Diagnosis     ICD-10-CM    1. Patellofemoral pain syndrome of right knee  M22.2X1       Start Time: 1000  Stop Time: 1040  Total time in clinic (min): 40 minutes    Subjective: Patient reports to having twisted her ankle yesterday when trying to melanie after her cats at home. States it was swollen at night but was still able to walk. Reports knee is doing much better and using brace less.     1 on 1 with PT for 23 minutes. Remaining time independent fitness program.    Objective: See treatment diary below    Symmetrical ankle dorsiflexion ROM    Assessment: Tolerated treatment well. Assessed patient this session secondary to c/o R ankle pain. Patient exhibits TTP to right ATFL and CFL and mild swelling on lateral aspect of right ankle compared to left ankle. Patient ambulates around clinic with antalgic gait pattern and decreased step/stride length. Patient able to perform standing squats and HR interventions with minimal c/o pain. Educated to ice and perform ankle pumps which patient verbalized understanding. Will continue to monitor patient symptoms nv.    Plan: Continue per plan of care.        Insurance:  AMA/CMS Eval/ Re-eval POC expires Auth #/ Referral # Total    Start date  Expiration date Extension  Visit limitation?  PT only or  PT+OT? Co-Insurance   CMS 25 needed                         AUTH #:  Date 4.1 4.14 4.21 4.23 4.25 4.28 4.30        Authed: Used 1 2 3 4 5 6 7         Remaining  11 10 9 8 7 6 5            HEP: S/L hip abd., SLR flexion, bridge, clam shell      Precautions: prediabetic  Patient provided verbal consent to treatment plan and recommended interventions.    Manuals 4.14 4.21 4.23 4.25 4.28 4.30   visit 2 3 4 5 6 7                              Neuro Re-Ed         Stand hip abd 3x10 ea. GTB   3x10 ea. GTB 3x10 ea. GTB   "  sidestepping  3x2 laps, 12' GTB  3x2 laps, 12' GTB 3x2 laps, 12' GTB    Slider to side  2x10 ea.  2x10 ea. 2x10 ea.    Slider behind     2x10 ea.                       Ther Ex         Mini-squat 3x12, 10# med ball  3x10 w/ 20# db    SLS R LE only 2x10 3x10 w/ 20# db     Uni STS 2x10 ea.  3x10   SLR flex w/abd     3x10 4x10   Hip abd      Stand 3x12   Lateral step down 2x10, 6\" step 3x10, 6\" step  2x10, 6\" step 10# ea. hand     Anterior step down   2x10 6\" block step 2x10, 6\" RLE 3x10, 6\" ea.    Leg press 3x10, 155-165# resume 2x10 145#      Lunges   2x10 lateral/bwd w/ TRX 3x10 ea. Bwd, w/TRX 3x10 ea. Bwd, w/TRX    Heel raises     3x12 3x10   Stand calf stretch     3x20''    Assess      5'   Ther Activity                                "

## 2025-05-01 ENCOUNTER — OFFICE VISIT (OUTPATIENT)
Dept: OBGYN CLINIC | Facility: CLINIC | Age: 25
End: 2025-05-01
Payer: COMMERCIAL

## 2025-05-01 ENCOUNTER — OFFICE VISIT (OUTPATIENT)
Age: 25
End: 2025-05-01

## 2025-05-01 VITALS
WEIGHT: 205 LBS | HEART RATE: 90 BPM | HEIGHT: 64 IN | SYSTOLIC BLOOD PRESSURE: 123 MMHG | BODY MASS INDEX: 35 KG/M2 | OXYGEN SATURATION: 97 % | DIASTOLIC BLOOD PRESSURE: 85 MMHG

## 2025-05-01 VITALS — BODY MASS INDEX: 35 KG/M2 | WEIGHT: 205 LBS | HEIGHT: 64 IN

## 2025-05-01 DIAGNOSIS — M22.2X1 PATELLOFEMORAL PAIN SYNDROME OF RIGHT KNEE: Primary | ICD-10-CM

## 2025-05-01 DIAGNOSIS — E66.811 CLASS 1 OBESITY WITH BODY MASS INDEX (BMI) OF 34.0 TO 34.9 IN ADULT, UNSPECIFIED OBESITY TYPE, UNSPECIFIED WHETHER SERIOUS COMORBIDITY PRESENT: ICD-10-CM

## 2025-05-01 DIAGNOSIS — R73.03 PREDIABETES: ICD-10-CM

## 2025-05-01 DIAGNOSIS — E28.2 PCOS (POLYCYSTIC OVARIAN SYNDROME): Primary | ICD-10-CM

## 2025-05-01 DIAGNOSIS — J45.40 MODERATE PERSISTENT ASTHMA WITHOUT COMPLICATION: ICD-10-CM

## 2025-05-01 DIAGNOSIS — R21 RASH AND NONSPECIFIC SKIN ERUPTION: ICD-10-CM

## 2025-05-01 DIAGNOSIS — S93.401A MILD ANKLE SPRAIN, RIGHT, INITIAL ENCOUNTER: ICD-10-CM

## 2025-05-01 DIAGNOSIS — F32.A DEPRESSION, UNSPECIFIED DEPRESSION TYPE: ICD-10-CM

## 2025-05-01 DIAGNOSIS — M72.2 PLANTAR FASCIITIS, BILATERAL: ICD-10-CM

## 2025-05-01 PROCEDURE — 99213 OFFICE O/P EST LOW 20 MIN: CPT | Performed by: ORTHOPAEDIC SURGERY

## 2025-05-01 PROCEDURE — 99214 OFFICE O/P EST MOD 30 MIN: CPT | Performed by: FAMILY MEDICINE

## 2025-05-01 RX ORDER — BENZOCAINE/MENTHOL 6 MG-10 MG
LOZENGE MUCOUS MEMBRANE 4 TIMES DAILY PRN
Qty: 144 G | Refills: 0 | Status: SHIPPED | OUTPATIENT
Start: 2025-05-01

## 2025-05-01 NOTE — ASSESSMENT & PLAN NOTE
BMI 35.19, related to PCOS   Lifestyle modification, extensive nutrition and exercise counseling  Pt is amenable to referral to weight management today. Reports she has been following lifestyle modifications but is not noticing change in weight   Pt is motivated to lose weight and has encouragement and support from significant other  Pt also with mild snoring but declines referral to sleep medicine at this time for NEVIN dx, would like to work on losing weight first   Increase metformin        Orders:    Ambulatory Referral to Weight Management; Future

## 2025-05-01 NOTE — PROGRESS NOTES
Name: Mira Jameson      : 2000      MRN: [id number]  Encounter Provider: Jones Chin MD  Encounter Date: 2025   Encounter department: [facility]  :  Assessment & Plan  Patellofemoral pain syndrome of right knee  Mira upon examination and review of the physical therapy notes is doing quite well with regard to the patellofemoral syndrome of her right knee.  I did encourage her to continue physical therapy.  I did provide her with a new prescription for this today.  I did reiterate to her today that this is a long process with regard to building her strength back up secondary to her and deconditioning from her injury.  She demonstrates full range of motion and stability with functional strength in all planes.  It is recommended to gradually ramp up her activities to tolerance as she continues to build her strength with physical therapy and home exercises.  She may utilize ice after activities as well as oral analgesics if needed.  She verbalized understanding and was amenable to the treatment plan presented to her today.  I will see her back on an as-needed basis with regards to her right knee.  Orders:    Ambulatory Referral to Physical Therapy; Future    Plantar fasciitis, bilateral  Mira also has complaints consistent with plantar fasciitis of both feet.  I did also advise that therapy is very beneficial for this as well as wearing supportive shoes.  I did advise that when she needs a new pair of walking or running shoes she should seek the services of a running store to have a properly supportive shoe fit to her.  This can be utilized instead of a prescription orthotic.  Therapy exercises for strengthening and stretching of the calves and feet are advisable for this.  Wearing padded shoes inside of her home if she has at home with hardwood floors is also recommended to alleviate pressure onto the feet.  She verbalized understanding and had no  further questions.  I will see her back in regards to her plantar fasciitis.       Mild ankle sprain, right, initial encounter  Mira appears to be doing well with regards to her lateral ankle sprain.  Her ankle demonstrates functional range of motion and strength in all planes on exam.  She is also grossly stable.  I did advise that she can continue with therapy exercises physical therapy as well as at home to continue to build the strength.  I did emphasize the importance of building the stabilizing strength of her ankle as she is at increased risk of recurrent injury of the ankle.  I did note that a recurrent injury can typically be worse than the initial injury.  She does not have any specific restrictions.  I do not recommend an ankle brace at this time as it can make her stiff.  If she fails to have symptomatic relief or has worsening symptoms over the next 4 to 6 weeks with her ankle she may follow-up.  Images such as x-ray will be obtained at that time.  MRI of the right ankle will also be considered to question ligamentous injury.                No follow-ups on file.    I have personally reviewed pertinent imaging in PACS.  No new images reviewed today    History: Mira Jameson is a 24 y.o. female presents for follow-up evaluation of her right knee.  She has been treated operatively for meniscus tear and patellofemoral syndrome.  She has been participating in physical therapy over the past month initiated on April 1, 2025 with her last session of April 30, 2025.  She states that she does feel that she has improved with overall function and pain tolerance with her knee.  However, notes that she is still experiencing residual discomfort anteriorly particularly with repetitive ascending descending stairs or prolonged standing. She describes it as a faint ache with a grinding sensation. She states that she has cut back to 30 minute walks. She did recently have a ankle injury while chasing her cats at her home  "resulting in a lateral ankle sprain on the right side.  She does feel that this did exacerbate some the underlying pain of her knee but is not significant.  She did modify her physical therapy to accommodate the ankle injury.  She does note some swelling laterally but overall notes that she is stable.  She denies a prior history of injury on the right side.  However, notes that she has had injuries to the left ankle from sports.  She was given home exercises for her right ankle and does feel that she is able to do them on her own.  She does also feel that continued physical therapy for her right knee would also be beneficial.  Today she denies any distal paresthesias.    Mira also has complaints of pain to the plantar aspect of both of her feet towards the arches of her feet.  She states that this is exacerbated with prolonged standing particularly on a hard surface floor.  She does utilize sketchers while walking but does not acknowledge that they are significantly supportive.  She does typically wear crocs when she is out and about.  She denies any prior history of plantar fasciitis or injury of her feet.  She denies any distal paresthesias.      Estimated body mass index is 35.19 kg/m² as calculated from the following:    Height as of this encounter: 5' 4\" (1.626 m).    Weight as of this encounter: 93 kg (205 lb).    Lab Results   Component Value Date    HGBA1C 6.1 (H) 04/25/2025       Social History     Occupational History    Not on file   Tobacco Use    Smoking status: Former     Current packs/day: 0.25     Types: Cigarettes    Smokeless tobacco: Never   Vaping Use    Vaping status: Former    Substances: Nicotine, Flavoring   Substance and Sexual Activity    Alcohol use: Not Currently    Drug use: Not Currently     Comment: former marijuana use    Sexual activity: Yes     Partners: Male     Birth control/protection: None       Objective:  Right Ankle Exam     Tenderness   The patient is experiencing " tenderness in the ATF and CF.  Swelling: mild (lateral)    Range of Motion   Dorsiflexion:  25   Plantar flexion:  45   Eversion:  25   Inversion:  30     Muscle Strength   Dorsiflexion:  5/5  Plantar flexion:  5/5  Anterior tibial:  5/5  Posterior tibial:  5/5  Gastrocsoleus:  5/5  Peroneal muscle:  5/5    Tests   Anterior drawer: negative  Varus tilt: negative    Other   Erythema: absent  Scars: absent  Sensation: normal  Pulse: present       Right Knee Exam     Range of Motion   Extension:  0   Flexion:  120     Tests   Varus: negative Valgus: negative    Other   Erythema: absent  Scars: absent  Sensation: normal  Pulse: present  Swelling: none  Effusion: no effusion present          Observations     Right Knee   Negative for effusion.     Right Ankle/Foot   Negative for adhesive scar.     Strength/Myotome Testing     Right Ankle/Foot   Dorsiflexion: 5  Plantar flexion: 5      There were no vitals filed for this visit.    Subjective:  Past Medical History:   Diagnosis Date    Asthma     Bipolar and related disorder (HCC)     Diabetes mellitus (HCC)     Eczema     Schizo affective schizophrenia (HCC)     provided by friends/family       Past Surgical History:   Procedure Laterality Date    APPENDECTOMY      APPENDECTOMY LAPAROSCOPIC N/A 01/07/2025    Procedure: APPENDECTOMY LAPAROSCOPIC;  Surgeon: Deonte Hart MD;  Location: [facility];  Service: General       Family History   Adopted: Yes   Problem Relation Age of Onset    Diabetes Mother     Bipolar disorder Mother     Schizoaffective Disorder  Mother          Current Outpatient Medications:     albuterol (ACCUNEB) 1.25 MG/3ML nebulizer solution, Take 3 mL (1.25 mg total) by nebulization every 6 (six) hours as needed for wheezing or shortness of breath, Disp: 3 mL, Rfl: 5    albuterol (PROVENTIL HFA,VENTOLIN HFA) 90 mcg/act inhaler, Inhale 2 puffs every 6 (six) hours as needed for wheezing, Disp: 18 g, Rfl: 3    ARIPiprazole (ABILIFY) 5 mg tablet, Take 5 mg by  mouth daily (Patient taking differently: Take 10 mg by mouth daily PRN), Disp: , Rfl:     budesonide-formoterol (SYMBICORT) 160-4.5 mcg/act inhaler, Inhale 2 puffs 2 (two) times a day Rinse mouth after use., Disp: 10.2 g, Rfl: 5    fluticasone (FLONASE) 50 mcg/act nasal spray, 1 spray into each nostril daily, Disp: 16 g, Rfl: 2    loratadine (CLARITIN) 10 mg tablet, Take 1 tablet (10 mg total) by mouth daily, Disp: 30 tablet, Rfl: 2    metFORMIN (GLUCOPHAGE) 500 mg tablet, Take 1 tablet (500 mg total) by mouth in the morning, Disp: 90 tablet, Rfl: 1    mupirocin (BACTROBAN) 2 % ointment, Apply topically 3 (three) times a day, Disp: 15 g, Rfl: 0    pantoprazole (PROTONIX) 40 mg tablet, take 1 tablet by mouth once daily, Disp: 90 tablet, Rfl: 1    psyllium (METAMUCIL) 58.6 % packet, Take 1 packet by mouth daily, Disp: , Rfl:     senna-docusate sodium (SENOKOT-S) 8.6-50 mg per tablet, Take 1 tablet by mouth daily, Disp: 7 tablet, Rfl: 0    ALPRAZolam (XANAX) 1 mg tablet, Take 1 tablet (1 mg total) by mouth once for 1 dose 20-30 minutes prior to scheduled MRI., Disp: 1 tablet, Rfl: 0    ciprofloxacin-dexamethasone (CIPRODEX) otic suspension, Administer 4 drops into both ears 2 (two) times a day for 5 days, Disp: 7.5 mL, Rfl: 0    polyethylene glycol (MIRALAX) 17 g packet, Take 17 g by mouth daily (Patient not taking: Reported on 5/1/2025), Disp: 238 g, Rfl: 0    Allergies   Allergen Reactions    Cat Dander Shortness Of Breath and Itching    Shellfish-Derived Products - Food Allergy Throat Swelling       Review of Systems   Constitutional:  Positive for activity change. Negative for chills, fever and unexpected weight change.   HENT:  Negative for hearing loss, nosebleeds and sore throat.    Eyes:  Negative for pain, redness and visual disturbance.   Respiratory:  Negative for cough, shortness of breath and wheezing.    Cardiovascular:  Negative for chest pain, palpitations and leg swelling.   Gastrointestinal:  Negative  for abdominal pain, nausea and vomiting.   Endocrine: Negative for polydipsia and polyuria.   Genitourinary:  Negative for dysuria and hematuria.   Musculoskeletal:  See HPI  Skin:  Negative for rash and wound.   Neurological:  Negative for dizziness, numbness and headaches.   Psychiatric/Behavioral:  Negative for decreased concentration and suicidal ideas. The patient is not nervous/anxious.      Physical Exam  Vitals and nursing note reviewed.   Constitutional:       Appearance: Normal appearance. She is well-developed.   HENT:      Head: Normocephalic and atraumatic.      Right Ear: External ear normal.      Left Ear: External ear normal.   Eyes:      General: No scleral icterus.     Extraocular Movements: Extraocular movements intact.      Conjunctiva/sclera: Conjunctivae normal.   Cardiovascular:      Rate and Rhythm: Normal rate.   Pulmonary:      Effort: Pulmonary effort is normal. No respiratory distress.   Musculoskeletal:      Cervical back: Normal range of motion and neck supple.      Comments: See Ortho exam   Skin:     General: Skin is warm and dry.   Neurological:      General: No focal deficit present.      Mental Status: She is alert and oriented to person, place, and time.   Psychiatric:         Behavior: Behavior normal.     Scribe Attestation      I,:  Luis Whatley am acting as a scribe while in the presence of the attending physician.:       I,:  Jones Chin MD personally performed the services described in this documentation    as scribed in my presence.:           This document was created using speech voice recognition software.   Grammatical errors, random word insertions, pronoun errors, and incomplete sentences are an occasional consequence of this system due to software limitations, ambient noise, and hardware issues.   Any formal questions or concerns about content, text, or information contained within the body of this dictation should be directly addressed to the provider  for clarification.

## 2025-05-01 NOTE — ASSESSMENT & PLAN NOTE
"PFT 4/22/25 but pt was started on maintenance inhaler beforehand   \"Normal spirometry.  No post bronchodilator response.  Mild restriction as indicated by the lung volumes.  Normal corrected diffusion capacity.  Normal flow-volume loop\"    Pt reports improvement. Still has difficulties with physical exertion   Continue current inhaler regimen symbicort 2 puffs BID and albuterol inhaler and neb as needed. Reports only needing neb once in the past month.   Continue to monitor symptoms and signs  Currently better controlled than previous              "

## 2025-05-01 NOTE — ASSESSMENT & PLAN NOTE
Meets [place] Criteria for PCOS based on oligomenorrhea and clinical signs of hyperandrogenism such as hirsutism. Has not have pelvic ultrasound. Pt also has elevated HbA1c of 6.1 and depression. Recent labwork reviewed  - Pt tolerates metformin well. Increase to 1000mg daily at this time.   - continue lifestyle management, diet and weight loss  - pt continues to decline hormonal contraceptive at this time to regularize menstrual cycle. She is hesitant to start it. LMP 4/10/25 heavy and cramping, did re-discuss OCP. Pt will consider if she wants to meet with ob gyn either our clinic or referral out   - pt wants to lose weight prior to attempting pregnancy. Pt had previously declined referral to fertility management as she is not interested in pregnancy at this time and is still young. Once pt has lost weight if menstrual is still not regulated and still not conceiving, would then further discuss options such as clomiphene or hormonal treatment and referral to [facility]     Orders:    metFORMIN (GLUCOPHAGE) 1000 MG tablet; Take 1 tablet (1,000 mg total) by mouth daily with breakfast

## 2025-05-01 NOTE — ASSESSMENT & PLAN NOTE
Pt reported diagnosis of bipolar, schizoaffective d/o and ADHD. Pt stopped taking medications including abilify and trazodone as she lacked insurance. Pt is working with psychiatry for diagnoses and medication management   Pt was started on abilify recently. Pt thinks rash may be due to that  Pt also thinks current manic episode is due to that medication as told to her by psychiatrist     Pt will call psychiatrist regarding abilify, possibility of rash reaction. Pt has upcoming appt with her 5/19/25   Consider dosage adjustment and adjunct medication  Current manic episode, pt reports overall controlled and significant other is supportive   Strict rtc/ED precautions.

## 2025-05-01 NOTE — ASSESSMENT & PLAN NOTE
4/25/25 HbA1c at 6.1 unchanged from previous  Increase metformin 500 mg to 1000 mg daily, initially started due to concomitant PCOS   Pt reports tolerating well, GI side effects have resolved   Lifestyle management, nutrition and exercise counseling     Orders:    metFORMIN (GLUCOPHAGE) 1000 MG tablet; Take 1 tablet (1,000 mg total) by mouth daily with breakfast

## 2025-05-01 NOTE — PROGRESS NOTES
Name: Mira Jameson      : 2000      MRN: [id number]  Encounter Provider: Celena Mcgill MD  Encounter Date: 2025   Encounter department: [facility]  :  Assessment & Plan  PCOS (polycystic ovarian syndrome)  Meets [place] Criteria for PCOS based on oligomenorrhea and clinical signs of hyperandrogenism such as hirsutism. Has not have pelvic ultrasound. Pt also has elevated HbA1c of 6.1 and depression. Recent labwork reviewed  - Pt tolerates metformin well. Increase to 1000mg daily at this time.   - continue lifestyle management, diet and weight loss  - pt continues to decline hormonal contraceptive at this time to regularize menstrual cycle. She is hesitant to start it. LMP 4/10/25 heavy and cramping, did re-discuss OCP. Pt will consider if she wants to meet with ob gyn either our clinic or referral out   - pt wants to lose weight prior to attempting pregnancy. Pt had previously declined referral to fertility management as she is not interested in pregnancy at this time and is still young. Once pt has lost weight if menstrual is still not regulated and still not conceiving, would then further discuss options such as clomiphene or hormonal treatment and referral to [facility]     Orders:    metFORMIN (GLUCOPHAGE) 1000 MG tablet; Take 1 tablet (1,000 mg total) by mouth daily with breakfast    Class 1 obesity with body mass index (BMI) of 34.0 to 34.9 in adult, unspecified obesity type, unspecified whether serious comorbidity present  BMI 35.19, related to PCOS   Lifestyle modification, extensive nutrition and exercise counseling  Pt is amenable to referral to weight management today. Reports she has been following lifestyle modifications but is not noticing change in weight   Pt is motivated to lose weight and has encouragement and support from significant other  Pt also with mild snoring but declines referral to sleep medicine at this time for NEVIN dx, would like to work on  "losing weight first   Increase metformin        Orders:    Ambulatory Referral to Weight Management; Future    Rash and nonspecific skin eruption  Allergic reaction rash  Possibly related to cat dander, or pt is concerned of reaction to abilify  Regarding abilify will discuss with psychiatrist trialing off to see if rash resolved  Pt is unwilling to rid of cats. Continue allergie medications  Continue claritin. Continue oatmeal and eczema creams   Add hydrocortisone 1% cream prn for rash and itching, advised use sparingly and caution with face     Orders:    hydrocortisone 1 % cream; Apply topically 4 (four) times a day as needed for rash    Prediabetes  4/25/25 HbA1c at 6.1 unchanged from previous  Increase metformin 500 mg to 1000 mg daily, initially started due to concomitant PCOS   Pt reports tolerating well, GI side effects have resolved   Lifestyle management, nutrition and exercise counseling     Orders:    metFORMIN (GLUCOPHAGE) 1000 MG tablet; Take 1 tablet (1,000 mg total) by mouth daily with breakfast    Depression, unspecified depression type  Pt reported diagnosis of bipolar, schizoaffective d/o and ADHD. Pt stopped taking medications including abilify and trazodone as she lacked insurance. Pt is working with psychiatry for diagnoses and medication management   Pt was started on abilify recently. Pt thinks rash may be due to that  Pt also thinks current manic episode is due to that medication as told to her by psychiatrist     Pt will call psychiatrist regarding abilify, possibility of rash reaction. Pt has upcoming appt with her 5/19/25   Consider dosage adjustment and adjunct medication  Current manic episode, pt reports overall controlled and significant other is supportive   Strict rtc/ED precautions.          Moderate persistent asthma without complication  PFT 4/22/25 but pt was started on maintenance inhaler beforehand   \"Normal spirometry.  No post bronchodilator response.  Mild restriction as " "indicated by the lung volumes.  Normal corrected diffusion capacity.  Normal flow-volume loop\"    Pt reports improvement. Still has difficulties with physical exertion   Continue current inhaler regimen symbicort 2 puffs BID and albuterol inhaler and neb as needed. Reports only needing neb once in the past month.   Continue to monitor symptoms and signs  Currently better controlled than previous                     History of Present Illness   HPI  23 yo female presenting for f/u chronic conditions. Presenting with significant other.     No concerns right now besides weight and PCOS. Is unsure if cats are causing her hives or recent change in dosage of abilify.     Weight hasn't gone up or down. Pt says she is not eating unhealthy. Wondering if she is bloated. Drinking more water, so constipation has not been as severe as before. Constipation seems to be a lot better.   LMP 4/10-4/15. Before that June 2024. This one was very heavy, size up for period pads because leaking, very painful, all day constantly. Pt says period bothers her because she does not get warning when it comes and when it came she was wearing white pants. Discussed again hormonal birth control but pt reports she wants to see if can do without birth control. Not planning on kids right now but she is hesitant because something she hasn't been on birth control before. Mood changes sometimes around period but pt says this is not a guarantee.   HbA1c is the same on recent labwork. Wondering if needs to increase on metformin.     Started breaking out in rash. Cats sleeping with her for months but says that they have slept with her before and she did not get rash. Itchy rash if touch or scratch it. Sometimes burning feeling. Face and thighs and sometimes back. Wondering if it's abilify. But has been on abilify before. Had questionable things happen before, weight gain, hair loss, didn't have rash, justa increasing. Currently justa but pt states she can " "handle it. Craving bad habits like cigarettes and vaping that she normally doesn't crave. Spends a lot. Also hasn't slept in 2 days, feels it creeping up. Body is tired but brain feels wide awake. Feels like able to keep it at bay but needs to talk to psychiatrist/ Psychiatrist had said abilify works well for schizoaffective d/o, most effective tx for paranoia and anxiety. Did say would increase justa so start on low dosage and build back up. And then consider adjunct tx. Next appt this month 19th.   Has been using eczema cream which is helping. Oatmeal around face. Claritin helps.     Tried to do laps around the house. Had to stop because of chest pain. Steroid inhaler 2 puffs twice a day has been helping. Only use albuterol rescue as needed. Nebulizer once this past month because really bad allergies.     Review of Systems   Constitutional:  Negative for chills and fever.   HENT:  Negative for ear pain and sore throat.    Eyes:  Negative for pain and visual disturbance.   Respiratory:  Negative for cough and shortness of breath.    Cardiovascular:  Negative for chest pain and palpitations.   Gastrointestinal:  Positive for constipation. Negative for abdominal pain and vomiting.   Genitourinary:  Positive for menstrual problem. Negative for dysuria and hematuria.   Musculoskeletal:  Negative for arthralgias and back pain.   Skin:  Positive for rash. Negative for color change.   Neurological:  Negative for seizures and syncope.   Psychiatric/Behavioral:  Positive for dysphoric mood and sleep disturbance. The patient is hyperactive.    All other systems reviewed and are negative.      Objective   /85 (BP Location: Left arm, Patient Position: Sitting, Cuff Size: Standard)   Pulse 90   Ht 5' 4\" (1.626 m)   Wt 93 kg (205 lb)   SpO2 97%   BMI 35.19 kg/m²      Physical Exam  Vitals reviewed.   Constitutional:       General: She is not in acute distress.     Appearance: Normal appearance. She is well-developed. " She is obese.   HENT:      Head: Normocephalic and atraumatic.      Right Ear: External ear normal.      Left Ear: External ear normal.      Nose: Nose normal.      Mouth/Throat:      Mouth: Mucous membranes are moist.      Pharynx: Oropharynx is clear.   Eyes:      Extraocular Movements: Extraocular movements intact.      Conjunctiva/sclera: Conjunctivae normal.   Cardiovascular:      Rate and Rhythm: Normal rate and regular rhythm.      Pulses: Normal pulses.      Heart sounds: Normal heart sounds. No murmur heard.  Pulmonary:      Effort: Pulmonary effort is normal. No respiratory distress.      Breath sounds: Normal breath sounds. No wheezing or rales.   Musculoskeletal:         General: No swelling. Normal range of motion.      Cervical back: Neck supple.   Skin:     General: Skin is warm and dry.      Capillary Refill: Capillary refill takes less than 2 seconds.      Findings: Rash present.   Neurological:      General: No focal deficit present.      Mental Status: She is alert. Mental status is at baseline.           Celena Mcgill MD  [facility]  Date: 5/1/2025 Time: 2:16 PM

## 2025-05-05 ENCOUNTER — OFFICE VISIT (OUTPATIENT)
Dept: PHYSICAL THERAPY | Facility: CLINIC | Age: 25
End: 2025-05-05
Payer: COMMERCIAL

## 2025-05-05 DIAGNOSIS — M22.2X1 PATELLOFEMORAL PAIN SYNDROME OF RIGHT KNEE: Primary | ICD-10-CM

## 2025-05-05 PROCEDURE — 97110 THERAPEUTIC EXERCISES: CPT | Performed by: PHYSICAL THERAPIST

## 2025-05-05 NOTE — PROGRESS NOTES
"Daily Note     Today's date: 2025  Patient name: Mira Jameson  : 2000  MRN: [id number]  Referring provider: Jones Chin*  Dx:   Encounter Diagnosis     ICD-10-CM    1. Patellofemoral pain syndrome of right knee  M22.2X1       Start Time: 1010  Stop Time: 1055  Total time in clinic (min): 45 minutes    Subjective: Patient reports that she continues to have bilateral heel pain and some knee discomfort with stair navigation. Heel pain present even when sitting, described as burning.     Objective: See treatment diary below    Symmetrical ankle dorsiflexion ROM    Assessment: Tolerated treatment well. Press up exercise helped with reporting of bilateral heel pain.     Plan: Continue per plan of care.        Insurance:  AMA/CMS Eval/ Re-eval POC expires Auth #/ Referral # Total    Start date  Expiration date Extension  Visit limitation?  PT only or  PT+OT? Co-Insurance   CMS 25 needed                         AUTH #:  Date 4.1 4.14 4.21 4.23 4.25 4.28 4.30 5.5       Authed: Used 1 2 3 4 5 6 7         Remaining  11 10 9 8 7 6 5            HEP: S/L hip abd., SLR flexion, bridge, clam shell      Precautions: prediabetic  Patient provided verbal consent to treatment plan and recommended interventions.    Manuals 4.23 4.25 4.28 4.30 5.5   visit 4 5 6 7 8                           Neuro Re-Ed        Stand hip abd  3x10 ea. GTB 3x10 ea. GTB     sidestepping  3x2 laps, 12' GTB 3x2 laps, 12' GTB  2x4 laps, 10' Blue TB   Slider to side  2x10 ea. 2x10 ea.     Slider behind   2x10 ea.                      Ther Ex        Mini-squat 3x10 w/ 20# db    SLS R LE only 2x10 3x10 w/ 20# db     Uni STS 2x10 ea.  3x10 3x10 on BOSU   SLR flex w/abd   3x10 4x10    Hip abd    Stand 3x12 3x10 ea. Blue TB   Lateral step down  2x10, 6\" step 10# ea. hand   3x10, 6\"   Anterior step down 2x10 6\" block step 2x10, 6\" RLE 3x10, 6\" ea.     Leg press 2x10 145#       Lunges 2x10 lateral/bwd w/ TRX 3x10 ea. Bwd, w/TRX 3x10 " ea. Bwd, w/TRX  3x10 fwd onto imeem   Leg extension machine     3x10, 44-55#   Heel raises   3x12 3x10    Stand calf stretch   3x20''     Assess    5'    Press up     10x                           Ther Activity

## 2025-05-06 LAB
APOB+LDLR+PCSK9 GENE MUT ANL BLD/T: NOT DETECTED
BRCA1+BRCA2 DEL+DUP + FULL MUT ANL BLD/T: NOT DETECTED
MLH1+MSH2+MSH6+PMS2 GN DEL+DUP+FUL M: NOT DETECTED

## 2025-05-07 ENCOUNTER — OFFICE VISIT (OUTPATIENT)
Dept: PHYSICAL THERAPY | Facility: CLINIC | Age: 25
End: 2025-05-07
Attending: ORTHOPAEDIC SURGERY
Payer: COMMERCIAL

## 2025-05-07 DIAGNOSIS — M22.2X1 PATELLOFEMORAL PAIN SYNDROME OF RIGHT KNEE: Primary | ICD-10-CM

## 2025-05-07 PROCEDURE — 97110 THERAPEUTIC EXERCISES: CPT | Performed by: PHYSICAL THERAPIST

## 2025-05-07 NOTE — PROGRESS NOTES
"Daily Note     Today's date: 2025  Patient name: Mira Jameson  : 2000  MRN: [id number]  Referring provider: Jones Chin*  Dx:   Encounter Diagnosis     ICD-10-CM    1. Patellofemoral pain syndrome of right knee  M22.2X1       Start Time: 1005  Stop Time: 1055  Total time in clinic (min): 50 minutes    Subjective: Patient reports that her right knee is irritated today once she woke up and had trouble sleeping last night. Reports heels have been doing better. Felt okay after last session. Reports helping carry bags which may have irritated her knee.     Objective: See treatment diary below    Assessment: Tolerated treatment well. Patient reported her knee felt better post session. Good tolerance to strengthening progression.     Plan: Continue per plan of care.        Insurance:  A/CMS Eval/ Re-eval POC expires Auth #/ Referral # Total    Start date  Expiration date Extension  Visit limitation?  PT only or  PT+OT? Co-Insurance   CMS 25 needed                         AUTH #:  Date 4.1 4.14 4.21 4.23 4.25 4.28 4.30 5.5 5.7      Authed: Used 1 2 3 4 5 6 7 8 9       Remaining  11 10 9 8 7 6 5 4 3          HEP: S/L hip abd., SLR flexion, bridge, clam shell      Precautions: prediabetic  Patient provided verbal consent to treatment plan and recommended interventions.    Manuals 4.25 4.28 4.30 5.5 5.7   visit 5 6 7 8 9                           Neuro Re-Ed        Stand hip abd 3x10 ea. GTB 3x10 ea. GTB   3x10 BTB ea.   sidestepping 3x2 laps, 12' GTB 3x2 laps, 12' GTB  2x4 laps, 10' Blue TB 2x2 laps, 10' Blue TB monster walk   Slider to side 2x10 ea. 2x10 ea.   2x10 LLE moving   Slider behind  2x10 ea.    2x10 LLE moving   Slider to front     2x10 LLE moving           Ther Ex        Mini-squat 3x10 w/ 20# db Uni STS 2x10 ea.  3x10 3x10 on BOSU 3x10 on BOSU   SLR flex w/abd  3x10 4x10  3x12   Hip abd   Stand 3x12 3x10 ea. Blue TB 3x10 ea. Blue TB   Lateral step down 2x10, 6\" step 10# ea. " "hand   3x10, 6\"    Leg press        Lunges 3x10 ea. Bwd, w/TRX 3x10 ea. Bwd, w/TRX  3x10 fwd onto BOSU 3x10 fwd onto BOSU   Leg extension machine    3x10, 44-55# 3x10, 55#   Heel raises  3x12 3x10     Press up    10x                            Ther Activity                             "

## 2025-05-12 ENCOUNTER — APPOINTMENT (OUTPATIENT)
Dept: PHYSICAL THERAPY | Facility: CLINIC | Age: 25
End: 2025-05-12
Attending: ORTHOPAEDIC SURGERY
Payer: COMMERCIAL

## 2025-05-12 DIAGNOSIS — M22.2X1 PATELLOFEMORAL PAIN SYNDROME OF RIGHT KNEE: Primary | ICD-10-CM

## 2025-05-12 NOTE — PROGRESS NOTES
Daily Note     Today's date: 2025  Patient name: Mira Jameson  : 2000  MRN: [id number]  Referring provider: Jones Chin*  Dx:   Encounter Diagnosis     ICD-10-CM    1. Patellofemoral pain syndrome of right knee  M22.2X1                  Subjective: Patient reports that her right knee is irritated today once she woke up and had trouble sleeping last night. Reports heels have been doing better. Felt okay after last session. Reports helping carry bags which may have irritated her knee.     Goals  Short term goals- 4 weeks (MET)  -Patient will increase LE muscle strength by 1/2 MMT  -Patient will demonstrate a decrease in pain <4 during function  -Patient will be able to ascend steps in reciprocal pattern without limitation    Long term goal- 8 weeks (In process)  -Patient will exhibit no pain during functional mobility- met  -Patient will demonstrate normal gait pattern with stair navigation  -Patient will displays 5/5 MMT in 8 weeks quad strength  -Patient will be able to exercising for health without pain       Recurrent problem     Quality of life: good     Patient Goals  Patient goals for therapy: return to sport/leisure activities, decreased pain and increased strength  Patient goal: Return to running/walking >1 hour    Pain  Current pain ratin  At best pain ratin  At worst pain rating: 10  Location: R knee  Quality: needle-like and sharp  Relieving factors: rest  Aggravating factors: stair climbing and walking     Social Support  Steps to enter house: yes  5  Stairs in house: yes   15     Objective      Lumbar AROM  Flexion:  WFL  Extension:  WFL  Left lateral flexion:  WFL  Right lateral flexion:  WFL  Left rotation:  WFL  Right rotation:  WFL      GAIT: Patient exhibits slight antalgic gait pattern with reciprocal LE movements  Squat assess: Increased pain with deep squat, bilateral toe in noted  ¼ squat assess: slight genu valgum RLE               MMT     Hip         R          " L   Flex. 4- 4   Ext       Abd. 4 4   Add. 4 4   IR.       ER.                      MMT          AROM          PROM     Knee      R          L         R          L           R         L   Flex. 4- 4 125 125       Extn. 4- 4 0 0 5 5                MMT     Ankle         R          L   PF 4 4   DF. 4 4   EHL 4 4      Palpation: No tenderness along joint line     Knee:   Valgus stress test=(-)  varus stress test =    Isabelle test =   Thessaly= (-)  apprehension test=  -  joint findings= WNL    Assessment: ***    Plan: 4-6 weeks, 2 visits per week focusing on LE strengthening.        Insurance:  [employer]/CMS Eval/ Re-eval POC expires Auth #/ Referral # Total    Start date  Expiration date Extension  Visit limitation?  PT only or  PT+OT? Co-Insurance   CMS 4/1/25 7/1/25 needed                         AUTH #:  Date 4.1 4.14 4.21 4.23 4.25 4.28 4.30 5.5 5.7 5.12     Authed: Used 1 2 3 4 5 6 7 8 9 10      Remaining  11 10 9 8 7 6 5 4 3 2         HEP: S/L hip abd., SLR flexion, bridge, clam shell  Precautions: prediabetic  Patient provided verbal consent to treatment plan and recommended interventions.    Manuals 4.28 4.30 5.5 5.7 5.12   visit 6 7 8 9 10                           Neuro Re-Ed        Stand hip abd 3x10 ea. GTB   3x10 BTB ea.    sidestepping 3x2 laps, 12' GTB  2x4 laps, 10' Blue TB 2x2 laps, 10' Blue TB monster walk    Slider to side 2x10 ea.   2x10 LLE moving    Slider behind 2x10 ea.    2x10 LLE moving    Slider to front    2x10 LLE moving            Ther Ex        Mini-squat  3x10 3x10 on BOSU 3x10 on BOSU    SLR flex w/abd 3x10 4x10  3x12    Hip abd  Stand 3x12 3x10 ea. Blue TB 3x10 ea. Blue TB    Lateral step down   3x10, 6\"     Leg press        Lunges 3x10 ea. Bwd, w/TRX  3x10 fwd onto BOSU 3x10 fwd onto BOSU    Leg extension machine   3x10, 44-55# 3x10, 55#    Heel raises 3x12 3x10      Press up   10x                             Ther Activity                             "

## 2025-05-15 ENCOUNTER — APPOINTMENT (OUTPATIENT)
Dept: PHYSICAL THERAPY | Facility: CLINIC | Age: 25
End: 2025-05-15
Attending: ORTHOPAEDIC SURGERY
Payer: COMMERCIAL

## 2025-05-15 DIAGNOSIS — M22.2X1 PATELLOFEMORAL PAIN SYNDROME OF RIGHT KNEE: Primary | ICD-10-CM

## 2025-05-19 ENCOUNTER — OFFICE VISIT (OUTPATIENT)
Dept: PHYSICAL THERAPY | Facility: CLINIC | Age: 25
End: 2025-05-19
Attending: ORTHOPAEDIC SURGERY
Payer: COMMERCIAL

## 2025-05-19 DIAGNOSIS — M22.2X1 PATELLOFEMORAL PAIN SYNDROME OF RIGHT KNEE: Primary | ICD-10-CM

## 2025-05-19 PROCEDURE — 97112 NEUROMUSCULAR REEDUCATION: CPT | Performed by: PHYSICAL THERAPIST

## 2025-05-19 PROCEDURE — 97110 THERAPEUTIC EXERCISES: CPT | Performed by: PHYSICAL THERAPIST

## 2025-05-19 NOTE — PROGRESS NOTES
Re-evluation    Today's date: 2025  Patient name: Mira Jameson  : 2000  MRN: [id number]  Referring provider: Jones Chin*  Dx:   Encounter Diagnosis     ICD-10-CM    1. Patellofemoral pain syndrome of right knee  M22.2X1       Start Time: 08  Stop Time: 928  Total time in clinic (min): 43 minutes    Subjective: Patient reports that her right knee has been doing better and irritated her only when walking for long period of time yesterday (> 30 minutes)after twisting her ankle on Thursday. Reports that she was able to put weight on ankle but this significantly hindered her ambulation. Wearing ankle brace has helped tremendously.     Goals  Short term goals- 4 weeks (MET)  -Patient will increase LE muscle strength by 1/2 MMT  -Patient will demonstrate a decrease in pain <4 during function- met  -Patient will be able to ascend steps in reciprocal pattern without limitation- met    Long term goal- 8 weeks (In process)  -Patient will exhibit no pain during functional mobility- met  -Patient will demonstrate normal gait pattern with stair navigation- 75% met.  -Patient will displays 5/5 MMT in 8 weeks quad strength- not met  -Patient will be able to exercising for health without pain. 75% met       Recurrent problem     Quality of life: good     Patient Goals  Patient goals for therapy: return to sport/leisure activities, decreased pain and increased strength  Patient goal: Return to running/walking >1 hour    Pain  Current pain ratin  At best pain ratin  At worst pain ratin  Location: R knee  Quality: sore/achy  Relieving factors: rest  Aggravating factors: stair climbing and walking     Social Support  Steps to enter house: yes  5  Stairs in house: yes   15     Objective      Lumbar AROM  Flexion:  WFL  Extension:  WFL  Left lateral flexion:  WFL  Right lateral flexion:  WFL  Left rotation:  WFL  Right rotation:  WFL      GAIT: Patient exhibits slight antalgic gait pattern with  reciprocal LE movements  Squat assess: Increased pain with deep squat, bilateral toe in noted  ¼ squat assess: slight genu valgum RLE               MMT     Hip         R          L   Flex. 4 4   Ext       Abd. 4 4   Add. 4 4   IR.       ER.                      MMT          AROM          PROM     Knee      R          L         R          L           R         L   Flex. 4+ 5 125 125       Extn. 4+ 4- 0 0 5 5                MMT     Ankle         R          L   PF 4p! 5   DF. 5 5   EHL 5 5      Palpation: No tenderness along joint line     Knee:   Valgus stress test=(-)  varus stress test =    Isabelle test =   Thessaly= (-)  apprehension test=  -  joint findings= WNL    Assessment: Exercises modified due to patient recent ankle sprain/injury. No ankle laxity at this time. Overall patient is making great gains with regard to knee strength and overall functional. She has improved with tolerance to exercise, walking, and stair navigation.     Plan: 6 weeks, 2 visits per week focusing on LE strengthening.        Insurance:  A/CMS Eval/ Re-eval POC expires Auth #/ Referral # Total    Start date  Expiration date Extension  Visit limitation?  PT only or  PT+OT? Co-Insurance   CMS 4/1/25 7/1/25 needed                         AUTH #:  Date 4.1 4.14 4.21 4.23 4.25 4.28 4.30 5.5 5.7 5.19     Authed: Used 1 2 3 4 5 6 7 8 9 10      Remaining  11 10 9 8 7 6 5 4 3 2         HEP: S/L hip abd., SLR flexion, bridge, clam shell  Precautions: prediabetic  Patient provided verbal consent to treatment plan and recommended interventions.    Manuals 4.28 4.30 5.5 5.7 5.19   visit 6 7 8 9 10                           Neuro Re-Ed        Stand hip abd 3x10 ea. GTB   3x10 BTB ea. 3x10 BTB ea.   sidestepping 3x2 laps, 12' GTB  2x4 laps, 10' Blue TB 2x2 laps, 10' Blue TB monster walk 2x2 laps, 10' Blue TB    Slider to side 2x10 ea.   2x10 LLE moving 2x10 LLE moving   Slider behind 2x10 ea.    2x10 LLE moving 2x10 LLE moving   Slider to front     "2x10 LLE moving 2x10 LLE moving   SLS     5x15\" RLE                           Ther Ex        Mini-squat  3x10 3x10 on BOSU 3x10 on BOSU 3x10 no ball   SLR flex w/abd 3x10 4x10  3x12    Lateral step down   3x10, 6\"     Lunges 3x10 ea. Bwd, w/TRX  3x10 fwd onto BOSU 3x10 fwd onto BOSU 3x10 fwd onto BOSU   Leg extension machine   3x10, 44-55# 3x10, 55#    Heel raises 3x12 3x10      Press up   10x                             Ther Activity        Pt ed     FB                "

## 2025-05-20 ENCOUNTER — TELEPHONE (OUTPATIENT)
Age: 25
End: 2025-05-20

## 2025-05-20 ENCOUNTER — TELEPHONE (OUTPATIENT)
Dept: BARIATRICS | Facility: CLINIC | Age: 25
End: 2025-05-20

## 2025-05-20 NOTE — TELEPHONE ENCOUNTER
Left message on machine to call office back to reschedule appointment with Dr. Sal for July 3 due to provider's change in schedule.

## 2025-05-22 ENCOUNTER — OFFICE VISIT (OUTPATIENT)
Dept: PHYSICAL THERAPY | Facility: CLINIC | Age: 25
End: 2025-05-22
Attending: ORTHOPAEDIC SURGERY
Payer: COMMERCIAL

## 2025-05-22 DIAGNOSIS — M22.2X1 PATELLOFEMORAL PAIN SYNDROME OF RIGHT KNEE: Primary | ICD-10-CM

## 2025-05-22 PROCEDURE — 97110 THERAPEUTIC EXERCISES: CPT | Performed by: PHYSICAL THERAPIST

## 2025-05-22 PROCEDURE — 97112 NEUROMUSCULAR REEDUCATION: CPT | Performed by: PHYSICAL THERAPIST

## 2025-05-22 NOTE — PROGRESS NOTES
"Daily Note     Today's date: 2025  Patient name: Mira Jameson  : 2000  MRN: [id number]  Referring provider: Jones Chin*  Dx:   Encounter Diagnosis     ICD-10-CM    1. Patellofemoral pain syndrome of right knee  M22.2X1       Start Time: 1010  Stop Time: 1055  Total time in clinic (min): 45 minutes  Subjective: Patient reports doing okay entering treatment today. Reports knee and ankle are holding up.     Objective: See treatment diary below    Assessment: Tolerated treatment well. Patient reported feeling okay post session. Slight fatigue reported progression in weight strengthening exercises.    Plan: Continue per plan of care.        Insurance:  AMA/CMS Eval/ Re-eval POC expires Auth #/ Referral # Total    Start date  Expiration date Extension  Visit limitation?  PT only or  PT+OT? Co-Insurance   CMS 25 needed                         AUTH #:  Date 4.1 4.14 4.21 4.23 4.25 4.28 4.30 5.5 5.7 5.19 5.22    Authed: Used   1 2 3 4 5 6 7 8 9 10 11     Remaining  11 10 9 8 7 6 5 4 3 2 1        HEP: S/L hip abd., SLR flexion, bridge, clam shell  Precautions: prediabetic  Patient provided verbal consent to treatment plan and recommended interventions.    Manuals 4.30 5.5 5.7 5.19 5.22   visit 7 8 9 10 11                           Neuro Re-Ed        Stand hip abd   3x10 BTB ea. 3x10 BTB ea. 3x10 BTB ea.   sidestepping  2x4 laps, 10' Blue TB 2x2 laps, 10' Blue TB monster walk 2x2 laps, 10' Blue TB  2x2 laps, 20' Blue TB, 2x2 laps, 20' Blue TB monster wall     Slider to side   2x10 LLE moving 2x10 LLE moving 3x10 LLE moving   Slider behind   2x10 LLE moving 2x10 LLE moving 3x10 LLE moving   Slider to front   2x10 LLE moving 2x10 LLE moving 3x10 LLE moving   SLS    5x15\" RLE 5x15'' RLE   Hip burner     2x10, 3\" LLE at wall                   Ther Ex        Mini-squat 3x10 3x10 on BOSU 3x10 on BOSU 3x10 no ball    Lateral step down  3x10, 6\"      Lunges  3x10 fwd onto BOSU 3x10 fwd onto BOSU " 3x10 fwd onto [employer]    Leg extension machine  3x10, 44-55# 3x10, 55#  3x10, 55#   Leg press       3x10, 205#   Uni leg press     2x10, 205# ea.           Ther Activity        Pt ed    FB

## 2025-05-27 ENCOUNTER — OFFICE VISIT (OUTPATIENT)
Dept: PHYSICAL THERAPY | Facility: CLINIC | Age: 25
End: 2025-05-27
Attending: ORTHOPAEDIC SURGERY
Payer: COMMERCIAL

## 2025-05-27 DIAGNOSIS — M22.2X1 PATELLOFEMORAL PAIN SYNDROME OF RIGHT KNEE: Primary | ICD-10-CM

## 2025-05-27 PROCEDURE — 97110 THERAPEUTIC EXERCISES: CPT | Performed by: PHYSICAL THERAPIST

## 2025-05-27 PROCEDURE — 97112 NEUROMUSCULAR REEDUCATION: CPT | Performed by: PHYSICAL THERAPIST

## 2025-05-27 NOTE — PROGRESS NOTES
Daily Note     Today's date: 2025  Patient name: Mira Jameson  : 2000  MRN: [id number]  Referring provider: Jones Chin*  Dx:   Encounter Diagnosis     ICD-10-CM    1. Patellofemoral pain syndrome of right knee  M22.2X1       Start Time: 0845  Stop Time: 0930  Total time in clinic (min): 45 minutes  Subjective: Patient reports doing well at this time and reports no functional limitations.   1 on 1 with PT for 30 minutes. Remaining time independent fitness program.      Objective: See treatment diary below    Assessment: Tolerated treatment well. Patient reported no irritation with exercises today. Plan for discharge next visit.     Plan: Continue per plan of care.        Insurance:  AMA/CMS Eval/ Re-eval POC expires Auth #/ Referral # Total    Start date  Expiration date Extension  Visit limitation?  PT only or  PT+OT? Co-Insurance   CMS 25 needed                         AUTH #:  Date 4.1 4.14 4.21 4.23 4.25 4.28 4.30 5.5 5.7 5.19 5.22    Authed: Used   1 2 3 4 5 6 7 8 9 10 11     Remaining  11 10 9 8 7 6 5 4 3 2 1      AUTH #:  Date               Authed: 12V Used 1               Remaining  11                    HEP: S/L hip abd., SLR flexion, bridge, clam shell  Precautions: prediabetic  Patient provided verbal consent to treatment plan and recommended interventions.    Manuals 5.5 5.7 5.19 5.22 5.27   visit 8 9 10 11 12                           Neuro Re-Ed        Stand hip abd  3x10 BTB ea. 3x10 BTB ea. 3x10 BTB ea. 3x10 BTB ea.   sidestepping 2x4 laps, 10' Blue TB 2x2 laps, 10' Blue TB monster walk 2x2 laps, 10' Blue TB  2x2 laps, 20' Blue TB, 2x2 laps, 20' Blue TB monster wall   2x2 laps, 20' Blue TB, 2x2 laps, 20' Blue TB monster wall   Slider to side  2x10 LLE moving 2x10 LLE moving 3x10 LLE moving 3x10 LLE moving   Slider behind  2x10 LLE moving 2x10 LLE moving 3x10 LLE moving 3x10 LLE moving   Slider to front  2x10 LLE moving 2x10 LLE moving 3x10 LLE moving 3x10 LLE  "moving   SLS   5x15\" RLE 5x15'' RLE    Hip burner    2x10, 3\" LLE at wall 3x10, 3\" LLE at wall                   Ther Ex        Mini-squat 3x10 on BOSU 3x10 on BOSU 3x10 no ball     Lateral step down 3x10, 6\"       Lunges 3x10 fwd onto BOSU 3x10 fwd onto BOSU 3x10 fwd onto BOSU     Leg extension machine 3x10, 44-55# 3x10, 55#  3x10, 55# 3x10, 55#   Leg press      3x10, 205#    Uni leg press    2x10, 205# ea.            Ther Activity        Pt ed   FB                    "

## 2025-05-29 ENCOUNTER — OFFICE VISIT (OUTPATIENT)
Dept: PHYSICAL THERAPY | Facility: CLINIC | Age: 25
End: 2025-05-29
Attending: ORTHOPAEDIC SURGERY
Payer: COMMERCIAL

## 2025-05-29 DIAGNOSIS — M22.2X1 PATELLOFEMORAL PAIN SYNDROME OF RIGHT KNEE: Primary | ICD-10-CM

## 2025-05-29 PROCEDURE — 97112 NEUROMUSCULAR REEDUCATION: CPT | Performed by: PHYSICAL THERAPIST

## 2025-05-29 PROCEDURE — 97110 THERAPEUTIC EXERCISES: CPT | Performed by: PHYSICAL THERAPIST

## 2025-05-29 NOTE — PROGRESS NOTES
Discharge Summary    Today's date: 2025  Patient name: Mira Jameson  : 2000  MRN: [id number]  Referring provider: Jones Chin*  Dx:   Encounter Diagnosis     ICD-10-CM    1. Patellofemoral pain syndrome of right knee  M22.2X1       Start Time: 0845  Stop Time: 09  Total time in clinic (min): 40 minutes  Subjective: Patient reports no limitations at this time and feeling much improved. Reports normal stair navigation and being able to exercise at the gym.    Goals  Short term goals- 4 weeks (MET)  -Patient will increase LE muscle strength by 1/2 MMT  -Patient will demonstrate a decrease in pain <4 during function- met  -Patient will be able to ascend steps in reciprocal pattern without limitation- met     Long term goal- 8 weeks (MET)  -Patient will exhibit no pain during functional mobility- met  -Patient will demonstrate normal gait pattern with stair navigation- met  -Patient will displays 5/5 MMT in 8 weeks quad strength- not met  -Patient will be able to exercising for health without pain. met       Recurrent problem     Quality of life: good     Patient Goals- met  Patient goals for therapy: return to sport/leisure activities, decreased pain and increased strength  Patient goal: Return to running/walking >1 hour     Pain  Current pain ratin  At best pain ratin  At worst pain ratin  Location: R knee  Quality: sore/achy  Relieving factors: rest  Aggravating factors: stair climbing and walking     Social Support  Steps to enter house: yes  5  Stairs in house: yes   15     Objective      Lumbar AROM  Flexion:  WFL  Extension:  WFL  Left lateral flexion:  WFL  Right lateral flexion:  WFL  Left rotation:  WFL  Right rotation:  WFL      GAIT: normal gait pattern at this time  Squat assess: WNL  ¼ squat assess: WNL               MMT     Hip         R          L   Flex. 4+ 4+   Ext       Abd. 4 4   Add. 4 4                  MMT          AROM          PROM     Knee      R           "L         R          L           R         L   Flex. 5 5 125 125       Extn. 5 4+ 0 0 5 5                MMT     Ankle         R          L   PF 4+ 5   DF. 5 5   EHL 5 5       Assessment: Tolerated treatment well. Patient has met all goals set forth at this time. Full ROM and good strength displayed at this time. Patient is independent in HEP and is appropriate for discharge from therapy.     Plan: D/C from treatment to HEP.        Insurance:  AMA/CMS Eval/ Re-eval POC expires Auth #/ Referral # Total    Start date  Expiration date Extension  Visit limitation?  PT only or  PT+OT? Co-Insurance   CMS 4/1/25 7/1/25 needed                         AUTH #:  Date 4.1 4.14 4.21 4.23 4.25 4.28 4.30 5.5 5.7 5.19 5.22    Authed: Used   1 2 3 4 5 6 7 8 9 10 11     Remaining  11 10 9 8 7 6 5 4 3 2 1      AUTH #:  Date 5.27 5.29             Authed: 12V Used 1 2              Remaining  11 10                   HEP: S/L hip abd., SLR flexion, bridge, clam shell, lateral step down, squats    Precautions: prediabetic  Patient provided verbal consent to treatment plan and recommended interventions.    Manuals 5.7 5.19 5.22 5.27 5.29   visit 9 10 11 12 13                   Neuro Re-Ed        Stand hip abd 3x10 BTB ea. 3x10 BTB ea. 3x10 BTB ea. 3x10 BTB ea. 3x10 ea. Black TB   sidestepping 2x2 laps, 10' Blue TB monster walk 2x2 laps, 10' Blue TB  2x2 laps, 20' Blue TB, 2x2 laps, 20' Blue TB monster wall   2x2 laps, 20' Blue TB, 2x2 laps, 20' Blue TB monster wall 2x2 laps, 20' Black TB, 2x2 laps, 20' Black TB monster wall   Slider to side 2x10 LLE moving 2x10 LLE moving 3x10 LLE moving 3x10 LLE moving 3x10 LLE moving   Slider behind 2x10 LLE moving 2x10 LLE moving 3x10 LLE moving 3x10 LLE moving 3x10 LLE moving   Slider to front 2x10 LLE moving 2x10 LLE moving 3x10 LLE moving 3x10 LLE moving 3x10 LLE moving   SLS  5x15\" RLE 5x15'' RLE     Hip burner   2x10, 3\" LLE at wall 3x10, 3\" LLE at wall 3x10, 3\" LLE at wall                   Ther Ex "        Mini-squat 3x10 on BOSU 3x10 no ball      Lunges 3x10 fwd onto BOSU 3x10 fwd onto BOSU      Leg extension machine 3x10, 55#  3x10, 55# 3x10, 55# 3x10, 65#   Leg press   3x10, 205#     Uni leg press   2x10, 205# ea.  3x10 ea, 205#           Ther Activity        Pt ed  FB

## 2025-06-02 ENCOUNTER — OFFICE VISIT (OUTPATIENT)
Age: 25
End: 2025-06-02

## 2025-06-02 VITALS
OXYGEN SATURATION: 99 % | HEART RATE: 93 BPM | BODY MASS INDEX: 35.58 KG/M2 | SYSTOLIC BLOOD PRESSURE: 127 MMHG | TEMPERATURE: 98.8 F | DIASTOLIC BLOOD PRESSURE: 83 MMHG | WEIGHT: 207.3 LBS

## 2025-06-02 DIAGNOSIS — E66.811 CLASS 1 OBESITY WITH BODY MASS INDEX (BMI) OF 34.0 TO 34.9 IN ADULT, UNSPECIFIED OBESITY TYPE, UNSPECIFIED WHETHER SERIOUS COMORBIDITY PRESENT: ICD-10-CM

## 2025-06-02 DIAGNOSIS — Z31.89 ENCOUNTER FOR FERTILITY PLANNING: ICD-10-CM

## 2025-06-02 DIAGNOSIS — R73.03 PREDIABETES: ICD-10-CM

## 2025-06-02 DIAGNOSIS — E28.2 PCOS (POLYCYSTIC OVARIAN SYNDROME): ICD-10-CM

## 2025-06-02 DIAGNOSIS — N89.8 VAGINAL DISCHARGE: Primary | ICD-10-CM

## 2025-06-02 PROCEDURE — 81514 NFCT DS BV&VAGINITIS DNA ALG: CPT | Performed by: FAMILY MEDICINE

## 2025-06-02 PROCEDURE — 99214 OFFICE O/P EST MOD 30 MIN: CPT | Performed by: FAMILY MEDICINE

## 2025-06-02 NOTE — ASSESSMENT & PLAN NOTE
Meets [place] Criteria for PCOS based on oligomenorrhea and clinical signs of hyperandrogenism such as hirsutism. Has not have pelvic ultrasound. Pt also has elevated HbA1c of 6.1 and depression.   - currently taking metformin 1000mg daily   - continue lifestyle management, diet and weight loss  - Pt has been hesitant to start hormonal contraceptive to regularize menstrual cycle. She reports now that her periods have become more regular   - pt previously expressed wanting to lose weight prior to attempting pregnancy. Pt had previously declined referral to fertility management  Pt now expresses interested in pregnancy and requests referral. Start with endocrinology referral at this time  Of note pt's menstrual cycle has just become more regular and she admits she has not been sexually active with  often.  does report he has low testosterone and has had issues conceiving with other women in the past. Did discuss if they desire pregnancy and fail after attempting to conceive would consider [facility] referral     Orders:    Ambulatory Referral to Endocrinology; Future

## 2025-06-02 NOTE — PROGRESS NOTES
Name: Mira Jameson      : 2000      MRN: [id number]  Encounter Provider: Celena Mcgill MD  Encounter Date: 2025   Encounter department: [facility]  :  Assessment & Plan  Vaginal discharge  Pt notes odorous white thick discharge for the past few days and itchy in genital region   Speculum exam noted thin white discharge.   Molecular vaginal swab collected  Await results and treat appropriately  In the meantime discussed proper hygiene practices    Pt declines other STI testing, she is monogamous with      Orders:    Molecular Vaginal Panel    PCOS (polycystic ovarian syndrome)  Meets [place] Criteria for PCOS based on oligomenorrhea and clinical signs of hyperandrogenism such as hirsutism. Has not have pelvic ultrasound. Pt also has elevated HbA1c of 6.1 and depression.   - currently taking metformin 1000mg daily   - continue lifestyle management, diet and weight loss  - Pt has been hesitant to start hormonal contraceptive to regularize menstrual cycle. She reports now that her periods have become more regular   - pt previously expressed wanting to lose weight prior to attempting pregnancy. Pt had previously declined referral to fertility management  Pt now expresses interested in pregnancy and requests referral. Start with endocrinology referral at this time  Of note pt's menstrual cycle has just become more regular and she admits she has not been sexually active with  often.  does report he has low testosterone and has had issues conceiving with other women in the past. Did discuss if they desire pregnancy and fail after attempting to conceive would consider [facility] referral     Orders:    Ambulatory Referral to Endocrinology; Future    Class 1 obesity with body mass index (BMI) of 34.0 to 34.9 in adult, unspecified obesity type, unspecified whether serious comorbidity present  BMI 35.19, related to PCOS   Lifestyle modification  Previously  referred to weight management  Pt amenable to endocrinology referral today     Orders:    Ambulatory Referral to Endocrinology; Future    Prediabetes  4/25/25 HbA1c at 6.1 unchanged from previous  Pt taking metformin 1000 mg daily, initially started due to concomitant PCOS . Pt reports tolerating well, GI side effects have resolved   Lifestyle management, nutrition and exercise counseling  Pt amenable to endocrinology referral today       Orders:    Ambulatory Referral to Endocrinology; Future    Encounter for fertility planning  See above  Pt is asking about clomiphene  Did discuss previously if pt is interested in pregnancy would refer to specialist. Endocrinology referral, consider reproductive endocrinology if needed     Orders:    Ambulatory Referral to Endocrinology; Future           History of Present Illness   HPI  24 yo female presenting for vaginal discharge.     Concerned about constant vaginal discharge that started the past few days. States faint fishy smell, white and clumpy discharge. She is not sure if it is hormonal or if there is something wrong. Started a couple days ago, maybe last week. Thought normal discharge but started occurring more and more often and started getting fishy odor. Noticing on underwear or when wiping. Just uses water to clean. Used to use boric acid which used to help with odor when there, sometimes still uses. Does not douche, it used to give her yeast infections. Yeast infections in the past color was similar but more yellow, scent strong and thick. Pt said because she used latex at the time and realized she cannot use latex condoms. When stopped using latex, it got better.   Very itchy. Noticed clitoris is itchy. When trying to clean or wipe feels urge to scratch and scratching makes it feel better. Changes underwear every day and showers every day.   Periods started getting more regular.  Was taking probiotic because mom recommended it 2 weeks a  go, wasn't taking every  day, just took maybe a couple.     Vision has gotten a bit worse the past few months  Thinks it may be from her abilify. Dose increase in abilify correlated with vision change. Wears colored contacts but not prescription, just eye colors, doesn't wear all day every day. Did take a nap with them once by accident. Using eye drops for redness.      2-3 weeks ago experienced left ear bleeding/yellow discharge, painful and itchy, lasted about a week     Asking about clomiphene because thinking of pregnancy soon.   Pt's  reports that his endocrinologist many years ago had prescribed it due to his low testosterone     Review of Systems   Constitutional:  Negative for chills and fever.   HENT:  Negative for ear pain and sore throat.    Eyes:  Positive for visual disturbance. Negative for pain.   Respiratory:  Negative for cough and shortness of breath.    Cardiovascular:  Negative for chest pain and palpitations.   Gastrointestinal:  Negative for abdominal pain and vomiting.   Genitourinary:  Positive for vaginal discharge. Negative for dysuria, hematuria and menstrual problem.   Musculoskeletal:  Negative for arthralgias and back pain.   Skin:  Negative for color change and rash.   Neurological:  Negative for seizures and syncope.   All other systems reviewed and are negative.      Objective   /83 (BP Location: Left arm, Patient Position: Sitting, Cuff Size: Standard)   Pulse 93   Temp 98.8 °F (37.1 °C) (Tympanic)   Wt 94 kg (207 lb 4.8 oz)   SpO2 99%   BMI 35.58 kg/m²      Physical Exam  Vitals reviewed. Exam conducted with a chaperone present.   Constitutional:       General: She is not in acute distress.     Appearance: Normal appearance. She is well-developed. She is obese.   HENT:      Head: Normocephalic and atraumatic.      Right Ear: External ear normal.      Left Ear: External ear normal.      Nose: Nose normal.      Mouth/Throat:      Mouth: Mucous membranes are moist.      Pharynx: Oropharynx  is clear.     Eyes:      Extraocular Movements: Extraocular movements intact.      Conjunctiva/sclera: Conjunctivae normal.       Cardiovascular:      Rate and Rhythm: Normal rate and regular rhythm.   Pulmonary:      Effort: Pulmonary effort is normal. No respiratory distress.      Breath sounds: No rales.   Abdominal:      General: Bowel sounds are normal.   Genitourinary:     Pubic Area: No rash.       Labia:         Right: No rash or lesion.         Left: No rash or lesion.       Vagina: Vaginal discharge (thin white discharge noted) present.      Cervix: Normal.     Musculoskeletal:         General: No swelling. Normal range of motion.      Cervical back: Neck supple.     Skin:     General: Skin is warm and dry.      Capillary Refill: Capillary refill takes less than 2 seconds.      Findings: No rash.     Neurological:      General: No focal deficit present.      Mental Status: She is alert. Mental status is at baseline.           Celena Mcgill MD  [facility]  Date: 6/2/2025 Time: 8:21 PM

## 2025-06-02 NOTE — ASSESSMENT & PLAN NOTE
4/25/25 HbA1c at 6.1 unchanged from previous  Pt taking metformin 1000 mg daily, initially started due to concomitant PCOS . Pt reports tolerating well, GI side effects have resolved   Lifestyle management, nutrition and exercise counseling  Pt amenable to endocrinology referral today       Orders:    Ambulatory Referral to Endocrinology; Future

## 2025-06-02 NOTE — ASSESSMENT & PLAN NOTE
BMI 35.19, related to PCOS   Lifestyle modification  Previously referred to weight management  Pt amenable to endocrinology referral today     Orders:    Ambulatory Referral to Endocrinology; Future

## 2025-06-02 NOTE — PATIENT INSTRUCTIONS
Patient Education     Vaginal discharge   The Basics   Written by the doctors and editors at [facility]   What is vaginal discharge? -- This the fluid that comes out of the vagina (figure 1). Vaginal discharge is made up of cells from the vagina and cervix, bacteria, mucus, and water.  Can vaginal discharge be normal? -- Yes. Normally, people who get monthly periods can have a small amount of vaginal discharge each day. Normal vaginal discharge can be white, clear, or thick, but usually does not smell bad.  The amounts of vaginal discharge are different for each person. Also, it is normal to have more or less vaginal discharge at different times. For example, you might notice more discharge:   If you are pregnant   If you use birth control that contains hormones   During the 2 weeks before your period  If you have been through menopause, you will probably notice that you have less vaginal discharge. (Menopause is when you stop having monthly periods.)  When is vaginal discharge abnormal? -- Vaginal discharge is abnormal when it happens along with the following symptoms:   Itching of the vagina or the area around the vagina   Redness, pain, or swelling around the vagina   Discharge that is foamy, greenish-yellow, or bloody   Discharge that smells bad   Pain when urinating or having sex   Pain in the lower part of the belly   Fever  What are the causes of abnormal vaginal discharge? -- Different conditions can cause abnormal vaginal discharge. The most common causes are:   An infection in the vagina, cervix, or uterus   A reaction to something in the vagina, such as a tampon or condom   A reaction to a soap or other product that was in the vagina   Changes in the body that happen after menopause  Should I see a doctor or nurse? -- Yes. If you have abnormal vaginal discharge, see a doctor or nurse so they can try to figure out the cause. They will talk with you and do an exam. They will also take a sample of your vaginal  Post Partum Progress Note -  Section    Subjective:   The patient feels well, has no complaints.  Ambulating:  yes  Voiding: yes  Passing flatus: yes, no BM yet  Tolerating regular diet: yes  Pain well controlled: yes.    Subjective pain rating: 3 out of 10  Lochia: normal    Infant status: well  Feeding via bottle, formula. Plans to start pumping today. Encouraged initiating pumping consistently to stimulate production.    Objective:     Vitals:    22 2310 22 0032 22 0317 22 0800   BP: 114/65 118/76 115/76 112/72   BP Location: Right arm Right arm Right arm    Pulse: 94 69 71 72   Resp: 16 16 16 16   Temp: 99.1  F (37.3  C) 98  F (36.7  C) 98.9  F (37.2  C) 97.9  F (36.6  C)   TempSrc: Oral Oral Oral Oral   SpO2: 99%  98% 98%       Physical Exam:  General: No acute distress, alert and oriented X 3.   Heart: RRR, No murmurs, rubs or gallop.   Lungs: Clear breath sounds bilaterally.  Adequate effort.   Abdomen:  Soft, firm fundus at umbilicus. Appropriately tender.     Incision  bandage in place without strike through   Breast No erythema.  No evidence of infection   Extremities no calf tenderness; edema: none   Perineum N/A   Lochia  None/Light       Assessment & Plan   Tiffany Vargas is a 39 year old  s/p  Primary LTCD for fetal intolerance to labor at 39w0d on 2022.     1.  Post Operative Day 1  - meeting postop goals  - Discussed importance of ambulation, increasing PO intake, and use of incentive spirometer.  - bandage in place, no strikethrough. Okay to remove this evening and patient may shower.  2.  Activity:  Encouraged pelvic rest x6 weeks.  3.  Continue post partum care    Disposition: anticipate discharge to home either POD2 or POD3    Aljeandra Hatch MD  [facility]  2022 9:24 AM     discharge, then do lab tests on the sample to look for an infection.  Do not try to treat abnormal vaginal discharge by yourself. It could cause your symptoms to get worse.  How is abnormal vaginal discharge treated? -- Treatment depends on the cause of your abnormal vaginal discharge. For example, different vaginal infections are treated with different medicines. If you have a vaginal infection, your doctor or nurse will want to figure out what type of infection you have so they can treat it with the right medicine.  If your abnormal vaginal discharge is caused by certain types of infections, your sex partner(s) will also need to see a doctor for treatment. (You might want to stop having sex until you know what is causing your symptoms.)  Can abnormal vaginal discharge be prevented? -- Sometimes. You can help prevent abnormal vaginal discharge if you:   Use warm water and unscented non-soap cleanser to wash your vulva (the vulva is the area of skin around the outside of the vagina).   Take baths in plain warm water. Do not use scented bath products.   Do not use sprays or powders on your vagina.   Do not douche (put liquid inside your vagina to rinse it out).   Do not wipe with baby wipes or scented toilet paper after you use the toilet.  All topics are updated as new evidence becomes available and our peer review process is complete.  This topic retrieved from [employer] on: Feb 26, 2024.  Topic 75967 Version 13.0  Release: 32.2.4 - C32.56  © 2024 UpToDate, Inc. and/or its affiliates. All rights reserved.  figure 1: Adult female external genitalia     This drawing shows the different parts of the genitals.  Graphic 71804 Version 9.0  Consumer Information Use and Disclaimer   Disclaimer: This generalized information is a limited summary of diagnosis, treatment, and/or medication information. It is not meant to be comprehensive and should be used as a tool to help the user understand and/or assess potential diagnostic  and treatment options. It does NOT include all information about conditions, treatments, medications, side effects, or risks that may apply to a specific patient. It is not intended to be medical advice or a substitute for the medical advice, diagnosis, or treatment of a health care provider based on the health care provider's examination and assessment of a patient's specific and unique circumstances. Patients must speak with a health care provider for complete information about their health, medical questions, and treatment options, including any risks or benefits regarding use of medications. This information does not endorse any treatments or medications as safe, effective, or approved for treating a specific patient. UpToDate, Inc. and its affiliates disclaim any warranty or liability relating to this information or the use thereof.The use of this information is governed by the Terms of Use, available at https://www.woltersQuiduwer.com/en/know/clinical-effectiveness-terms. 2024© UpToDate, Inc. and its affiliates and/or licensors. All rights reserved.  Copyright   © 2024 UpToDate, Inc. and/or its affiliates. All rights reserved.

## 2025-06-04 LAB
C GLABRATA DNA VAG QL NAA+PROBE: NEGATIVE
C KRUSEI DNA VAG QL NAA+PROBE: NEGATIVE
CANDIDA SP 6 PNL VAG NAA+PROBE: NEGATIVE
T VAGINALIS DNA VAG QL NAA+PROBE: NEGATIVE
VAGINOSIS/ITIS DNA PNL VAG PROBE+SIG AMP: NEGATIVE

## 2025-06-19 ENCOUNTER — TELEPHONE (OUTPATIENT)
Age: 25
End: 2025-06-19

## 2025-06-19 DIAGNOSIS — M79.672 FOOT PAIN, BILATERAL: Primary | ICD-10-CM

## 2025-06-19 DIAGNOSIS — M79.671 FOOT PAIN, BILATERAL: Primary | ICD-10-CM

## 2025-06-19 NOTE — TELEPHONE ENCOUNTER
PT is calling to request a referral to Podiatry, she is having pain in her heels and foot. I adv I will contact her when placed.   Thank you

## 2025-06-19 NOTE — TELEPHONE ENCOUNTER
Caller: Mira Jameson    Doctor: Dr. Silverio    Reason for call: appt/checked chart for date last seen/2025/scheduled    Call back#: NA

## 2025-06-27 ENCOUNTER — OFFICE VISIT (OUTPATIENT)
Age: 25
End: 2025-06-27

## 2025-06-27 VITALS
SYSTOLIC BLOOD PRESSURE: 111 MMHG | WEIGHT: 199 LBS | HEIGHT: 64 IN | BODY MASS INDEX: 33.97 KG/M2 | TEMPERATURE: 97.1 F | DIASTOLIC BLOOD PRESSURE: 77 MMHG | HEART RATE: 93 BPM | OXYGEN SATURATION: 97 % | RESPIRATION RATE: 18 BRPM

## 2025-06-27 DIAGNOSIS — J06.9 VIRAL UPPER RESPIRATORY TRACT INFECTION: ICD-10-CM

## 2025-06-27 DIAGNOSIS — J01.00 ACUTE MAXILLARY SINUSITIS, RECURRENCE NOT SPECIFIED: Primary | ICD-10-CM

## 2025-06-27 LAB — S PYO AG THROAT QL: NEGATIVE

## 2025-06-27 PROCEDURE — 99213 OFFICE O/P EST LOW 20 MIN: CPT | Performed by: FAMILY MEDICINE

## 2025-06-27 PROCEDURE — 87880 STREP A ASSAY W/OPTIC: CPT | Performed by: FAMILY MEDICINE

## 2025-06-27 RX ORDER — AMOXICILLIN 500 MG/1
500 CAPSULE ORAL EVERY 8 HOURS SCHEDULED
Qty: 15 CAPSULE | Refills: 0 | Status: SHIPPED | OUTPATIENT
Start: 2025-06-27 | End: 2025-07-02

## 2025-06-27 RX ORDER — METHYLPREDNISOLONE 4 MG/1
TABLET ORAL
Qty: 21 EACH | Refills: 0 | Status: SHIPPED | OUTPATIENT
Start: 2025-06-27

## 2025-06-27 RX ORDER — BENZONATATE 100 MG/1
100 CAPSULE ORAL 3 TIMES DAILY PRN
Qty: 20 CAPSULE | Refills: 0 | Status: SHIPPED | OUTPATIENT
Start: 2025-06-27

## 2025-06-27 NOTE — PROGRESS NOTES
Name: Mira Jameson      : 2000      MRN: [id number]  Encounter Provider: Dennise Winslow MD  Encounter Date: 2025   Encounter department: [facility]  :  Assessment & Plan  Acute maxillary sinusitis, recurrence not specified  Acute,  Sinus pressure, fever, cough, congestion, ear pain since the last 2 to 3 days.  Has tried Tylenol for fever for a Tmax of 102.    PE-maxillary sinus tenderness  Take Tessalon Perles 100 mg 3 times daily as needed for cough  Take amoxicillin 500 mg twice daily for 5 days  POCT strep negative  Orders:    benzonatate (TESSALON PERLES) 100 mg capsule; Take 1 capsule (100 mg total) by mouth 3 (three) times a day as needed for cough    methylPREDNISolone 4 MG tablet therapy pack; Use as directed on package    amoxicillin (AMOXIL) 500 mg capsule; Take 1 capsule (500 mg total) by mouth every 8 (eight) hours for 5 days    Viral upper respiratory tract infection  Fever Tmax of 102, with extensive cough and minimal mucus production.  Has a history of asthma and has been using her inhaler more frequently since the last couple days.  Tylenol is providing minimal relief    PE-lungs clear to auscultation.  Drink warm honey to help sore throat.  Take Tylenol Cold and flu as needed for symptomatic relief  Take Medrol Dosepak for coughing    Orders:    POCT rapid ANTIGEN strepA    benzonatate (TESSALON PERLES) 100 mg capsule; Take 1 capsule (100 mg total) by mouth 3 (three) times a day as needed for cough    methylPREDNISolone 4 MG tablet therapy pack; Use as directed on package    amoxicillin (AMOXIL) 500 mg capsule; Take 1 capsule (500 mg total) by mouth every 8 (eight) hours for 5 days           History of Present Illness   HPI  Patient is a 25-year-old female who presents today for sinus pressure since last couple days.  Is not improving with Tylenol    Review of Systems   Constitutional:  Positive for chills and fever.   HENT:  Positive for ear pain,  "postnasal drip, rhinorrhea, sinus pressure and sore throat. Negative for congestion, ear discharge and voice change.    Respiratory:  Positive for cough. Negative for chest tightness and shortness of breath.    Cardiovascular:  Positive for chest pain.   Gastrointestinal:  Negative for nausea and vomiting.   Genitourinary:  Negative for decreased urine volume and difficulty urinating.   Neurological:  Positive for headaches. Negative for dizziness.       Objective   /77 (BP Location: Right arm, Patient Position: Sitting)   Pulse 93   Temp (!) 97.1 °F (36.2 °C)   Resp 18   Ht 5' 3.5\" (1.613 m)   Wt 90.3 kg (199 lb)   LMP 06/14/2025 (Exact Date)   SpO2 97%   BMI 34.70 kg/m²      Physical Exam  Vitals and nursing note reviewed.   Constitutional:       General: She is not in acute distress.     Appearance: She is well-developed.   HENT:      Head: Normocephalic and atraumatic.      Mouth/Throat:      Pharynx: Posterior oropharyngeal erythema present. No oropharyngeal exudate.     Eyes:      Conjunctiva/sclera: Conjunctivae normal.       Cardiovascular:      Rate and Rhythm: Normal rate and regular rhythm.      Heart sounds: No murmur heard.  Pulmonary:      Effort: Pulmonary effort is normal. No respiratory distress.      Breath sounds: Normal breath sounds.   Abdominal:      Palpations: Abdomen is soft.      Tenderness: There is no abdominal tenderness.     Musculoskeletal:         General: No swelling.      Cervical back: Neck supple.     Skin:     General: Skin is warm and dry.      Capillary Refill: Capillary refill takes less than 2 seconds.     Neurological:      Mental Status: She is alert.     Psychiatric:         Mood and Affect: Mood normal.         "

## 2025-07-01 ENCOUNTER — OFFICE VISIT (OUTPATIENT)
Age: 25
End: 2025-07-01
Payer: COMMERCIAL

## 2025-07-01 VITALS — WEIGHT: 199 LBS | HEIGHT: 64 IN | BODY MASS INDEX: 33.97 KG/M2

## 2025-07-01 DIAGNOSIS — M79.671 FOOT PAIN, BILATERAL: ICD-10-CM

## 2025-07-01 DIAGNOSIS — M79.672 FOOT PAIN, BILATERAL: ICD-10-CM

## 2025-07-01 DIAGNOSIS — M72.2 PLANTAR FASCIITIS, RIGHT: Primary | ICD-10-CM

## 2025-07-01 DIAGNOSIS — M72.2 PLANTAR FASCIITIS, LEFT: ICD-10-CM

## 2025-07-01 PROCEDURE — 99213 OFFICE O/P EST LOW 20 MIN: CPT | Performed by: STUDENT IN AN ORGANIZED HEALTH CARE EDUCATION/TRAINING PROGRAM

## 2025-07-01 RX ORDER — MELOXICAM 15 MG/1
15 TABLET ORAL DAILY
Qty: 30 TABLET | Refills: 0 | Status: SHIPPED | OUTPATIENT
Start: 2025-07-01

## 2025-07-01 NOTE — PATIENT INSTRUCTIONS
Purchase a supportive pair of sneakers such as Garzon, Hoka, Saucony, New Balance, On Cloud, Altra.  Some qualities to look for is that the shoe should bend only where the toes bend, the sole should not be too flimsy, it should have a wide toe box and a somewhat stiff heel support. Purchase a supportive over-the-counter pair of inserts such as Superfeet, powersteps, tread labs.    [employer]  [address] [facility] [address]  [phone]     [employer]  [address] [facility] [address], [place] [address]  [phone]     [employer]  [address] [place] [address] [phone]     [place] [employer]   [address] [facility]  [phone]     [employer]  [address], [place], [place] [address] [phone]     [employer]  [address] [place], [place] [id number] [phone]     [facility] [employer] [facility]   [address] [place] [address]  [phone]     [employer]  [address] [place], [place]  [phone]     [employer] [address], [address]  [phone]     [employer]  [address] [place] [address], [address], [place] [address] [phone]

## 2025-07-08 DIAGNOSIS — J45.20 MILD INTERMITTENT ASTHMA WITHOUT COMPLICATION: ICD-10-CM

## 2025-07-08 RX ORDER — ALBUTEROL SULFATE 1.25 MG/3ML
SOLUTION RESPIRATORY (INHALATION)
Qty: 75 ML | Refills: 0 | Status: SHIPPED | OUTPATIENT
Start: 2025-07-08

## 2025-07-11 DIAGNOSIS — R21 RASH AND NONSPECIFIC SKIN ERUPTION: ICD-10-CM

## 2025-07-11 RX ORDER — BENZOCAINE/MENTHOL 6 MG-10 MG
LOZENGE MUCOUS MEMBRANE
Qty: 28 G | Refills: 0 | Status: SHIPPED | OUTPATIENT
Start: 2025-07-11

## 2025-07-11 NOTE — TELEPHONE ENCOUNTER
Refill request  
  methylPREDNISolone 4 MG tablet  Commonly known as: MEDROL     ondansetron 4 MG tablet  Commonly known as: Zofran     oseltamivir 75 MG capsule  Commonly known as: TAMIFLU     Stiolto Respimat 2.5-2.5 MCG/ACT Aers  Generic drug: tiotropium-olodaterol               Where to Get Your Medications        These medications were sent to [facility] [address] [place], [place] [address]phillip Carrillo - P [phone] - F [phone]  [address] [place] [address] [place], [place] [id number]      Phone: [phone]   predniSONE 20 MG tablet         Time Spent on discharge is more than 45 minutes in the examination, evaluation, counseling and review of medications and discharge plan.    +++++++++++++++++++++++++++++++++++++++++++++++++  EFRAIN Vazquez - [facility]  o[address] [place], [place]  +++++++++++++++++++++++++++++++++++++++++++++++++  NOTE: This report was transcribed using voice recognition software. Every effort was made to ensure accuracy; however, inadvertent computerized transcription errors may be present.

## 2025-07-24 ENCOUNTER — TELEPHONE (OUTPATIENT)
Age: 25
End: 2025-07-24

## 2025-07-24 NOTE — TELEPHONE ENCOUNTER
I never prescribed this medication and am not sure why patient would be on it. I have not diagnosed or prescribed anything for dermatologic conditions. I would assume she is no longer part of the program, but I have no idea. Thank you

## 2025-07-24 NOTE — TELEPHONE ENCOUNTER
Hi Dr. Mcgill,    [facility] called.  They were inquiring if patient is still part of this program.  I cannot find this medication, or anything showing that she was prescribed this.      [facility].      Please advise.    Maybe you prescribed but it is awaiting a prior authorization?

## 2025-07-29 DIAGNOSIS — J45.20 MILD INTERMITTENT ASTHMA WITHOUT COMPLICATION: ICD-10-CM

## 2025-07-29 RX ORDER — ALBUTEROL SULFATE 1.25 MG/3ML
SOLUTION RESPIRATORY (INHALATION)
Qty: 75 ML | Refills: 0 | Status: SHIPPED | OUTPATIENT
Start: 2025-07-29

## 2025-08-11 ENCOUNTER — OFFICE VISIT (OUTPATIENT)
Age: 25
End: 2025-08-11
Attending: FAMILY MEDICINE
Payer: COMMERCIAL

## 2025-08-14 ENCOUNTER — OFFICE VISIT (OUTPATIENT)
Age: 25
End: 2025-08-14

## 2025-08-19 ENCOUNTER — TELEPHONE (OUTPATIENT)
Dept: BARIATRICS | Facility: CLINIC | Age: 25
End: 2025-08-19

## 2025-08-19 DIAGNOSIS — J45.20 MILD INTERMITTENT ASTHMA WITHOUT COMPLICATION: ICD-10-CM

## 2025-08-19 RX ORDER — ALBUTEROL SULFATE 1.25 MG/3ML
SOLUTION RESPIRATORY (INHALATION)
Qty: 75 ML | Refills: 0 | Status: SHIPPED | OUTPATIENT
Start: 2025-08-19

## 2025-08-20 ENCOUNTER — OFFICE VISIT (OUTPATIENT)
Dept: BARIATRICS | Facility: CLINIC | Age: 25
End: 2025-08-20
Attending: FAMILY MEDICINE
Payer: COMMERCIAL

## 2025-08-20 VITALS
HEIGHT: 63 IN | SYSTOLIC BLOOD PRESSURE: 110 MMHG | BODY MASS INDEX: 36.61 KG/M2 | WEIGHT: 206.6 LBS | DIASTOLIC BLOOD PRESSURE: 78 MMHG | HEART RATE: 89 BPM

## 2025-08-20 DIAGNOSIS — E66.01 CLASS 2 SEVERE OBESITY DUE TO EXCESS CALORIES WITH SERIOUS COMORBIDITY AND BODY MASS INDEX (BMI) OF 36.0 TO 36.9 IN ADULT (HCC): ICD-10-CM

## 2025-08-20 DIAGNOSIS — F33.0 MILD EPISODE OF RECURRENT MAJOR DEPRESSIVE DISORDER (HCC): ICD-10-CM

## 2025-08-20 DIAGNOSIS — E66.812 CLASS 2 SEVERE OBESITY DUE TO EXCESS CALORIES WITH SERIOUS COMORBIDITY AND BODY MASS INDEX (BMI) OF 36.0 TO 36.9 IN ADULT (HCC): ICD-10-CM

## 2025-08-20 DIAGNOSIS — E28.2 PCOS (POLYCYSTIC OVARIAN SYNDROME): Primary | ICD-10-CM

## 2025-08-20 PROCEDURE — 99204 OFFICE O/P NEW MOD 45 MIN: CPT | Performed by: NURSE PRACTITIONER

## (undated) DEVICE — 5 MM BABCOCKS WITH RATCHET HANDLES: Brand: ENDOPATH

## (undated) DEVICE — GAUZE SPONGES,16 PLY: Brand: CURITY

## (undated) DEVICE — TROCARS: Brand: KII® BALLOON BLUNT TIP SYSTEM

## (undated) DEVICE — 3M™ TEGADERM™ TRANSPARENT FILM DRESSING FRAME STYLE, 1624W, 2-3/8 IN X 2-3/4 IN (6 CM X 7 CM), 100/CT 4CT/CASE: Brand: 3M™ TEGADERM™

## (undated) DEVICE — 3M™ STERI-STRIP™ REINFORCED ADHESIVE SKIN CLOSURES, R1547, 1/2 IN X 4 IN (12 MM X 100 MM), 6 STRIPS/ENVELOPE: Brand: 3M™ STERI-STRIP™

## (undated) DEVICE — PACK GENERAL LF

## (undated) DEVICE — ANTI-FOG SOLUTION WITH FOAM PAD: Brand: DEVON

## (undated) DEVICE — TISSUE RETRIEVAL SYSTEM: Brand: INZII RETRIEVAL SYSTEM

## (undated) DEVICE — CHLORAPREP HI-LITE 26ML ORANGE

## (undated) DEVICE — SUT VICRYL PLUS 0 CT-3 27 IN VCP329H

## (undated) DEVICE — TROCAR: Brand: KII FIOS FIRST ENTRY

## (undated) DEVICE — 2, DISPOSABLE SUCTION/IRRIGATOR WITHOUT DISPOSABLE TIP: Brand: STRYKEFLOW

## (undated) DEVICE — STERILE DOUBLE BASIN SET PACK: Brand: CARDINAL HEALTH

## (undated) DEVICE — ENDOPATH ECHELON VASCULAR  RELOADS, WHITE, 35MM: Brand: ECHELON ENDOPATH

## (undated) DEVICE — SUT MONOCRYL 4-0 PC-5 18 IN Y823G

## (undated) DEVICE — TROCAR: Brand: KII® SLEEVE

## (undated) DEVICE — DRAPE UTILITY

## (undated) DEVICE — SHEARS: Brand: ENDO MINI-SHEARS

## (undated) DEVICE — LAPAROTOMY DRAPE WITH POUCHES: Brand: CONVERTORS

## (undated) DEVICE — TUBE SET SMOKE EVAC PNEUMOCLEAR HUMIDIFIED

## (undated) DEVICE — ECHELON FLEX  POWERED VASCULAR STAPLER WITH ADVANCED PLACEMENT TIP, 35MM: Brand: ECHELON FLEX

## (undated) DEVICE — HARMONIC 1100 SHEARS, 36CM SHAFT LENGTH: Brand: HARMONIC

## (undated) DEVICE — TRAY FOLEY 16FR URIMETER SURESTEP

## (undated) DEVICE — ASTOUND STANDARD SURGICAL GOWN, XL: Brand: CONVERTORS